# Patient Record
Sex: MALE | Race: WHITE | NOT HISPANIC OR LATINO | Employment: FULL TIME | URBAN - METROPOLITAN AREA
[De-identification: names, ages, dates, MRNs, and addresses within clinical notes are randomized per-mention and may not be internally consistent; named-entity substitution may affect disease eponyms.]

---

## 2017-06-05 DIAGNOSIS — E55.9 VITAMIN D DEFICIENCY: ICD-10-CM

## 2017-06-05 DIAGNOSIS — R53.83 OTHER FATIGUE: ICD-10-CM

## 2017-06-05 DIAGNOSIS — E29.1 TESTICULAR HYPOFUNCTION: ICD-10-CM

## 2017-06-05 DIAGNOSIS — F41.1 GENERALIZED ANXIETY DISORDER: ICD-10-CM

## 2017-06-05 DIAGNOSIS — Z13.220 ENCOUNTER FOR SCREENING FOR LIPOID DISORDERS: ICD-10-CM

## 2017-06-09 ENCOUNTER — TRANSCRIBE ORDERS (OUTPATIENT)
Dept: LAB | Facility: CLINIC | Age: 37
End: 2017-06-09

## 2017-06-09 ENCOUNTER — APPOINTMENT (OUTPATIENT)
Dept: LAB | Facility: CLINIC | Age: 37
End: 2017-06-09
Payer: COMMERCIAL

## 2017-06-09 DIAGNOSIS — E29.1 TESTICULAR HYPOFUNCTION: ICD-10-CM

## 2017-06-09 PROCEDURE — 36415 COLL VENOUS BLD VENIPUNCTURE: CPT

## 2017-06-09 PROCEDURE — 84403 ASSAY OF TOTAL TESTOSTERONE: CPT

## 2017-06-09 PROCEDURE — 84402 ASSAY OF FREE TESTOSTERONE: CPT

## 2017-06-10 LAB
TESTOST FREE SERPL-MCNC: 6.2 PG/ML (ref 8.7–25.1)
TESTOST SERPL-MCNC: 340 NG/DL (ref 348–1197)
TESTOSTERONE COMMENT: ABNORMAL

## 2017-06-12 ENCOUNTER — ALLSCRIPTS OFFICE VISIT (OUTPATIENT)
Dept: OTHER | Facility: OTHER | Age: 37
End: 2017-06-12

## 2017-06-12 ENCOUNTER — TRANSCRIBE ORDERS (OUTPATIENT)
Dept: LAB | Facility: CLINIC | Age: 37
End: 2017-06-12

## 2017-06-12 ENCOUNTER — APPOINTMENT (OUTPATIENT)
Dept: LAB | Facility: CLINIC | Age: 37
End: 2017-06-12
Payer: COMMERCIAL

## 2017-06-12 DIAGNOSIS — E29.1 TESTICULAR HYPOFUNCTION: ICD-10-CM

## 2017-06-12 DIAGNOSIS — E55.9 VITAMIN D DEFICIENCY: ICD-10-CM

## 2017-06-12 DIAGNOSIS — Z13.220 ENCOUNTER FOR SCREENING FOR LIPOID DISORDERS: ICD-10-CM

## 2017-06-12 DIAGNOSIS — R53.83 OTHER FATIGUE: ICD-10-CM

## 2017-06-12 DIAGNOSIS — F41.1 GENERALIZED ANXIETY DISORDER: ICD-10-CM

## 2017-06-12 LAB
25(OH)D3 SERPL-MCNC: 19.1 NG/ML (ref 30–100)
ALBUMIN SERPL BCP-MCNC: 3.9 G/DL (ref 3.5–5)
ALP SERPL-CCNC: 60 U/L (ref 46–116)
ALT SERPL W P-5'-P-CCNC: 35 U/L (ref 12–78)
ANION GAP SERPL CALCULATED.3IONS-SCNC: 6 MMOL/L (ref 4–13)
AST SERPL W P-5'-P-CCNC: 34 U/L (ref 5–45)
BILIRUB SERPL-MCNC: 0.72 MG/DL (ref 0.2–1)
BUN SERPL-MCNC: 17 MG/DL (ref 5–25)
CALCIUM SERPL-MCNC: 9.3 MG/DL (ref 8.3–10.1)
CHLORIDE SERPL-SCNC: 103 MMOL/L (ref 100–108)
CHOLEST SERPL-MCNC: 233 MG/DL (ref 50–200)
CO2 SERPL-SCNC: 30 MMOL/L (ref 21–32)
CREAT SERPL-MCNC: 0.92 MG/DL (ref 0.6–1.3)
ERYTHROCYTE [DISTWIDTH] IN BLOOD BY AUTOMATED COUNT: 14 % (ref 11.6–15.1)
GFR SERPL CREATININE-BSD FRML MDRD: >60 ML/MIN/1.73SQ M
GLUCOSE SERPL-MCNC: 98 MG/DL (ref 65–140)
HCT VFR BLD AUTO: 46.3 % (ref 36.5–49.3)
HDLC SERPL-MCNC: 63 MG/DL (ref 40–60)
HGB BLD-MCNC: 15.5 G/DL (ref 12–17)
IRON SERPL-MCNC: 92 UG/DL (ref 65–175)
LDLC SERPL CALC-MCNC: 147 MG/DL (ref 0–100)
MCH RBC QN AUTO: 29.8 PG (ref 26.8–34.3)
MCHC RBC AUTO-ENTMCNC: 33.5 G/DL (ref 31.4–37.4)
MCV RBC AUTO: 89 FL (ref 82–98)
PLATELET # BLD AUTO: 213 THOUSANDS/UL (ref 149–390)
PMV BLD AUTO: 11.5 FL (ref 8.9–12.7)
POTASSIUM SERPL-SCNC: 4.6 MMOL/L (ref 3.5–5.3)
PROT SERPL-MCNC: 7.2 G/DL (ref 6.4–8.2)
RBC # BLD AUTO: 5.2 MILLION/UL (ref 3.88–5.62)
SODIUM SERPL-SCNC: 139 MMOL/L (ref 136–145)
TRIGL SERPL-MCNC: 115 MG/DL
TSH SERPL DL<=0.05 MIU/L-ACNC: 3.1 UIU/ML (ref 0.36–3.74)
VIT B12 SERPL-MCNC: 575 PG/ML (ref 100–900)
WBC # BLD AUTO: 9.02 THOUSAND/UL (ref 4.31–10.16)

## 2017-06-12 PROCEDURE — 36415 COLL VENOUS BLD VENIPUNCTURE: CPT

## 2017-06-12 PROCEDURE — 83540 ASSAY OF IRON: CPT

## 2017-06-12 PROCEDURE — 85027 COMPLETE CBC AUTOMATED: CPT

## 2017-06-12 PROCEDURE — 80053 COMPREHEN METABOLIC PANEL: CPT

## 2017-06-12 PROCEDURE — 84402 ASSAY OF FREE TESTOSTERONE: CPT

## 2017-06-12 PROCEDURE — 84403 ASSAY OF TOTAL TESTOSTERONE: CPT

## 2017-06-12 PROCEDURE — 82607 VITAMIN B-12: CPT

## 2017-06-12 PROCEDURE — 82306 VITAMIN D 25 HYDROXY: CPT

## 2017-06-12 PROCEDURE — 80061 LIPID PANEL: CPT

## 2017-06-12 PROCEDURE — 84443 ASSAY THYROID STIM HORMONE: CPT

## 2017-06-13 LAB
TESTOST FREE SERPL-MCNC: 6.3 PG/ML (ref 8.7–25.1)
TESTOST SERPL-MCNC: 426 NG/DL (ref 348–1197)
TESTOSTERONE COMMENT: ABNORMAL

## 2017-06-14 ENCOUNTER — GENERIC CONVERSION - ENCOUNTER (OUTPATIENT)
Dept: OTHER | Facility: OTHER | Age: 37
End: 2017-06-14

## 2017-06-19 ENCOUNTER — TRANSCRIBE ORDERS (OUTPATIENT)
Dept: ADMINISTRATIVE | Facility: HOSPITAL | Age: 37
End: 2017-06-19

## 2017-06-19 DIAGNOSIS — R06.83 SNORING: Primary | ICD-10-CM

## 2017-06-19 DIAGNOSIS — R40.0 DAYTIME SLEEPINESS: ICD-10-CM

## 2017-07-05 ENCOUNTER — APPOINTMENT (OUTPATIENT)
Dept: LAB | Facility: MEDICAL CENTER | Age: 37
End: 2017-07-05
Payer: COMMERCIAL

## 2017-07-05 DIAGNOSIS — E29.1 TESTICULAR HYPOFUNCTION: ICD-10-CM

## 2017-07-05 PROCEDURE — 84403 ASSAY OF TOTAL TESTOSTERONE: CPT

## 2017-07-05 PROCEDURE — 36415 COLL VENOUS BLD VENIPUNCTURE: CPT

## 2017-07-05 PROCEDURE — 84402 ASSAY OF FREE TESTOSTERONE: CPT

## 2017-07-06 LAB
TESTOST FREE SERPL-MCNC: 32.3 PG/ML (ref 8.7–25.1)
TESTOST SERPL-MCNC: 1474 NG/DL (ref 348–1197)
TESTOSTERONE COMMENT: ABNORMAL

## 2017-07-07 ENCOUNTER — GENERIC CONVERSION - ENCOUNTER (OUTPATIENT)
Dept: OTHER | Facility: OTHER | Age: 37
End: 2017-07-07

## 2017-08-09 ENCOUNTER — TRANSCRIBE ORDERS (OUTPATIENT)
Dept: ADMINISTRATIVE | Facility: HOSPITAL | Age: 37
End: 2017-08-09

## 2017-08-09 DIAGNOSIS — G47.33 OBSTRUCTIVE SLEEP APNEA (ADULT) (PEDIATRIC): Primary | ICD-10-CM

## 2018-01-13 NOTE — RESULT NOTES
Verified Results  (1) CBC/ PLT (NO DIFF) [id number] 09:26AM Delisa Nguyen Order Number: [id number]     Test Name Result Flag Reference   HEMATOCRIT 46 3 %  36 5-49 3   HEMOGLOBIN 15 5 g/dL  12 0-17 0   MCHC 33 5 g/dL  31 4-37 4   MCH 29 8 pg  26 8-34 3   MCV 89 fL  82-98   PLATELET COUNT 075 Thousands/uL  149-390   RBC COUNT 5 20 Million/uL  3 88-5 62   RDW 14 0 %  11 6-15 1   WBC COUNT 9 02 Thousand/uL  4 31-10 16   MPV 11 5 fL  8 9-12 7     (1) COMPREHENSIVE METABOLIC PANEL [id number] 90:12ZY Delisa Nguyen Order Number: [id number]     Test Name Result Flag Reference   GLUCOSE,RANDM 98 mg/dL     If the patient is fasting, the ADA then defines impaired fasting glucose as > 100 mg/dL and diabetes as > or equal to 123 mg/dL  SODIUM 139 mmol/L  136-145   POTASSIUM 4 6 mmol/L  3 5-5 3   CHLORIDE 103 mmol/L  100-108   CARBON DIOXIDE 30 mmol/L  21-32   ANION GAP (CALC) 6 mmol/L  4-13   BLOOD UREA NITROGEN 17 mg/dL  5-25   CREATININE 0 92 mg/dL  0 60-1 30   Standardized to IDMS reference method   CALCIUM 9 3 mg/dL  8 3-10 1   BILI, TOTAL 0 72 mg/dL  0 20-1 00   ALK PHOSPHATAS 60 U/L     ALT (SGPT) 35 U/L  12-78   AST(SGOT) 34 U/L  5-45   ALBUMIN 3 9 g/dL  3 5-5 0   TOTAL PROTEIN 7 2 g/dL  6 4-8 2   eGFR Non-African American      >60 0 ml/min/1 73sq m   [facility] Disease Education Program recommendations are as follows:  GFR calculation is accurate only with a steady state creatinine  Chronic Kidney disease less than 60 ml/min/1 73 sq  meters  Kidney failure less than 15 ml/min/1 73 sq  meters  (1) LIPID PANEL, FASTING 12Jun2017 09:26AM Delisa Nguyen Order Number: [id number]     Test Name Result Flag Reference   CHOLESTEROL 233 mg/dL H    HDL,DIRECT 63 mg/dL H 40-60   Specimen collection should occur prior to Metamizole administration due to the potential for falsely depressed results     LDL CHOLESTEROL CALCULATED 147 mg/dL H 0-100 Triglyceride:         Normal              <150 mg/dl       Borderline High    150-199 mg/dl       High               200-499 mg/dl       Very High          >499 mg/dl  Cholesterol:         Desirable        <200 mg/dl      Borderline High  200-239 mg/dl      High             >239 mg/dl  HDL Cholesterol:        High    >59 mg/dL      Low     <41 mg/dL  LDL CALCULATED:    This screening LDL is a calculated result  It does not have the accuracy of the Direct Measured LDL in the monitoring of patients with hyperlipidemia and/or statin therapy  Direct Measure LDL (KIR477) must be ordered separately in these patients  TRIGLYCERIDES 115 mg/dL  <=150   Specimen collection should occur prior to N-Acetylcysteine or Metamizole administration due to the potential for falsely depressed results  (1) TSH [id number] 09:26AM Abiodun Sorenson Order Number: [id number]     Test Name Result Flag Reference   TSH 3 100 uIU/mL  0 358-3 740   Patients undergoing fluorescein dye angiography may retain small amounts of fluorescein in the body for 48-72 hours post procedure  Samples containing fluorescein can produce falsely depressed TSH values  If the patient had this procedure,a specimen should be resubmitted post fluorescein clearance  (1) VITAMIN D 25-HYDROXY 12Jun2017 09:26AM Abiodun Sorenson Order Number: [id number]     Test Name Result Flag Reference   VIT D 25-HYDROX 19 1 ng/mL L 30 0-100 0   This assay is a certified procedure of the CDC Vitamin D Standardization Certification Program (VDSCP)     Deficiency <20ng/ml   Insufficiency 20-30ng/ml   Sufficient  ng/ml     *Patients undergoing fluorescein dye angiography may retain small amounts of fluorescein in the body for 48-72 hours post procedure  Samples containing fluorescein can produce falsely elevated Vitamin D values  If the patient had this procedure, a specimen should be resubmitted post fluorescein clearance       (1) TESTOSTERONE, FREE (DIRECT) AND TOTAL 12Jun2017 09:26AM Sophia Cali Moura Order Number: [id number]     Test Name Result Flag Reference   FREE TESTOSTERONE, DIRECT 6 3 pg/mL L 8 7 - 25 1   COMMENT Comment     Adult male reference interval is based on a population of lean males  up to 36years old  TESTOSTERONE (TOTAL) 426 ng/dL  348 - 1197   **Effective July 17, 2017 the reference interval for**    Testosterone, Serum Males >25years old will be    changing to: Adult Males:      264 - 916    Performed at:  [address] [place] [address]  [id number]  : Braydon Saleem MD, Phone:  [phone]     (1) VITAMIN B12 [id number] 09:26AM Jory Richards Order Number: [id number]     Test Name Result Flag Reference   VITAMIN B12 575 pg/mL  100-900     (1) IRON [id number] 09:26AM Jory Richards Order Number: [id number]     Test Name Result Flag Reference   IRON 92 ug/dL     Patients treated with metal-binding drugs (ie  Deferoxamine) may have depressed iron values

## 2018-01-14 VITALS
HEIGHT: 60 IN | DIASTOLIC BLOOD PRESSURE: 70 MMHG | WEIGHT: 212.5 LBS | HEART RATE: 63 BPM | TEMPERATURE: 97.1 F | BODY MASS INDEX: 41.72 KG/M2 | RESPIRATION RATE: 18 BRPM | OXYGEN SATURATION: 98 % | SYSTOLIC BLOOD PRESSURE: 110 MMHG

## 2018-01-17 NOTE — RESULT NOTES
Verified Results  (1) TESTOSTERONE, FREE (DIRECT) AND TOTAL [id number] 03:41PM Rex Varela Order Number: [id number]     Test Name Result Flag Reference   FREE TESTOSTERONE, DIRECT 32 3 pg/mL H 8 7 - 25 1   COMMENT Comment     Adult male reference interval is based on a population of lean males  up to 36years old     TESTOSTERONE (TOTAL) 1474 ng/dL H 348 - 1197   **Please note reference interval change**    Performed at:  [facility] [employer] [address]  [id number]  : Amilcar Sheldon MD, Phone:  [phone]

## 2018-01-17 NOTE — RESULT NOTES
Verified Results  (1) CBC/PLT/DIFF [id number] 10:06AM Geraldine Bey    Order Number: [id number]  TW Order Number: [id number]     Test Name Result Flag Reference   WBC COUNT 7 12 Thousand/uL  4 31-10 16   RBC COUNT 5 22 Million/uL  3 88-5 62   HEMOGLOBIN 15 5 g/dL  12 0-17 0   HEMATOCRIT 46 5 %  36 5-49 3   MCV 89 fL  82-98   MCH 29 7 pg  26 8-34 3   MCHC 33 3 g/dL  31 4-37 4   RDW 13 2 %  11 6-15 1   MPV 11 5 fL  8 9-12 7   PLATELET COUNT 471 Thousands/uL  149-390   NEUTROPHILS RELATIVE PERCENT 63 %  43-75   LYMPHOCYTES RELATIVE PERCENT 28 %  14-44   MONOCYTES RELATIVE PERCENT 8 %  4-12   EOSINOPHILS RELATIVE PERCENT 1 %  0-6   BASOPHILS RELATIVE PERCENT 0 %  0-1   NEUTROPHILS ABSOLUTE COUNT 4 45 Thousands/?L  1 85-7 62   LYMPHOCYTES ABSOLUTE COUNT 2 02 Thousands/?L  0 60-4 47   MONOCYTES ABSOLUTE COUNT 0 57 Thousand/?L  0 17-1 22   EOSINOPHILS ABSOLUTE COUNT 0 07 Thousand/?L  0 00-0 61   BASOPHILS ABSOLUTE COUNT 0 01 Thousands/?L  0 00-0 10   - Patient Instructions: This bloodwork is non-fasting  Please drink two glasses of water morning of bloodwork  - Patient Instructions: This bloodwork is non-fasting  Please drink two glasses of water morning of bloodwork  - Patient Instructions: This bloodwork is non-fasting  Please drink two glasses of water morning of bloodwork  - Patient Instructions: This bloodwork is non-fasting  Please drink two glasses of water morning of bloodwork  (1) COMPREHENSIVE METABOLIC PANEL [id number] 05:85KM Delisa Fontana Order Number: [id number]   Order Number: [id number]     Test Name Result Flag Reference   GLUCOSE,RANDM 98 mg/dL     If the patient is fasting, the ADA then defines impaired fasting glucose as > 100 mg/dL and diabetes as > or equal to 123 mg/dL     SODIUM 137 mmol/L  136-145   POTASSIUM 4 5 mmol/L  3 5-5 3   CHLORIDE 101 mmol/L  100-108   CARBON DIOXIDE 26 mmol/L  21-32   ANION GAP (CALC) 10 mmol/L  4-13 BLOOD UREA NITROGEN 13 mg/dL  5-25   CREATININE 1 10 mg/dL  0 60-1 30   Standardized to IDMS reference method   CALCIUM 8 9 mg/dL  8 3-10 1   BILI, TOTAL 1 60 mg/dL H 0 20-1 00   ALK PHOSPHATAS 52 U/L     ALT (SGPT) 33 U/L  12-78   AST(SGOT) 35 U/L  5-45   ALBUMIN 4 1 g/dL  3 5-5 0   TOTAL PROTEIN 7 2 g/dL  6 4-8 2   eGFR Non-African American      >60 0 ml/min/1 73sq m   - Patient Instructions: This is a fasting blood test  Water,black tea or black  coffee only after 9:00pm the night before test Drink 2 glasses of water the morning of test - Patient Instructions: This is a fasting blood test  Water,black tea or black    coffee only after 9:00pm the night before test Drink 2 glasses of water the morning of test - Patient Instructions: This bloodwork is non-fasting  Please drink two glasses of water morning of bloodwork  - Patient Instructions: This bloodwork is   non-fasting  Please drink two glasses of water morning of bloodwork  National Kidney Disease Education Program recommendations are as follows:  GFR calculation is accurate only with a steady state creatinine  Chronic Kidney disease less than 60 ml/min/1 73 sq  meters  Kidney failure less than 15 ml/min/1 73 sq  meters  (1) LIPID PANEL, FASTING 01Aug2016 10:06AM Raad Art Order Number: [id number]   Order Number: [id number]     Test Name Result Flag Reference   CHOLESTEROL 235 mg/dL H    HDL,DIRECT 74 mg/dL H 40-60   Specimen collection should occur prior to Metamizole administration due to the potential for falsely depressed results  LDL CHOLESTEROL CALCULATED 150 mg/dL H 0-100   - Patient Instructions: This is a fasting blood test  Water,black tea or black  coffee only after 9:00pm the night before test   Drink 2 glasses of water the morning of test    - Patient Instructions:  This is a fasting blood test  Water,black tea or black  coffee only after 9:00pm the night before test   Drink 2 glasses of water the morning of test     - Patient Instructions: This is a fasting blood test  Water,black tea or black  coffee only after 9:00pm the night before test Drink 2 glasses of water the morning of test - Patient Instructions: This is a fasting blood test  Water,black tea or black    coffee only after 9:00pm the night before test Drink 2 glasses of water the morning of test - Patient Instructions: This bloodwork is non-fasting  Please drink two glasses of water morning of bloodwork  - Patient Instructions: This bloodwork is   non-fasting  Please drink two glasses of water morning of bloodwork  Triglyceride:         Normal              <150 mg/dl       Borderline High    150-199 mg/dl       High               200-499 mg/dl       Very High          >499 mg/dl  Cholesterol:         Desirable        <200 mg/dl      Borderline High  200-239 mg/dl      High             >239 mg/dl  HDL Cholesterol:        High    >59 mg/dL      Low     <41 mg/dL  LDL CALCULATED:    This screening LDL is a calculated result  It does not have the accuracy of the Direct Measured LDL in the monitoring of patients with hyperlipidemia and/or statin therapy  Direct Measure LDL (PFR791) must be ordered separately in these patients  TRIGLYCERIDES 54 mg/dL  <=150   Specimen collection should occur prior to N-Acetylcysteine or Metamizole administration due to the potential for falsely depressed results  (1) TSH [id number] 10:06AM Alysha Medina Order Number: [id number]   Order Number: [id number]     Test Name Result Flag Reference   TSH 2 415 uIU/mL  0 358-3 740   - Patient Instructions: This bloodwork is non-fasting  Please drink two glasses of water morning of bloodwork  - Patient Instructions: This bloodwork is non-fasting  Please drink two glasses of water morning of bloodwork  - Patient Instructions:  This is a fasting blood test  Water,black tea or black  coffee only after 9:00pm the night before test Drink 2 glasses of water the morning of test - Patient Instructions: This is a fasting blood test  Water,black tea or black    coffee only after 9:00pm the night before test Drink 2 glasses of water the morning of test - Patient Instructions: This bloodwork is non-fasting  Please drink two glasses of water morning of bloodwork  - Patient Instructions: This bloodwork is   non-fasting  Please drink two glasses of water morning of bloodwork  Patients undergoing fluorescein dye angiography may retain small amounts of fluorescein in the body for 48-72 hours post procedure  Samples containing fluorescein can produce falsely depressed TSH values  If the patient had this procedure,a specimen should be resubmitted post fluorescein clearance  (1) VITAMIN D 25-HYDROXY [id number] 10:06AM Elaine Hayward Order Number: [id number]   Order Number: [id number]     Test Name Result Flag Reference   VIT D 25-HYDROX 25 7 ng/mL L 30 0-100 0   This assay is a certified procedure of the CDC Vitamin D Standardization Certification Program (VDSCP)     Deficiency <20ng/ml   Insufficiency 20-30ng/ml   Sufficient  ng/ml     *Patients undergoing fluorescein dye angiography may retain small amounts of fluorescein in the body for 48-72 hours post procedure  Samples containing fluorescein can produce falsely elevated Vitamin D values  If the patient had this procedure, a specimen should be resubmitted post fluorescein clearance

## 2018-01-18 NOTE — RESULT NOTES
Verified Results  (1) TESTOSTERONE, FREE (DIRECT) AND TOTAL [id number] 10:06AM Jackye Reason Order Number: [id number]     Test Name Result Flag Reference   FREE TESTOSTERONE, DIRECT 3 7 pg/mL L 8 7 - 25 1   COMMENT Comment     Adult male reference interval is based on a population of lean males  up to 36years old     TESTOSTERONE (TOTAL) 299 ng/dL L 348 - 1197   Performed at:  [address] [place] [address]  [id number]  : Nelida Mccullough MD, Phone:  [phone]

## 2018-02-12 ENCOUNTER — TELEPHONE (OUTPATIENT)
Dept: FAMILY MEDICINE CLINIC | Facility: CLINIC | Age: 38
End: 2018-02-12

## 2018-02-12 NOTE — TELEPHONE ENCOUNTER
Pt called and stated that he was recently dx with gum disease  His ? Is  are any of the medications that he is taking will cause or increase the chance of gum disease    He mentioned the testosterone    Please advise  [phone]

## 2018-02-12 NOTE — TELEPHONE ENCOUNTER
Nope, all his meds are ok and do not contribute to this   Usually the meds responsible for this are seizure meds, immunosuppresants and certain BP meds

## 2018-02-20 ENCOUNTER — TELEPHONE (OUTPATIENT)
Dept: FAMILY MEDICINE CLINIC | Facility: CLINIC | Age: 38
End: 2018-02-20

## 2018-02-20 DIAGNOSIS — E29.1 HYPOGONADISM IN MALE: Primary | ICD-10-CM

## 2018-02-20 NOTE — TELEPHONE ENCOUNTER
Oj  No longer will be doing the testosterone shot, he stated that he had a bad experience      He was do to take the shot yesterday  He would like to know if he can go back on the lotion    Please advise    Lorena aragon  [phone]

## 2018-02-21 ENCOUNTER — TRANSCRIBE ORDERS (OUTPATIENT)
Dept: LAB | Facility: CLINIC | Age: 38
End: 2018-02-21

## 2018-02-21 ENCOUNTER — APPOINTMENT (OUTPATIENT)
Dept: LAB | Facility: CLINIC | Age: 38
End: 2018-02-21
Payer: COMMERCIAL

## 2018-02-21 DIAGNOSIS — E29.1 HYPOGONADISM IN MALE: ICD-10-CM

## 2018-02-21 PROCEDURE — 84402 ASSAY OF FREE TESTOSTERONE: CPT

## 2018-02-21 PROCEDURE — 36415 COLL VENOUS BLD VENIPUNCTURE: CPT

## 2018-02-21 PROCEDURE — 84403 ASSAY OF TOTAL TESTOSTERONE: CPT

## 2018-02-22 LAB
TESTOST FREE SERPL-MCNC: 6 PG/ML (ref 8.7–25.1)
TESTOST SERPL-MCNC: 370 NG/DL (ref 264–916)

## 2018-02-23 DIAGNOSIS — E29.1 TESTICULAR HYPOGONADISM: Primary | ICD-10-CM

## 2018-02-23 RX ORDER — TESTOSTERONE 40.5 MG/2.5G
40.5 GEL TOPICAL DAILY
Qty: 2.5 G | Refills: 0 | Status: SHIPPED | OUTPATIENT
Start: 2018-02-23 | End: 2018-05-02 | Stop reason: SDUPTHER

## 2018-02-27 RX ORDER — ERGOCALCIFEROL 1.25 MG/1
1 CAPSULE ORAL WEEKLY
COMMUNITY
Start: 2017-06-15 | End: 2019-07-16 | Stop reason: SDUPTHER

## 2018-02-27 RX ORDER — ERGOCALCIFEROL 1.25 MG/1
50000 CAPSULE ORAL WEEKLY
Qty: 4 CAPSULE | Refills: 5 | OUTPATIENT
Start: 2018-02-27

## 2018-05-02 DIAGNOSIS — E29.1 TESTICULAR HYPOGONADISM: ICD-10-CM

## 2018-05-02 RX ORDER — TESTOSTERONE 40.5 MG/2.5G
40.5 GEL TOPICAL DAILY
Qty: 2.5 G | Refills: 1 | Status: SHIPPED | OUTPATIENT
Start: 2018-05-02 | End: 2018-07-24 | Stop reason: SDUPTHER

## 2018-05-08 DIAGNOSIS — F41.1 GAD (GENERALIZED ANXIETY DISORDER): Primary | ICD-10-CM

## 2018-05-08 RX ORDER — CITALOPRAM 40 MG/1
40 TABLET ORAL DAILY
Qty: 30 TABLET | Refills: 0 | Status: SHIPPED | OUTPATIENT
Start: 2018-05-08 | End: 2018-07-24 | Stop reason: SDUPTHER

## 2018-05-08 RX ORDER — CITALOPRAM 40 MG/1
1 TABLET ORAL DAILY
COMMUNITY
Start: 2016-07-25 | End: 2018-05-08 | Stop reason: SDUPTHER

## 2018-05-08 RX ORDER — BUSPIRONE HYDROCHLORIDE 5 MG/1
5 TABLET ORAL 3 TIMES DAILY
Qty: 270 TABLET | Refills: 0 | Status: SHIPPED | OUTPATIENT
Start: 2018-05-08 | End: 2018-07-24 | Stop reason: SDUPTHER

## 2018-05-08 RX ORDER — BUSPIRONE HYDROCHLORIDE 5 MG/1
1 TABLET ORAL 3 TIMES DAILY
COMMUNITY
Start: 2016-07-25 | End: 2018-05-08 | Stop reason: SDUPTHER

## 2018-07-23 ENCOUNTER — TELEPHONE (OUTPATIENT)
Dept: FAMILY MEDICINE CLINIC | Facility: CLINIC | Age: 38
End: 2018-07-23

## 2018-07-23 NOTE — TELEPHONE ENCOUNTER
Change pharmacy to [facility]        Asking for Androgel, Buspirone, citalopram  Not seen since 6/17  Called and L/M for pt to schedule appt

## 2018-07-24 DIAGNOSIS — E29.1 TESTICULAR HYPOGONADISM: ICD-10-CM

## 2018-07-24 DIAGNOSIS — F41.1 GAD (GENERALIZED ANXIETY DISORDER): ICD-10-CM

## 2018-07-24 RX ORDER — BUSPIRONE HYDROCHLORIDE 5 MG/1
5 TABLET ORAL 3 TIMES DAILY
Qty: 90 TABLET | Refills: 0 | Status: SHIPPED | OUTPATIENT
Start: 2018-07-24 | End: 2018-10-01 | Stop reason: SDUPTHER

## 2018-07-24 RX ORDER — TESTOSTERONE 40.5 MG/2.5G
40.5 GEL TOPICAL DAILY
Qty: 2.5 G | Refills: 0 | Status: SHIPPED | OUTPATIENT
Start: 2018-07-24 | End: 2018-09-06 | Stop reason: SDUPTHER

## 2018-07-24 RX ORDER — CITALOPRAM 40 MG/1
40 TABLET ORAL DAILY
Qty: 30 TABLET | Refills: 0 | Status: SHIPPED | OUTPATIENT
Start: 2018-07-24 | End: 2018-10-01 | Stop reason: SDUPTHER

## 2018-08-08 ENCOUNTER — OFFICE VISIT (OUTPATIENT)
Dept: FAMILY MEDICINE CLINIC | Facility: CLINIC | Age: 38
End: 2018-08-08
Payer: COMMERCIAL

## 2018-08-08 VITALS
DIASTOLIC BLOOD PRESSURE: 64 MMHG | BODY MASS INDEX: 30.34 KG/M2 | HEIGHT: 68 IN | SYSTOLIC BLOOD PRESSURE: 108 MMHG | WEIGHT: 200.2 LBS

## 2018-08-08 DIAGNOSIS — E29.1 TESTICULAR HYPOGONADISM: ICD-10-CM

## 2018-08-08 DIAGNOSIS — F41.1 GENERALIZED ANXIETY DISORDER: ICD-10-CM

## 2018-08-08 DIAGNOSIS — E55.9 VITAMIN D DEFICIENCY: Primary | ICD-10-CM

## 2018-08-08 DIAGNOSIS — R94.5 ABNORMAL RESULTS OF LIVER FUNCTION STUDIES: ICD-10-CM

## 2018-08-08 DIAGNOSIS — E66.09 CLASS 1 OBESITY DUE TO EXCESS CALORIES WITHOUT SERIOUS COMORBIDITY WITH BODY MASS INDEX (BMI) OF 30.0 TO 30.9 IN ADULT: ICD-10-CM

## 2018-08-08 DIAGNOSIS — F98.5 ADULT STUTTERING: ICD-10-CM

## 2018-08-08 PROBLEM — R53.83 FATIGUE: Status: ACTIVE | Noted: 2017-06-12

## 2018-08-08 PROBLEM — G47.19 EXCESSIVE DAYTIME SLEEPINESS: Status: ACTIVE | Noted: 2017-06-12

## 2018-08-08 PROCEDURE — 99214 OFFICE O/P EST MOD 30 MIN: CPT | Performed by: NURSE PRACTITIONER

## 2018-08-08 PROCEDURE — 3008F BODY MASS INDEX DOCD: CPT | Performed by: NURSE PRACTITIONER

## 2018-08-08 NOTE — PROGRESS NOTES
Assessment/Plan:    Establish Care/ New Patient  Overall doing well  Will update lab work to look for causes of fatigue  Denies any anxiety or depression  If hormone therapy treatment needs to be changed will refer patient to endocrinology  Patient verbalizes understand and agrees with treatment plan  Will update patient when lab results available  Follow up in 6 months  Call us if you experience any worsening symptoms or no improvement  Adult stuttering  Stable on Celexa and buspar  Abnormal results of liver function studies  Will update CMP    Testicular hypogonadism  Will update testosterone level    Generalized anxiety disorder  Patient denies this diagnosis is accurate  Will remove  Vitamin D deficiency  Will update vitamin D       Diagnoses and all orders for this visit:    Vitamin D deficiency  -     Vitamin D 25 hydroxy; Future    Abnormal results of liver function studies  -     Comprehensive metabolic panel; Future    Testicular hypogonadism  -     Testosterone, free, total; Future    Generalized anxiety disorder  -     CBC and differential; Future  -     TSH, 3rd generation; Future    Class 1 obesity due to excess calories without serious comorbidity with body mass index (BMI) of 30 0 to 30 9 in adult  -     CBC and differential; Future  -     Comprehensive metabolic panel; Future  -     TSH, 3rd generation; Future    Adult stuttering              Subjective:        Patient ID: Luke Stoddard is a 42 y o  male  Chief Complaint   Patient presents with    Establish Care    Testosterone Level     level is 370, taking Andorgel for over 1 year    Vitamin D Deficiency     would like to start taking vitamin D 50,0000 units again       Here to establish as new patient  This is now closer to his home  Uses androgel for low testosterone, doesn't feel he has much energy to do much  Always feels tired and feels it's Testerone related  Denies anxiety or depression     On celexa and buspar for stuttering  Sleeps 8 hours a night  Exercises and eats healthy  Works as a  for [employer]  Has 2 daughters, one is 11 one is 15  Up to date with dentist and almost due for eye doctor  Wears glasses  Overall doing well  Does not smoke  Social alcohol use  Currently going through divorce and will be moving to [address] [facility] next week  The following portions of the patient's history were reviewed and updated as appropriate: allergies, current medications, past family history, past social history and problem list     Review of Systems   Constitutional: Positive for fatigue  Negative for chills and fever  HENT: Negative for congestion  Eyes: Negative for pain and visual disturbance  Respiratory: Negative for cough and shortness of breath  Cardiovascular: Negative for chest pain, palpitations and leg swelling  Gastrointestinal: Negative for abdominal pain, diarrhea, nausea and vomiting  Genitourinary: Negative for difficulty urinating and dysuria  Musculoskeletal: Negative for arthralgias and myalgias  Skin: Negative for color change and rash  Neurological: Negative for dizziness, syncope, numbness and headaches  Hematological: Negative for adenopathy  Psychiatric/Behavioral: Negative for agitation and behavioral problems  The patient is not nervous/anxious  Objective:  /64 (BP Location: Left arm, Patient Position: Sitting, Cuff Size: Standard)   Ht 5' 7 5" (1 715 m)   Wt 90 8 kg (200 lb 3 2 oz)   BMI 30 89 kg/m²      Physical Exam   Constitutional: He is oriented to person, place, and time  He appears well-developed  No distress  HENT:   Head: Normocephalic and atraumatic  Right Ear: External ear normal    Left Ear: External ear normal    Nose: Nose normal    Eyes: Conjunctivae and lids are normal  Right eye exhibits no discharge  Left eye exhibits no discharge  Neck: Normal range of motion  Neck supple  No tracheal deviation present  Cardiovascular: Normal rate and regular rhythm  No murmur heard  Pulmonary/Chest: Effort normal and breath sounds normal  No respiratory distress  He has no wheezes  Abdominal: Soft  Bowel sounds are normal  He exhibits no distension  There is no tenderness  There is no guarding  Musculoskeletal: Normal range of motion  He exhibits no edema, tenderness or deformity  Lymphadenopathy:     He has no cervical adenopathy  Neurological: He is alert and oriented to person, place, and time  Coordination normal    Skin: Skin is warm and dry  No rash noted  He is not diaphoretic  No erythema  Psychiatric: He has a normal mood and affect  His speech is normal and behavior is normal  Judgment and thought content normal  Cognition and memory are normal    Nursing note and vitals reviewed

## 2018-08-15 ENCOUNTER — APPOINTMENT (OUTPATIENT)
Dept: LAB | Facility: CLINIC | Age: 38
End: 2018-08-15
Payer: COMMERCIAL

## 2018-08-15 DIAGNOSIS — E29.1 TESTICULAR HYPOGONADISM: ICD-10-CM

## 2018-08-15 DIAGNOSIS — E55.9 VITAMIN D DEFICIENCY: ICD-10-CM

## 2018-08-15 DIAGNOSIS — R94.5 ABNORMAL RESULTS OF LIVER FUNCTION STUDIES: ICD-10-CM

## 2018-08-15 DIAGNOSIS — F41.1 GENERALIZED ANXIETY DISORDER: ICD-10-CM

## 2018-08-15 DIAGNOSIS — E66.09 CLASS 1 OBESITY DUE TO EXCESS CALORIES WITHOUT SERIOUS COMORBIDITY WITH BODY MASS INDEX (BMI) OF 30.0 TO 30.9 IN ADULT: ICD-10-CM

## 2018-08-15 LAB
25(OH)D3 SERPL-MCNC: 36.6 NG/ML (ref 30–100)
ALBUMIN SERPL BCP-MCNC: 4 G/DL (ref 3.5–5)
ALP SERPL-CCNC: 45 U/L (ref 46–116)
ALT SERPL W P-5'-P-CCNC: 39 U/L (ref 12–78)
ANION GAP SERPL CALCULATED.3IONS-SCNC: 7 MMOL/L (ref 4–13)
AST SERPL W P-5'-P-CCNC: 31 U/L (ref 5–45)
BASOPHILS # BLD AUTO: 0.02 THOUSANDS/ΜL (ref 0–0.1)
BASOPHILS NFR BLD AUTO: 0 % (ref 0–1)
BILIRUB SERPL-MCNC: 1.6 MG/DL (ref 0.2–1)
BUN SERPL-MCNC: 24 MG/DL (ref 5–25)
CALCIUM SERPL-MCNC: 9 MG/DL (ref 8.3–10.1)
CHLORIDE SERPL-SCNC: 103 MMOL/L (ref 100–108)
CO2 SERPL-SCNC: 30 MMOL/L (ref 21–32)
CREAT SERPL-MCNC: 1.07 MG/DL (ref 0.6–1.3)
EOSINOPHIL # BLD AUTO: 0.06 THOUSAND/ΜL (ref 0–0.61)
EOSINOPHIL NFR BLD AUTO: 1 % (ref 0–6)
ERYTHROCYTE [DISTWIDTH] IN BLOOD BY AUTOMATED COUNT: 13 % (ref 11.6–15.1)
GFR SERPL CREATININE-BSD FRML MDRD: 87 ML/MIN/1.73SQ M
GLUCOSE P FAST SERPL-MCNC: 104 MG/DL (ref 65–99)
HCT VFR BLD AUTO: 48.6 % (ref 36.5–49.3)
HGB BLD-MCNC: 16.1 G/DL (ref 12–17)
IMM GRANULOCYTES # BLD AUTO: 0.01 THOUSAND/UL (ref 0–0.2)
IMM GRANULOCYTES NFR BLD AUTO: 0 % (ref 0–2)
LYMPHOCYTES # BLD AUTO: 2.06 THOUSANDS/ΜL (ref 0.6–4.47)
LYMPHOCYTES NFR BLD AUTO: 36 % (ref 14–44)
MCH RBC QN AUTO: 29.9 PG (ref 26.8–34.3)
MCHC RBC AUTO-ENTMCNC: 33.1 G/DL (ref 31.4–37.4)
MCV RBC AUTO: 90 FL (ref 82–98)
MONOCYTES # BLD AUTO: 0.47 THOUSAND/ΜL (ref 0.17–1.22)
MONOCYTES NFR BLD AUTO: 8 % (ref 4–12)
NEUTROPHILS # BLD AUTO: 3.11 THOUSANDS/ΜL (ref 1.85–7.62)
NEUTS SEG NFR BLD AUTO: 55 % (ref 43–75)
NRBC BLD AUTO-RTO: 0 /100 WBCS
PLATELET # BLD AUTO: 219 THOUSANDS/UL (ref 149–390)
PMV BLD AUTO: 10.7 FL (ref 8.9–12.7)
POTASSIUM SERPL-SCNC: 4.2 MMOL/L (ref 3.5–5.3)
PROT SERPL-MCNC: 7.1 G/DL (ref 6.4–8.2)
RBC # BLD AUTO: 5.39 MILLION/UL (ref 3.88–5.62)
SODIUM SERPL-SCNC: 140 MMOL/L (ref 136–145)
TSH SERPL DL<=0.05 MIU/L-ACNC: 2.28 UIU/ML (ref 0.36–3.74)
WBC # BLD AUTO: 5.73 THOUSAND/UL (ref 4.31–10.16)

## 2018-08-15 PROCEDURE — 84443 ASSAY THYROID STIM HORMONE: CPT

## 2018-08-15 PROCEDURE — 80053 COMPREHEN METABOLIC PANEL: CPT

## 2018-08-15 PROCEDURE — 84403 ASSAY OF TOTAL TESTOSTERONE: CPT

## 2018-08-15 PROCEDURE — 82306 VITAMIN D 25 HYDROXY: CPT

## 2018-08-15 PROCEDURE — 84402 ASSAY OF FREE TESTOSTERONE: CPT

## 2018-08-15 PROCEDURE — 36415 COLL VENOUS BLD VENIPUNCTURE: CPT

## 2018-08-15 PROCEDURE — 85025 COMPLETE CBC W/AUTO DIFF WBC: CPT

## 2018-08-16 LAB
TESTOST FREE SERPL-MCNC: 8.6 PG/ML (ref 8.7–25.1)
TESTOST SERPL-MCNC: 549 NG/DL (ref 264–916)

## 2018-08-24 ENCOUNTER — TELEPHONE (OUTPATIENT)
Dept: FAMILY MEDICINE CLINIC | Facility: CLINIC | Age: 38
End: 2018-08-24

## 2018-08-24 NOTE — TELEPHONE ENCOUNTER
----- Message from [address] Vasu sent at 8/23/2018  2:24 PM EDT -----  Regarding: FW: Test Results Question  Contact: [phone]  Please set up nurse visit with him for A1C       ----- Message -----  From: Elsa Chanel  Sent: 8/22/2018   3:56 PM  To: RL Brown  Subject: FW: Test Results Question                        Please see below message from patient, thanks    ----- Message -----  From: Aj Rod  Sent: 8/22/2018  12:37 PM  To: [facility]  Subject: RE: Test Results Question                        Hi Miriam,    I was fasting for the blood sample, my last meal was 6pm the evening before  Thank you  Eric Taylor  ----- Message -----  From: RL Brown  Sent: 8/22/2018 11:37 AM EDT  To: Aj Rod  Subject: RE: Test Results Question  Grabiel Ace,     I reviewed your labs  The only thing a little off was your glucose  Were you fasting for the blood test? If you were, I would like to do an A1C which is a blood test that looks at average sugar over 3 months  Your testosterone was fine and within range  The next step would be seeing endocrinology to address the testosterone level and if they have any other recommendation  I will place the referral and have it mailed to you  Let me know if you were fasting or not the day of the test, if you were I will also put that A1C in the mail to be done  Leyda Alfaro      ----- Message -----     From: Aj Rod     Sent: 8/22/2018  7:39 AM EDT       To: RL Brown  Subject: Test Results Question    Hi Miriam,    Have you noticed anything out of range with my recent test results? I am concerned with my lack of energy, it has been this way for years, and the solution was supposed to be testosterone therapy  Can the dose be increased?     Thank you  Eric Taylor

## 2018-08-27 ENCOUNTER — CLINICAL SUPPORT (OUTPATIENT)
Dept: FAMILY MEDICINE CLINIC | Facility: CLINIC | Age: 38
End: 2018-08-27
Payer: COMMERCIAL

## 2018-08-27 ENCOUNTER — TELEPHONE (OUTPATIENT)
Dept: FAMILY MEDICINE CLINIC | Facility: CLINIC | Age: 38
End: 2018-08-27

## 2018-08-27 DIAGNOSIS — R73.09 ABNORMAL GLUCOSE LEVEL: Primary | ICD-10-CM

## 2018-08-27 LAB — SL AMB POCT HEMOGLOBIN AIC: 5.2

## 2018-08-27 PROCEDURE — 83036 HEMOGLOBIN GLYCOSYLATED A1C: CPT | Performed by: NURSE PRACTITIONER

## 2018-08-27 NOTE — TELEPHONE ENCOUNTER
Pt was seen today for an A1C check  It was a 5 2%  Is there anything you would like me to relay to pt?

## 2018-09-06 DIAGNOSIS — E29.1 TESTICULAR HYPOGONADISM: ICD-10-CM

## 2018-09-06 RX ORDER — TESTOSTERONE 40.5 MG/2.5G
40.5 GEL TOPICAL DAILY
Qty: 2.5 G | Refills: 0 | Status: SHIPPED | OUTPATIENT
Start: 2018-09-06 | End: 2018-10-10 | Stop reason: SDUPTHER

## 2018-10-01 DIAGNOSIS — F41.1 GAD (GENERALIZED ANXIETY DISORDER): ICD-10-CM

## 2018-10-01 RX ORDER — CITALOPRAM 40 MG/1
TABLET ORAL
Qty: 30 TABLET | Refills: 0 | Status: SHIPPED | OUTPATIENT
Start: 2018-10-01 | End: 2018-11-14 | Stop reason: SDUPTHER

## 2018-10-01 RX ORDER — BUSPIRONE HYDROCHLORIDE 5 MG/1
TABLET ORAL
Qty: 90 TABLET | Refills: 0 | Status: SHIPPED | OUTPATIENT
Start: 2018-10-01 | End: 2018-11-14 | Stop reason: SDUPTHER

## 2018-10-01 RX ORDER — BUSPIRONE HYDROCHLORIDE 5 MG/1
5 TABLET ORAL 3 TIMES DAILY
Qty: 90 TABLET | Refills: 5 | OUTPATIENT
Start: 2018-10-01

## 2018-10-01 RX ORDER — CITALOPRAM 40 MG/1
40 TABLET ORAL DAILY
Qty: 30 TABLET | Refills: 5 | OUTPATIENT
Start: 2018-10-01

## 2018-10-01 NOTE — TELEPHONE ENCOUNTER
From: Janice Charles  Sent: 10/1/2018 2:00 PM EDT  Subject: Medication Renewal Request    Janice Charles would like a refill of the following medications:     citalopram (CeleXA) 40 mg tablet [Orly Cortes PA-C]   Patient Comment: Will you please send my prescription to : [address], [address] [place] [address] Local Phone: [phone]     busPIRone (BUSPAR) 5 mg tablet Jenny Cortes PA-C]   Patient Comment: Will you please send my prescription to : [address], [address] [place] [address] Local Phone: [phone]    [facility] pharmacy: [facility]  [facility]

## 2018-10-03 DIAGNOSIS — F41.1 GAD (GENERALIZED ANXIETY DISORDER): ICD-10-CM

## 2018-10-03 RX ORDER — BUSPIRONE HYDROCHLORIDE 5 MG/1
5 TABLET ORAL 3 TIMES DAILY
Qty: 90 TABLET | Refills: 0 | Status: CANCELLED | OUTPATIENT
Start: 2018-10-03

## 2018-10-03 RX ORDER — CITALOPRAM 40 MG/1
40 TABLET ORAL DAILY
Qty: 30 TABLET | Refills: 0 | Status: CANCELLED | OUTPATIENT
Start: 2018-10-03

## 2018-10-03 NOTE — TELEPHONE ENCOUNTER
From: Heavenly Hebert  Sent: 10/3/2018 11:40 AM EDT  Subject: Medication Renewal Request    Heavenly Hebert would like a refill of the following medications:     citalopram (CeleXA) 40 mg tablet [Orly Cortes PA-C]     busPIRone (BUSPAR) 5 mg tablet [Orly Cortes PA-C]    Preferred pharmacy: [facility]  : [id number]

## 2018-10-10 DIAGNOSIS — E29.1 TESTICULAR HYPOGONADISM: ICD-10-CM

## 2018-10-11 RX ORDER — TESTOSTERONE 16.2 MG/G
GEL TRANSDERMAL
Qty: 75 G | Refills: 0 | OUTPATIENT
Start: 2018-10-11

## 2018-10-11 RX ORDER — TESTOSTERONE 40.5 MG/2.5G
40.5 GEL TOPICAL DAILY
Qty: 2.5 G | Refills: 0 | Status: SHIPPED | OUTPATIENT
Start: 2018-10-11 | End: 2018-11-14 | Stop reason: SDUPTHER

## 2018-10-11 NOTE — TELEPHONE ENCOUNTER
From: Johnieagatha Pelaez  Sent: 10/10/2018 5:51 PM EDT  Subject: Medication Renewal Request    Erich Pelaez would like a refill of the following medications:     Testosterone (ANDROGEL) 40 5 MG/2 5GM (1 62%) GEL José Luis Toney, RL]   Patient Comment: [facility] [address] [place] [address] Local Phone: [phone]    Preferred pharmacy: [facility]  : [facility]

## 2018-10-26 ENCOUNTER — OFFICE VISIT (OUTPATIENT)
Dept: ENDOCRINOLOGY | Facility: CLINIC | Age: 38
End: 2018-10-26
Payer: COMMERCIAL

## 2018-10-26 VITALS
SYSTOLIC BLOOD PRESSURE: 120 MMHG | WEIGHT: 197.4 LBS | HEIGHT: 68 IN | DIASTOLIC BLOOD PRESSURE: 70 MMHG | HEART RATE: 60 BPM | BODY MASS INDEX: 29.92 KG/M2

## 2018-10-26 DIAGNOSIS — E55.9 VITAMIN D DEFICIENCY: ICD-10-CM

## 2018-10-26 DIAGNOSIS — G47.33 OBSTRUCTIVE SLEEP APNEA SYNDROME: ICD-10-CM

## 2018-10-26 DIAGNOSIS — E29.1 TESTICULAR HYPOGONADISM: Primary | ICD-10-CM

## 2018-10-26 DIAGNOSIS — E34.9 TESTOSTERONE DEFICIENCY: ICD-10-CM

## 2018-10-26 PROCEDURE — 99244 OFF/OP CNSLTJ NEW/EST MOD 40: CPT | Performed by: INTERNAL MEDICINE

## 2018-10-26 NOTE — ASSESSMENT & PLAN NOTE
Almost all of the workup for the fatigue has been normal   I suspect that he may have sleep apnea playing a role in his fatigue, excessive daytime sleepiness and difficulty concentrating  I have referred him to Dr Junior Kam for evaluation and treatment

## 2018-10-26 NOTE — LETTER
October 26, 2018     Wilman Mejia, [address] [facility] , [address]  [address] [place] [address]    Patient: Kendra Gibson   YOB: 1980   Date of Visit: 10/26/2018       Dear Dr Micaela Cook: Thank you for referring Kendra Gibson to me for evaluation  Below are my notes for this consultation  If you have questions, please do not hesitate to call me  I look forward to following your patient along with you  Sincerely,        Julia Middleton MD        CC: No Recipients  Julia Middleton MD  10/26/2018  9:35 AM  Sign at close encounter  Assessment/Plan:    Testicular hypogonadism  His testosterone level is in the target range  Continue current testosterone replacement therapy  Fatigue  Almost all of the workup for the fatigue has been normal   I suspect that he may have sleep apnea playing a role in his fatigue, excessive daytime sleepiness and difficulty concentrating  I have referred him to Dr Eliot Barlow for evaluation and treatment  Since his testosterone level is adequate, I will plan to see him as needed  He will follow with his primary care physician for his healthcare needs  Diagnoses and all orders for this visit:    Testicular hypogonadism    Vitamin D deficiency    Testosterone deficiency  -     Ambulatory referral to Endocrinology    Obstructive sleep apnea syndrome  -     Ambulatory referral to Sleep Medicine; Future          Subjective:      Patient ID: Kendra Gibson is a 42 y o  male  58-year-old male with a history of hypogonadism for about two years who has been on testosterone replacement therapy with [employer]  Despite testosterone replacement, he still has fatigue, excessive daytime sleepiness and difficulty concentrating  He does admit to snoring  He denies any mood changes  There is no family history of Testosterone disorder as far as he can tell  He denies any change in shaving, decreased strength or erectile dysfunction          The following portions of the patient's history were reviewed and updated as appropriate: allergies, current medications, past family history, past medical history, past social history, past surgical history and problem list     Review of Systems   Constitutional: Negative for chills and fever  HENT:        He admits to snoring  Respiratory: Negative for shortness of breath  Cardiovascular: Negative for chest pain  Gastrointestinal: Negative for constipation, diarrhea, nausea and vomiting  All other systems reviewed and are negative  Objective:      /70   Pulse 60   Ht 5' 7 5" (1 715 m)   Wt 89 5 kg (197 lb 6 4 oz)   BMI 30 46 kg/m²           Physical Exam   Constitutional: He is oriented to person, place, and time  He appears well-developed and well-nourished  No distress  HENT:   Head: Normocephalic and atraumatic  Mouth/Throat: Oropharynx is clear and moist and mucous membranes are normal  No oropharyngeal exudate  The nose is deviated  Eyes: Conjunctivae, EOM and lids are normal  Right eye exhibits no discharge  Left eye exhibits no discharge  No scleral icterus  Neck: Neck supple  No thyromegaly present  Neck circumference is 16 5 inches  Cardiovascular: Normal rate, regular rhythm and normal heart sounds  Exam reveals no gallop and no friction rub  No murmur heard  Pulmonary/Chest: Effort normal and breath sounds normal  No respiratory distress  He has no wheezes  Abdominal: Soft  Bowel sounds are normal  He exhibits no distension  There is no tenderness  Musculoskeletal: Normal range of motion  He exhibits no edema, tenderness or deformity  Lymphadenopathy:        Head (right side): No occipital adenopathy present  Head (left side): No occipital adenopathy present  Right: No supraclavicular adenopathy present  Left: No supraclavicular adenopathy present  Neurological: He is alert and oriented to person, place, and time  No cranial nerve deficit   Coordination normal    Skin: Skin is warm and intact  No rash noted  He is not diaphoretic  No erythema  Psychiatric: He has a normal mood and affect  Vitals reviewed

## 2018-10-26 NOTE — PROGRESS NOTES
Assessment/Plan:    Testicular hypogonadism  His testosterone level is in the target range  Continue current testosterone replacement therapy  Fatigue  Almost all of the workup for the fatigue has been normal   I suspect that he may have sleep apnea playing a role in his fatigue, excessive daytime sleepiness and difficulty concentrating  I have referred him to Dr Chrissie Collet for evaluation and treatment  Since his testosterone level is adequate, I will plan to see him as needed  He will follow with his primary care physician for his healthcare needs  Diagnoses and all orders for this visit:    Testicular hypogonadism    Vitamin D deficiency    Testosterone deficiency  -     Ambulatory referral to Endocrinology    Obstructive sleep apnea syndrome  -     Ambulatory referral to Sleep Medicine; Future          Subjective:      Patient ID: Carron Curling is a 42 y o  male  57-year-old male with a history of hypogonadism for about two years who has been on testosterone replacement therapy with [employer]  Despite testosterone replacement, he still has fatigue, excessive daytime sleepiness and difficulty concentrating  He does admit to snoring  He denies any mood changes  There is no family history of Testosterone disorder as far as he can tell  He denies any change in shaving, decreased strength or erectile dysfunction  The following portions of the patient's history were reviewed and updated as appropriate: allergies, current medications, past family history, past medical history, past social history, past surgical history and problem list     Review of Systems   Constitutional: Negative for chills and fever  HENT:        He admits to snoring  Respiratory: Negative for shortness of breath  Cardiovascular: Negative for chest pain  Gastrointestinal: Negative for constipation, diarrhea, nausea and vomiting  All other systems reviewed and are negative          Objective:      /70   Pulse 60 Ht 5' 7 5" (1 715 m)   Wt 89 5 kg (197 lb 6 4 oz)   BMI 30 46 kg/m²          Physical Exam   Constitutional: He is oriented to person, place, and time  He appears well-developed and well-nourished  No distress  HENT:   Head: Normocephalic and atraumatic  Mouth/Throat: Oropharynx is clear and moist and mucous membranes are normal  No oropharyngeal exudate  The nose is deviated  Eyes: Conjunctivae, EOM and lids are normal  Right eye exhibits no discharge  Left eye exhibits no discharge  No scleral icterus  Neck: Neck supple  No thyromegaly present  Neck circumference is 16 5 inches  Cardiovascular: Normal rate, regular rhythm and normal heart sounds  Exam reveals no gallop and no friction rub  No murmur heard  Pulmonary/Chest: Effort normal and breath sounds normal  No respiratory distress  He has no wheezes  Abdominal: Soft  Bowel sounds are normal  He exhibits no distension  There is no tenderness  Musculoskeletal: Normal range of motion  He exhibits no edema, tenderness or deformity  Lymphadenopathy:        Head (right side): No occipital adenopathy present  Head (left side): No occipital adenopathy present  Right: No supraclavicular adenopathy present  Left: No supraclavicular adenopathy present  Neurological: He is alert and oriented to person, place, and time  No cranial nerve deficit  Coordination normal    Skin: Skin is warm and intact  No rash noted  He is not diaphoretic  No erythema  Psychiatric: He has a normal mood and affect  Vitals reviewed

## 2018-11-13 ENCOUNTER — TELEPHONE (OUTPATIENT)
Dept: FAMILY MEDICINE CLINIC | Facility: CLINIC | Age: 38
End: 2018-11-13

## 2018-11-13 DIAGNOSIS — F41.1 GAD (GENERALIZED ANXIETY DISORDER): ICD-10-CM

## 2018-11-13 DIAGNOSIS — E29.1 TESTICULAR HYPOGONADISM: ICD-10-CM

## 2018-11-13 RX ORDER — CITALOPRAM 40 MG/1
40 TABLET ORAL DAILY
Qty: 30 TABLET | Refills: 5 | Status: CANCELLED | OUTPATIENT
Start: 2018-11-13

## 2018-11-13 RX ORDER — TESTOSTERONE 40.5 MG/2.5G
40.5 GEL TOPICAL DAILY
Qty: 2.5 G | Refills: 0 | Status: CANCELLED | OUTPATIENT
Start: 2018-11-13 | End: 2018-12-13

## 2018-11-13 RX ORDER — BUSPIRONE HYDROCHLORIDE 5 MG/1
5 TABLET ORAL 3 TIMES DAILY
Qty: 90 TABLET | Refills: 5 | Status: CANCELLED | OUTPATIENT
Start: 2018-11-13

## 2018-11-13 NOTE — TELEPHONE ENCOUNTER
From: Lin Araya  Sent: 11/13/2018 7:52 AM EST  Subject: Medication Renewal Request    Lin Marshal would like a refill of the following medications:     citalopram (CeleXA) 40 mg tablet [Orly Cortes PA-C]   Patient Comment: Please put me on a refill plan so I do not have to ask for a refill every time I need one, thank you  Pharmacy info: [address] Phone: [phone]     busPIRone (BUSPAR) 5 mg tablet Everlina Mortimer Astorino, PA-C]   Patient Comment: Please put me on a refill plan so I do not have to ask for a refill every time I need one, thank you  Pharmacy info: [facility] [address] Phone: [phone]    Preferred pharmacy: [address] : [id number]        Medication renewals requested in this message routed separately:     Testosterone (ANDROGEL) 40 5 MG/2 5GM (1 62%) GEL RL Beckwith]   Patient Comment: Please put me on a refill plan so I do not have to ask for a refill every time I need one, thank you   Pharmacy info: [facility] [address] Phone: [phone]

## 2018-11-13 NOTE — TELEPHONE ENCOUNTER
Was going to order a medication for pt because he requested it, when I went to go refill the medication and pend it, it said that that medication was pending already  I looked into his encounters and it looks like [facility] filled the medication   Please advise, thanks

## 2018-11-13 NOTE — TELEPHONE ENCOUNTER
From: Heavenly Hebert  Sent: 11/13/2018 7:52 AM EST  Subject: Medication Renewal Request    Heavenly Hebert would like a refill of the following medications:     Testosterone (ANDROGEL) 40 5 MG/2 5GM (1 62%) GEL RL [place]]   Patient Comment: Please put me on a refill plan so I do not have to ask for a refill every time I need one, thank you  Pharmacy info: [facility] [address] Local Phone: [phone]    Preferred pharmacy: [address] : [id number]        Medication renewals requested in this message routed separately:     citalopram (CeleXA) 40 mg tablet [Orly Cortes PA-C]   Patient Comment: Please put me on a refill plan so I do not have to ask for a refill every time I need one, thank you  Pharmacy info: [facility] [address] [place] [address] Local Phone: [phone]     busPIRone (BUSPAR) 5 mg tablet Julianna Cortes PA-C]   Patient Comment: Please put me on a refill plan so I do not have to ask for a refill every time I need one, thank you   Pharmacy info: [facility] [address] Local Phone: [phone]

## 2018-11-14 DIAGNOSIS — F41.1 GAD (GENERALIZED ANXIETY DISORDER): ICD-10-CM

## 2018-11-14 DIAGNOSIS — E29.1 TESTICULAR HYPOGONADISM: ICD-10-CM

## 2018-11-14 RX ORDER — BUSPIRONE HYDROCHLORIDE 5 MG/1
5 TABLET ORAL 3 TIMES DAILY
Qty: 90 TABLET | Refills: 0 | Status: CANCELLED | OUTPATIENT
Start: 2018-11-14

## 2018-11-14 RX ORDER — CITALOPRAM 40 MG/1
40 TABLET ORAL DAILY
Qty: 30 TABLET | Refills: 0 | Status: CANCELLED | OUTPATIENT
Start: 2018-11-14

## 2018-11-14 NOTE — TELEPHONE ENCOUNTER
From: Conrad Barthel  Sent: 11/14/2018 1:03 PM EST  Subject: Medication Renewal Request    Renan Barthel would like a refill of the following medications:     Testosterone (ANDROGEL) 40 5 MG/2 5GM (1 62%) GEL RL [place]]    Preferred pharmacy: [address] : [id number]        Medication renewals requested in this message routed separately:     citalopram (CeleXA) 40 mg tablet [Orly Cortes PA-C]     busPIRone (BUSPAR) 5 mg tablet [Orly Cortes PA-C]

## 2018-11-15 RX ORDER — TESTOSTERONE 40.5 MG/2.5G
40.5 GEL TOPICAL DAILY
Qty: 2.5 G | Refills: 0 | Status: SHIPPED | OUTPATIENT
Start: 2018-11-15 | End: 2018-12-17 | Stop reason: SDUPTHER

## 2018-11-15 RX ORDER — BUSPIRONE HYDROCHLORIDE 5 MG/1
5 TABLET ORAL 3 TIMES DAILY
Qty: 90 TABLET | Refills: 5 | Status: SHIPPED | OUTPATIENT
Start: 2018-11-15 | End: 2018-12-19 | Stop reason: SDUPTHER

## 2018-11-15 RX ORDER — CITALOPRAM 40 MG/1
40 TABLET ORAL DAILY
Qty: 30 TABLET | Refills: 5 | Status: SHIPPED | OUTPATIENT
Start: 2018-11-15 | End: 2019-07-16 | Stop reason: ALTCHOICE

## 2018-11-15 NOTE — TELEPHONE ENCOUNTER
His old PCP some how got the message again and refilled his celexa and buspar last month  Not androgen  All three refilled, advised patient to make sure only requesting from our office and only one provider managing medications  [place] prescription drug monitoring program was checked and verified for refill

## 2018-11-19 RX ORDER — CITALOPRAM 40 MG/1
40 TABLET ORAL DAILY
Qty: 30 TABLET | Refills: 0 | OUTPATIENT
Start: 2018-11-19

## 2018-11-19 RX ORDER — BUSPIRONE HYDROCHLORIDE 5 MG/1
5 TABLET ORAL 3 TIMES DAILY
Qty: 90 TABLET | Refills: 0 | OUTPATIENT
Start: 2018-11-19

## 2018-11-19 NOTE — TELEPHONE ENCOUNTER
From: Mike Beach  Sent: 11/14/2018 1:03 PM EST  Subject: Medication Renewal Request    Mike Beach would like a refill of the following medications:     citalopram (CeleXA) 40 mg tablet [Orly Cortes PA-C]     busPIRone (BUSPAR) 5 mg tablet [Orly Cortes PA-C]    Preferred pharmacy: [place] [facility] [address] : [id number]        Medication renewals requested in this message routed separately:     Testosterone (ANDROGEL) 40 5 MG/2 5GM (1 62%) GEL RL Kumar]

## 2018-12-03 ENCOUNTER — TELEPHONE (OUTPATIENT)
Dept: FAMILY MEDICINE CLINIC | Facility: CLINIC | Age: 38
End: 2018-12-03

## 2018-12-03 NOTE — TELEPHONE ENCOUNTER
[facility]      ----- Message from [address] sent at 12/3/2018  1:00 PM EST -----  He needs follow up with me for anxiety around December 26th or so  Anytime after that   Thank you

## 2018-12-11 NOTE — PATIENT INSTRUCTIONS
Complete lab work, this is fasting  We will give you a call with the results and go from there  Call us if you experience any worsening symptoms or no improvement  Patient just got the pump set up today and was trained by diabetic educator, Bridget-she set patient up today.  He will My chart this information to Dr. Yung when he gets back home-he doesn't have the setting information on him now.  LENI Hoffman RN

## 2018-12-17 DIAGNOSIS — E29.1 TESTICULAR HYPOGONADISM: ICD-10-CM

## 2018-12-17 NOTE — TELEPHONE ENCOUNTER
From: Indira Hernandez  Sent: 12/17/2018 7:03 AM EST  Subject: Medication Renewal Request    Indira Hernandez would like a refill of the following medications:     Testosterone (ANDROGEL) 40 5 MG/2 5GM (1 62%) GEL RL [place]]    Preferred pharmacy: [facility]  : [facility]

## 2018-12-18 DIAGNOSIS — F41.9 ANXIETY: Primary | ICD-10-CM

## 2018-12-19 DIAGNOSIS — F41.1 GAD (GENERALIZED ANXIETY DISORDER): ICD-10-CM

## 2018-12-19 RX ORDER — TESTOSTERONE 40.5 MG/2.5G
40.5 GEL TOPICAL DAILY
Qty: 2.5 G | Refills: 2 | Status: SHIPPED | OUTPATIENT
Start: 2018-12-19 | End: 2019-03-29 | Stop reason: SDUPTHER

## 2018-12-19 RX ORDER — BUSPIRONE HYDROCHLORIDE 10 MG/1
10 TABLET ORAL 3 TIMES DAILY
Qty: 90 TABLET | Refills: 5 | Status: SHIPPED | OUTPATIENT
Start: 2018-12-19 | End: 2019-03-04 | Stop reason: SDUPTHER

## 2018-12-19 RX ORDER — BUSPIRONE HYDROCHLORIDE 10 MG/1
10 TABLET ORAL 3 TIMES DAILY
Qty: 90 TABLET | Refills: 3 | Status: SHIPPED | OUTPATIENT
Start: 2018-12-19 | End: 2019-03-04

## 2019-01-21 DIAGNOSIS — F41.8 ANXIETY WITH DEPRESSION: Primary | ICD-10-CM

## 2019-01-21 NOTE — TELEPHONE ENCOUNTER
10mg tabs are not available, is it okay to send 5mg- 2 tabs TID? If so please authorize med pending

## 2019-01-22 RX ORDER — BUSPIRONE HYDROCHLORIDE 5 MG/1
TABLET ORAL
Qty: 180 TABLET | Refills: 0 | Status: SHIPPED | OUTPATIENT
Start: 2019-01-22 | End: 2019-03-04

## 2019-01-22 NOTE — TELEPHONE ENCOUNTER
Please double-checked patient's medical record as it looks like 90  Tabs were sent with refills back in December  He should not need a refill at this time

## 2019-01-22 NOTE — TELEPHONE ENCOUNTER
He takes Buspar 3 times a day making it 90 a month  Pharmacy called stating they only had 5mg, that is why I sent 5mg- 2tabs TID for 30 days  Please advise

## 2019-03-04 DIAGNOSIS — F41.8 ANXIETY WITH DEPRESSION: ICD-10-CM

## 2019-03-04 DIAGNOSIS — F41.1 GAD (GENERALIZED ANXIETY DISORDER): ICD-10-CM

## 2019-03-04 RX ORDER — BUSPIRONE HYDROCHLORIDE 10 MG/1
10 TABLET ORAL 3 TIMES DAILY
Qty: 90 TABLET | Refills: 0 | Status: SHIPPED | OUTPATIENT
Start: 2019-03-04 | End: 2019-04-22 | Stop reason: SDUPTHER

## 2019-03-05 RX ORDER — BUSPIRONE HYDROCHLORIDE 5 MG/1
TABLET ORAL
Qty: 180 TABLET | Refills: 0 | Status: SHIPPED | OUTPATIENT
Start: 2019-03-05 | End: 2019-04-22

## 2019-03-06 ENCOUNTER — TELEPHONE (OUTPATIENT)
Dept: FAMILY MEDICINE CLINIC | Facility: CLINIC | Age: 39
End: 2019-03-06

## 2019-03-06 NOTE — TELEPHONE ENCOUNTER
Called pt to see if we could send Buspar 5mg to a different pharmacy, the med is on backorder at most [facility]  LM for him to call back with an updated pharm

## 2019-03-29 DIAGNOSIS — E29.1 TESTICULAR HYPOGONADISM: ICD-10-CM

## 2019-03-29 PROBLEM — S05.02XA INJURY OF CONJUNCTIVA AND CORNEAL ABRASION OF LEFT EYE WITHOUT FOREIGN BODY: Status: ACTIVE | Noted: 2019-03-29

## 2019-03-29 RX ORDER — TESTOSTERONE 40.5 MG/2.5G
GEL TOPICAL
Qty: 75 G | Refills: 2 | OUTPATIENT
Start: 2019-03-29

## 2019-03-30 RX ORDER — TESTOSTERONE 40.5 MG/2.5G
40.5 GEL TOPICAL DAILY
Qty: 2.5 G | Refills: 0 | Status: SHIPPED | OUTPATIENT
Start: 2019-03-30 | End: 2019-07-16 | Stop reason: ALTCHOICE

## 2019-04-01 DIAGNOSIS — E29.1 TESTICULAR HYPOGONADISM: ICD-10-CM

## 2019-04-01 RX ORDER — TESTOSTERONE 40.5 MG/2.5G
40.5 GEL TOPICAL DAILY
Qty: 2.5 G | Refills: 0 | OUTPATIENT
Start: 2019-04-01 | End: 2019-05-01

## 2019-04-22 DIAGNOSIS — F41.1 GAD (GENERALIZED ANXIETY DISORDER): ICD-10-CM

## 2019-04-22 RX ORDER — BUSPIRONE HYDROCHLORIDE 10 MG/1
TABLET ORAL
Qty: 90 TABLET | Refills: 0 | Status: SHIPPED | OUTPATIENT
Start: 2019-04-22 | End: 2019-06-05 | Stop reason: SDUPTHER

## 2019-04-22 RX ORDER — BUSPIRONE HYDROCHLORIDE 10 MG/1
10 TABLET ORAL 3 TIMES DAILY
Qty: 90 TABLET | Refills: 0 | OUTPATIENT
Start: 2019-04-22 | End: 2019-05-22

## 2019-04-26 ENCOUNTER — OFFICE VISIT (OUTPATIENT)
Dept: ENDOCRINOLOGY | Facility: CLINIC | Age: 39
End: 2019-04-26
Payer: COMMERCIAL

## 2019-04-26 VITALS
WEIGHT: 201.6 LBS | HEART RATE: 65 BPM | BODY MASS INDEX: 30.55 KG/M2 | DIASTOLIC BLOOD PRESSURE: 70 MMHG | HEIGHT: 68 IN | SYSTOLIC BLOOD PRESSURE: 118 MMHG

## 2019-04-26 DIAGNOSIS — G47.19 EXCESSIVE DAYTIME SLEEPINESS: ICD-10-CM

## 2019-04-26 DIAGNOSIS — E29.1 TESTICULAR HYPOGONADISM: Primary | ICD-10-CM

## 2019-04-26 DIAGNOSIS — E55.9 VITAMIN D DEFICIENCY: ICD-10-CM

## 2019-04-26 DIAGNOSIS — R53.83 FATIGUE, UNSPECIFIED TYPE: ICD-10-CM

## 2019-04-26 PROCEDURE — 99214 OFFICE O/P EST MOD 30 MIN: CPT | Performed by: INTERNAL MEDICINE

## 2019-05-31 ENCOUNTER — OFFICE VISIT (OUTPATIENT)
Dept: SLEEP CENTER | Facility: CLINIC | Age: 39
End: 2019-05-31
Payer: COMMERCIAL

## 2019-05-31 VITALS
WEIGHT: 196 LBS | SYSTOLIC BLOOD PRESSURE: 108 MMHG | HEIGHT: 68 IN | BODY MASS INDEX: 29.7 KG/M2 | DIASTOLIC BLOOD PRESSURE: 60 MMHG

## 2019-05-31 DIAGNOSIS — R53.83 FATIGUE, UNSPECIFIED TYPE: ICD-10-CM

## 2019-05-31 DIAGNOSIS — G47.19 EXCESSIVE DAYTIME SLEEPINESS: Primary | ICD-10-CM

## 2019-05-31 DIAGNOSIS — E66.3 OVERWEIGHT (BMI 25.0-29.9): ICD-10-CM

## 2019-05-31 DIAGNOSIS — F41.9 ANXIETY: ICD-10-CM

## 2019-05-31 DIAGNOSIS — J34.2 DEVIATED NASAL SEPTUM: ICD-10-CM

## 2019-05-31 DIAGNOSIS — E29.1 TESTICULAR HYPOGONADISM: ICD-10-CM

## 2019-05-31 PROCEDURE — 99244 OFF/OP CNSLTJ NEW/EST MOD 40: CPT | Performed by: INTERNAL MEDICINE

## 2019-06-05 DIAGNOSIS — F41.1 GAD (GENERALIZED ANXIETY DISORDER): ICD-10-CM

## 2019-06-06 DIAGNOSIS — F41.1 GAD (GENERALIZED ANXIETY DISORDER): ICD-10-CM

## 2019-06-06 RX ORDER — BUSPIRONE HYDROCHLORIDE 10 MG/1
10 TABLET ORAL 3 TIMES DAILY
Qty: 90 TABLET | Refills: 0 | Status: SHIPPED | OUTPATIENT
Start: 2019-06-06 | End: 2019-07-19 | Stop reason: SDUPTHER

## 2019-06-06 RX ORDER — BUSPIRONE HYDROCHLORIDE 10 MG/1
TABLET ORAL
Qty: 90 TABLET | Refills: 0 | OUTPATIENT
Start: 2019-06-06

## 2019-06-06 RX ORDER — BUSPIRONE HYDROCHLORIDE 10 MG/1
10 TABLET ORAL 3 TIMES DAILY
Qty: 90 TABLET | Refills: 0 | OUTPATIENT
Start: 2019-06-06

## 2019-07-16 ENCOUNTER — TELEPHONE (OUTPATIENT)
Dept: FAMILY MEDICINE CLINIC | Facility: CLINIC | Age: 39
End: 2019-07-16

## 2019-07-16 ENCOUNTER — OFFICE VISIT (OUTPATIENT)
Dept: FAMILY MEDICINE CLINIC | Facility: CLINIC | Age: 39
End: 2019-07-16
Payer: COMMERCIAL

## 2019-07-16 VITALS
BODY MASS INDEX: 30.16 KG/M2 | HEART RATE: 50 BPM | SYSTOLIC BLOOD PRESSURE: 116 MMHG | OXYGEN SATURATION: 97 % | DIASTOLIC BLOOD PRESSURE: 78 MMHG | TEMPERATURE: 97.4 F | HEIGHT: 68 IN | WEIGHT: 199 LBS

## 2019-07-16 DIAGNOSIS — E55.9 VITAMIN D DEFICIENCY: ICD-10-CM

## 2019-07-16 DIAGNOSIS — R73.01 ELEVATED FASTING GLUCOSE: ICD-10-CM

## 2019-07-16 DIAGNOSIS — F41.1 GENERALIZED ANXIETY DISORDER: ICD-10-CM

## 2019-07-16 DIAGNOSIS — E29.1 TESTICULAR HYPOGONADISM: ICD-10-CM

## 2019-07-16 DIAGNOSIS — Z00.00 HEALTH MAINTENANCE EXAMINATION: Primary | ICD-10-CM

## 2019-07-16 DIAGNOSIS — R53.83 FATIGUE, UNSPECIFIED TYPE: ICD-10-CM

## 2019-07-16 DIAGNOSIS — E66.09 CLASS 1 OBESITY DUE TO EXCESS CALORIES WITHOUT SERIOUS COMORBIDITY WITH BODY MASS INDEX (BMI) OF 30.0 TO 30.9 IN ADULT: ICD-10-CM

## 2019-07-16 PROBLEM — E66.811 CLASS 1 OBESITY DUE TO EXCESS CALORIES WITHOUT SERIOUS COMORBIDITY WITH BODY MASS INDEX (BMI) OF 30.0 TO 30.9 IN ADULT: Status: ACTIVE | Noted: 2019-07-16

## 2019-07-16 PROCEDURE — 99396 PREV VISIT EST AGE 40-64: CPT | Performed by: NURSE PRACTITIONER

## 2019-07-16 RX ORDER — ERGOCALCIFEROL 1.25 MG/1
50000 CAPSULE ORAL WEEKLY
Qty: 4 CAPSULE | Refills: 1 | Status: SHIPPED | OUTPATIENT
Start: 2019-07-16 | End: 2019-09-06 | Stop reason: SDUPTHER

## 2019-07-16 NOTE — PATIENT INSTRUCTIONS
Complete lab work, this is fasting  We will follow up with results  We will reach out to the sleep lab to see if there are any openings prior to your scheduled appointment  Please call the office if you are experiencing any worsening of symptoms or no symptom improvement  Wellness Visit for Adults   AMBULATORY CARE:   A wellness visit  is when you see your healthcare provider to get screened for health problems  You can also get advice on how to stay healthy  Write down your questions so you remember to ask them  Ask your healthcare provider how often you should have a wellness visit  What happens at a wellness visit:  Your healthcare provider will ask about your health, and your family history of health problems  This includes high blood pressure, heart disease, and cancer  He or she will ask if you have symptoms that concern you, if you smoke, and about your mood  You may also be asked about your intake of medicines, supplements, food, and alcohol  Any of the following may be done:  · Your weight  will be checked  Your height may also be checked so your body mass index (BMI) can be calculated  Your BMI shows if you are at a healthy weight  · Your blood pressure  and heart rate will be checked  Your temperature may also be checked  · Blood and urine tests  may be done  Blood tests may be done to check your cholesterol levels  Abnormal cholesterol levels increase your risk for heart disease and stroke  You may also need a blood or urine test to check for diabetes if you are at increased risk  Urine tests may be done to look for signs of an infection or kidney disease  · A physical exam  includes checking your heartbeat and lungs with a stethoscope  Your healthcare provider may also check your skin to look for sun damage  · Screening tests  may be recommended  A screening test is done to check for diseases that may not cause symptoms   The screening tests you may need depend on your age, gender, family history, and lifestyle habits  For example, colorectal screening may be recommended if you are 48years old or older  Screening tests you need if you are a woman:   · A Pap smear  is used to screen for cervical cancer  Pap smears are usually done every 3 to 5 years depending on your age  You may need them more often if you have had abnormal Pap smear test results in the past  Ask your healthcare provider how often you should have a Pap smear  · A mammogram  is an x-ray of your breasts to screen for breast cancer  Experts recommend mammograms every 2 years starting at age 48 years  You may need a mammogram at age 52 years or younger if you have an increased risk for breast cancer  Talk to your healthcare provider about when you should start having mammograms and how often you need them  Vaccines you may need:   · Get an influenza vaccine  every year  The influenza vaccine protects you from the flu  Several types of viruses cause the flu  The viruses change over time, so new vaccines are made each year  · Get a tetanus-diphtheria (Td) booster vaccine  every 10 years  This vaccine protects you against tetanus and diphtheria  Tetanus is a severe infection that may cause painful muscle spasms and lockjaw  Diphtheria is a severe bacterial infection that causes a thick covering in the back of your mouth and throat  · Get a human papillomavirus (HPV) vaccine  if you are female and aged 23 to 32 or male 23 to 24 and never received it  This vaccine protects you from HPV infection  HPV is the most common infection spread by sexual contact  HPV may also cause vaginal, penile, and anal cancers  · Get a pneumococcal vaccine  if you are aged 72 years or older  The pneumococcal vaccine is an injection given to protect you from pneumococcal disease  Pneumococcal disease is an infection caused by pneumococcal bacteria  The infection may cause pneumonia, meningitis, or an ear infection      · Get a shingles vaccine  if you are aged 61 or older, even if you have had shingles before  The shingles vaccine is an injection to protect you from the varicella-zoster virus  This is the same virus that causes chickenpox  Shingles is a painful rash that develops in people who had chickenpox or have been exposed to the virus  How to eat healthy:  My Plate is a model for planning healthy meals  It shows the types and amounts of foods that should go on your plate  Fruits and vegetables make up about half of your plate, and grains and protein make up the other half  A serving of dairy is included on the side of your plate  The amount of calories and serving sizes you need depends on your age, gender, weight, and height  Examples of healthy foods are listed below:  · Eat a variety of vegetables  such as dark green, red, and orange vegetables  You can also include canned vegetables low in sodium (salt) and frozen vegetables without added butter or sauces  · Eat a variety of fresh fruits , canned fruit in 100% juice, frozen fruit, and dried fruit  · Include whole grains  At least half of the grains you eat should be whole grains  Examples include whole-wheat bread, wheat pasta, brown rice, and whole-grain cereals such as oatmeal     · Eat a variety of protein foods such as seafood (fish and shellfish), lean meat, and poultry without skin (turkey and chicken)  Examples of lean meats include pork leg, shoulder, or tenderloin, and beef round, sirloin, tenderloin, and extra lean ground beef  Other protein foods include eggs and egg substitutes, beans, peas, soy products, nuts, and seeds  · Choose low-fat dairy products such as skim or 1% milk or low-fat yogurt, cheese, and cottage cheese  · Limit unhealthy fats  such as butter, hard margarine, and shortening  Exercise:  Exercise at least 30 minutes per day on most days of the week  Some examples of exercise include walking, biking, dancing, and swimming   You can also fit in more physical activity by taking the stairs instead of the elevator or parking farther away from stores  Include muscle strengthening activities 2 days each week  Regular exercise provides many health benefits  It helps you manage your weight, and decreases your risk for type 2 diabetes, heart disease, stroke, and high blood pressure  Exercise can also help improve your mood  Ask your healthcare provider about the best exercise plan for you  General health and safety guidelines:   · Do not smoke  Nicotine and other chemicals in cigarettes and cigars can cause lung damage  Ask your healthcare provider for information if you currently smoke and need help to quit  E-cigarettes or smokeless tobacco still contain nicotine  Talk to your healthcare provider before you use these products  · Limit alcohol  A drink of alcohol is 12 ounces of beer, 5 ounces of wine, or 1½ ounces of liquor  · Lose weight, if needed  Being overweight increases your risk of certain health conditions  These include heart disease, high blood pressure, type 2 diabetes, and certain types of cancer  · Protect your skin  Do not sunbathe or use tanning beds  Use sunscreen with a SPF 15 or higher  Apply sunscreen at least 15 minutes before you go outside  Reapply sunscreen every 2 hours  Wear protective clothing, hats, and sunglasses when you are outside  · Drive safely  Always wear your seatbelt  Make sure everyone in your car wears a seatbelt  A seatbelt can save your life if you are in an accident  Do not use your cell phone when you are driving  This could distract you and cause an accident  Pull over if you need to make a call or send a text message  · Practice safe sex  Use latex condoms if are sexually active and have more than one partner  Your healthcare provider may recommend screening tests for sexually transmitted infections (STIs)  · Wear helmets, lifejackets, and protective gear    Always wear a helmet when you ride a bike or motorcycle, go skiing, or play sports that could cause a head injury  Wear protective equipment when you play sports  Wear a lifejacket when you are on a boat or doing water sports  © 2017 [address] [employer] Information is for End User's use only and may not be sold, redistributed or otherwise used for commercial purposes  All illustrations and images included in CareNotes® are the copyrighted property of A D A M , Inc  or [employer] Mathis  The above information is an  only  It is not intended as medical advice for individual conditions or treatments  Talk to your doctor, nurse or pharmacist before following any medical regimen to see if it is safe and effective for you

## 2019-07-16 NOTE — ASSESSMENT & PLAN NOTE
BMI Counseling: Body mass index is 30 26 kg/m²  Discussed the patient's BMI with him  The BMI is above average  BMI counseling and education was provided to the patient  Nutrition recommendations include reducing portion sizes, decreasing overall calorie intake, 3-5 servings of fruits/vegetables daily, reducing fast food intake, consuming healthier snacks, decreasing soda and/or juice intake, moderation in carbohydrate intake, increasing intake of lean protein, reducing intake of saturated fat and trans fat and reducing intake of cholesterol  Exercise recommendations include moderate aerobic physical activity for 150 minutes/week, exercising 3-5 times per week, joining a gym and strength training exercises

## 2019-07-16 NOTE — PROGRESS NOTES
Assessment/Plan:    Health Maintenance   Diet: well balanced diet  Exercise: frequently  Dental: regular dental visits and brushes teeth twice daily  Vision: most recent eye exam <1 year and wears glasses  Preventative Health: Male Preventative: Exercises regularly  Lifestyle Advice: follow a low fat, low cholesterol diet, continue current medications, continue current healthy lifestyle patterns and return for routine annual checkups    Testicular hypogonadism  Will recheck labs  Needs sleep test prior to re-starting testosterone  Vitamin D deficiency  Will update labs    Fatigue  Will update baseline lab work to rule out possible causes, patient feels this is testosterone related  Generalized anxiety disorder  Stable on buspar    Class 1 obesity due to excess calories without serious comorbidity with body mass index (BMI) of 30 0 to 30 9 in adult  BMI Counseling: Body mass index is 30 26 kg/m²  Discussed the patient's BMI with him  The BMI is above average  BMI counseling and education was provided to the patient  Nutrition recommendations include reducing portion sizes, decreasing overall calorie intake, 3-5 servings of fruits/vegetables daily, reducing fast food intake, consuming healthier snacks, decreasing soda and/or juice intake, moderation in carbohydrate intake, increasing intake of lean protein, reducing intake of saturated fat and trans fat and reducing intake of cholesterol  Exercise recommendations include moderate aerobic physical activity for 150 minutes/week, exercising 3-5 times per week, joining a gym and strength training exercises  Follow up 6 months  Will follow up with lab work  Diagnoses and all orders for this visit:    Health maintenance examination    Vitamin D deficiency  -     ergocalciferol (VITAMIN D2) 50,000 units; Take 1 capsule (50,000 Units total) by mouth once a week  -     Vitamin D 25 hydroxy;  Future    Fatigue, unspecified type  -     TSH, 3rd generation with Free T4 reflex; Future  -     CBC and differential; Future    Testicular hypogonadism  -     Comprehensive metabolic panel; Future  -     Testosterone, free, total; Future    Elevated fasting glucose  -     Hemoglobin A1C; Future  -     Comprehensive metabolic panel; Future    Class 1 obesity due to excess calories without serious comorbidity with body mass index (BMI) of 30 0 to 30 9 in adult  -     TSH, 3rd generation with Free T4 reflex; Future  -     Lipid panel; Future  -     Comprehensive metabolic panel; Future    Generalized anxiety disorder      Patient verbalizes understand and agrees with treatment plan  Subjective:        Patient ID: Waylon Ocasio is a 34 y o  male  Chief Complaint   Patient presents with    Follow-up     regarding Buspar; states he feels "good"       Patient presents to office today for annual exam   Patient states he is overall feeling well  His main complaint is lack of energy and fatigue since stopping testosterone therapy  This therapy was stopped due to symptoms of obstructive sleep apnea which needs to be ruled out and/or treated prior to restarting testosterone therapy  Patient does have sleep study scheduled for end of August   He also is following with Endocrinology  The following portions of the patient's history were reviewed and updated as appropriate: allergies, current medications, past family history, past social history and problem list     Review of Systems   Constitutional: Positive for fatigue  Negative for chills and fever  HENT: Negative for congestion  Eyes: Negative for pain and visual disturbance  Respiratory: Negative for cough and shortness of breath  Cardiovascular: Negative for chest pain, palpitations and leg swelling  Gastrointestinal: Negative for abdominal pain, diarrhea, nausea and vomiting  Genitourinary: Negative for difficulty urinating and dysuria  Musculoskeletal: Negative for arthralgias and myalgias     Skin: Negative for color change and rash  Neurological: Negative for dizziness, syncope, numbness and headaches  Hematological: Negative for adenopathy  Psychiatric/Behavioral: Negative for agitation and behavioral problems  The patient is not nervous/anxious  Objective:  /78 (BP Location: Left arm, Patient Position: Sitting, Cuff Size: Standard)   Pulse (!) 50   Temp (!) 97 4 °F (36 3 °C) (Temporal)   Ht 5' 8" (1 727 m)   Wt 90 3 kg (199 lb)   SpO2 97%   BMI 30 26 kg/m²      Physical Exam   Constitutional: He is oriented to person, place, and time  He appears well-developed  No distress  obese   HENT:   Head: Normocephalic and atraumatic  Right Ear: External ear normal    Left Ear: External ear normal    Nose: Nose normal    Eyes: Conjunctivae and lids are normal  Right eye exhibits no discharge  Left eye exhibits no discharge  Neck: Normal range of motion  Neck supple  No tracheal deviation present  Cardiovascular: Normal rate and regular rhythm  No murmur heard  Pulmonary/Chest: Effort normal and breath sounds normal  No respiratory distress  He has no wheezes  Abdominal: Soft  Bowel sounds are normal  He exhibits no distension  There is no tenderness  There is no guarding  Musculoskeletal: Normal range of motion  He exhibits no edema, tenderness or deformity  Lymphadenopathy:     He has no cervical adenopathy  Neurological: He is alert and oriented to person, place, and time  Coordination normal    Skin: Skin is warm and dry  No rash noted  He is not diaphoretic  No erythema  Psychiatric: He has a normal mood and affect  His speech is normal and behavior is normal  Judgment and thought content normal  Cognition and memory are normal    Nursing note and vitals reviewed

## 2019-07-16 NOTE — ASSESSMENT & PLAN NOTE
Will update baseline lab work to rule out possible causes, patient feels this is testosterone related

## 2019-07-19 DIAGNOSIS — F41.1 GAD (GENERALIZED ANXIETY DISORDER): ICD-10-CM

## 2019-07-19 RX ORDER — BUSPIRONE HYDROCHLORIDE 10 MG/1
10 TABLET ORAL 3 TIMES DAILY
Qty: 90 TABLET | Refills: 5 | Status: SHIPPED | OUTPATIENT
Start: 2019-07-19 | End: 2020-03-30

## 2019-07-19 NOTE — TELEPHONE ENCOUNTER
See note from patient  You can respond to him and let him know we were not giving him refills because it was due for a visit  This new script will have refills

## 2019-07-24 ENCOUNTER — TELEPHONE (OUTPATIENT)
Dept: ENDOCRINOLOGY | Facility: CLINIC | Age: 39
End: 2019-07-24

## 2019-07-25 ENCOUNTER — TELEPHONE (OUTPATIENT)
Dept: SLEEP CENTER | Facility: CLINIC | Age: 39
End: 2019-07-25

## 2019-07-25 DIAGNOSIS — G47.19 EXCESSIVE DAYTIME SLEEPINESS: Primary | ICD-10-CM

## 2019-07-29 ENCOUNTER — HOSPITAL ENCOUNTER (OUTPATIENT)
Dept: SLEEP CENTER | Facility: CLINIC | Age: 39
Discharge: HOME | End: 2019-07-29
Payer: COMMERCIAL

## 2019-07-29 DIAGNOSIS — G47.19 EXCESSIVE DAYTIME SLEEPINESS: ICD-10-CM

## 2019-07-29 PROCEDURE — G0399 HOME SLEEP TEST/TYPE 3 PORTA: HCPCS

## 2019-07-30 ENCOUNTER — TELEPHONE (OUTPATIENT)
Dept: SLEEP CENTER | Facility: CLINIC | Age: 39
End: 2019-07-30

## 2019-08-01 ENCOUNTER — TELEPHONE (OUTPATIENT)
Dept: SLEEP CENTER | Facility: CLINIC | Age: 39
End: 2019-08-01

## 2019-08-01 NOTE — TELEPHONE ENCOUNTER
Spoke with patient, advised sleep study did not reveal ROMEO    Will discuss further at office visit 9/6/19

## 2019-08-05 ENCOUNTER — TELEPHONE (OUTPATIENT)
Dept: FAMILY MEDICINE CLINIC | Facility: CLINIC | Age: 39
End: 2019-08-05

## 2019-08-05 ENCOUNTER — TELEPHONE (OUTPATIENT)
Dept: ENDOCRINOLOGY | Facility: CLINIC | Age: 39
End: 2019-08-05

## 2019-08-05 NOTE — TELEPHONE ENCOUNTER
Called endoCesar Aas is going to send message to MA or Doctor to have signed ff on testosterone, also has an appt here 8/20/19  Pt is aware

## 2019-08-05 NOTE — TELEPHONE ENCOUNTER
----- Message from Cassandra Mooney, 10 Saw  sent at 8/1/2019  1:38 PM EDT -----  Please notify his endo office as they are managing his testosterone   They can then reach out to him   ----- Message -----  From: Ketan Gallegos MD  Sent: 7/31/2019   5:16 PM EDT  To: Sudhakar Ro

## 2019-08-05 NOTE — TELEPHONE ENCOUNTER
Pt had sleep study done and it was deemed no sleep apnea  He would like to start his testosterone treatments again  Please advise    The results of the study are found in the media part of his chart

## 2019-08-05 NOTE — TELEPHONE ENCOUNTER
Crystal from [facility] called because they received a refill request on patient's testosterone  Patient is following with our office for his testosterone treatments so please send in a refill to the [facility] on [address] in [place]  Thanks

## 2019-08-06 DIAGNOSIS — E29.1 TESTICULAR HYPOGONADISM: Primary | ICD-10-CM

## 2019-08-08 ENCOUNTER — TELEPHONE (OUTPATIENT)
Dept: ENDOCRINOLOGY | Facility: CLINIC | Age: 39
End: 2019-08-08

## 2019-08-08 RX ORDER — TESTOSTERONE 16.2 MG/G
40.5 GEL TRANSDERMAL EVERY MORNING
Qty: 180 ACTUATION | Refills: 1 | Status: SHIPPED | OUTPATIENT
Start: 2019-08-08 | End: 2019-12-16 | Stop reason: SDUPTHER

## 2019-08-08 NOTE — TELEPHONE ENCOUNTER
Pharmacy called to clarify medication, they will be giving him one month with 3 refill otherwise the way it is written it would be a 3 month supply

## 2019-08-10 LAB
25(OH)D3 SERPL-MCNC: 59 NG/ML (ref 30–100)
ALBUMIN SERPL-MCNC: 4.3 G/DL (ref 3.6–5.1)
ALBUMIN/GLOB SERPL: 2 (CALC) (ref 1–2.5)
ALP SERPL-CCNC: 62 U/L (ref 40–115)
ALT SERPL-CCNC: 19 U/L (ref 9–46)
AST SERPL-CCNC: 23 U/L (ref 10–40)
BASOPHILS # BLD AUTO: 32 CELLS/UL (ref 0–200)
BASOPHILS NFR BLD AUTO: 0.5 %
BILIRUB SERPL-MCNC: 0.9 MG/DL (ref 0.2–1.2)
BUN SERPL-MCNC: 24 MG/DL (ref 7–25)
BUN/CREAT SERPL: ABNORMAL (CALC) (ref 6–22)
CALCIUM SERPL-MCNC: 9.5 MG/DL (ref 8.6–10.3)
CHLORIDE SERPL-SCNC: 107 MMOL/L (ref 98–110)
CHOLEST SERPL-MCNC: 207 MG/DL
CHOLEST/HDLC SERPL: 3.8 (CALC)
CO2 SERPL-SCNC: 28 MMOL/L (ref 20–32)
CREAT SERPL-MCNC: 1.13 MG/DL (ref 0.6–1.35)
EOSINOPHIL # BLD AUTO: 82 CELLS/UL (ref 15–500)
EOSINOPHIL NFR BLD AUTO: 1.3 %
ERYTHROCYTE [DISTWIDTH] IN BLOOD BY AUTOMATED COUNT: 12.6 % (ref 11–15)
GLOBULIN SER CALC-MCNC: 2.1 G/DL (CALC) (ref 1.9–3.7)
GLUCOSE SERPL-MCNC: 100 MG/DL (ref 65–99)
HBA1C MFR BLD: 5.4 % OF TOTAL HGB
HCT VFR BLD AUTO: 46.3 % (ref 38.5–50)
HDLC SERPL-MCNC: 54 MG/DL
HGB BLD-MCNC: 15.1 G/DL (ref 13.2–17.1)
LDLC SERPL CALC-MCNC: 136 MG/DL (CALC)
LYMPHOCYTES # BLD AUTO: 2237 CELLS/UL (ref 850–3900)
LYMPHOCYTES NFR BLD AUTO: 35.5 %
MCH RBC QN AUTO: 30.3 PG (ref 27–33)
MCHC RBC AUTO-ENTMCNC: 32.6 G/DL (ref 32–36)
MCV RBC AUTO: 92.8 FL (ref 80–100)
MONOCYTES # BLD AUTO: 655 CELLS/UL (ref 200–950)
MONOCYTES NFR BLD AUTO: 10.4 %
NEUTROPHILS # BLD AUTO: 3295 CELLS/UL (ref 1500–7800)
NEUTROPHILS NFR BLD AUTO: 52.3 %
NONHDLC SERPL-MCNC: 153 MG/DL (CALC)
PLATELET # BLD AUTO: 207 THOUSAND/UL (ref 140–400)
PMV BLD REES-ECKER: 11.2 FL (ref 7.5–12.5)
POTASSIUM SERPL-SCNC: 4.5 MMOL/L (ref 3.5–5.3)
PROT SERPL-MCNC: 6.4 G/DL (ref 6.1–8.1)
RBC # BLD AUTO: 4.99 MILLION/UL (ref 4.2–5.8)
SL AMB EGFR AFRICAN AMERICAN: 94 ML/MIN/1.73M2
SL AMB EGFR NON AFRICAN AMERICAN: 81 ML/MIN/1.73M2
SODIUM SERPL-SCNC: 140 MMOL/L (ref 135–146)
TESTOST FREE SERPL-MCNC: 63.3 PG/ML (ref 35–155)
TESTOST SERPL-MCNC: 443 NG/DL (ref 250–1100)
TRIGL SERPL-MCNC: 71 MG/DL
TSH SERPL-ACNC: 2.68 MIU/L (ref 0.4–4.5)
WBC # BLD AUTO: 6.3 THOUSAND/UL (ref 3.8–10.8)

## 2019-08-12 DIAGNOSIS — E78.5 HYPERLIPIDEMIA, UNSPECIFIED HYPERLIPIDEMIA TYPE: Primary | ICD-10-CM

## 2019-08-23 LAB
CHOLEST SERPL-MCNC: 213 MG/DL
CHOLEST/HDLC SERPL: 4.2 (CALC)
HDLC SERPL-MCNC: 51 MG/DL
LDLC SERPL CALC-MCNC: 144 MG/DL (CALC)
NONHDLC SERPL-MCNC: 162 MG/DL (CALC)
TRIGL SERPL-MCNC: 77 MG/DL

## 2019-08-26 DIAGNOSIS — E78.5 HYPERLIPIDEMIA, UNSPECIFIED HYPERLIPIDEMIA TYPE: Primary | ICD-10-CM

## 2019-09-06 DIAGNOSIS — E55.9 VITAMIN D DEFICIENCY: ICD-10-CM

## 2019-09-06 RX ORDER — ERGOCALCIFEROL 1.25 MG/1
50000 CAPSULE ORAL WEEKLY
Qty: 4 CAPSULE | Refills: 2 | Status: SHIPPED | OUTPATIENT
Start: 2019-09-06 | End: 2019-12-18 | Stop reason: SDUPTHER

## 2019-09-09 RX ORDER — ERGOCALCIFEROL 1.25 MG/1
CAPSULE ORAL
Qty: 4 CAPSULE | Refills: 1 | OUTPATIENT
Start: 2019-09-09

## 2019-12-16 DIAGNOSIS — E29.1 TESTICULAR HYPOGONADISM: ICD-10-CM

## 2019-12-16 RX ORDER — TESTOSTERONE 16.2 MG/G
GEL TRANSDERMAL
Qty: 75 G | Refills: 0 | OUTPATIENT
Start: 2019-12-16

## 2019-12-16 RX ORDER — TESTOSTERONE 16.2 MG/G
40.5 GEL TRANSDERMAL EVERY MORNING
Qty: 60 ACTUATION | Refills: 0 | OUTPATIENT
Start: 2019-12-16 | End: 2020-06-13

## 2019-12-16 RX ORDER — TESTOSTERONE 16.2 MG/G
40.5 GEL TRANSDERMAL EVERY MORNING
Qty: 180 ACTUATION | Refills: 0 | Status: SHIPPED | OUTPATIENT
Start: 2019-12-16 | End: 2020-01-20 | Stop reason: SDUPTHER

## 2019-12-16 NOTE — TELEPHONE ENCOUNTER
Patient is scheduled to see Dr Sachin Car on 1/24/20 (needed early AM for work reasons)  Please call in refill until that date  Thank you

## 2019-12-18 DIAGNOSIS — E55.9 VITAMIN D DEFICIENCY: ICD-10-CM

## 2019-12-19 RX ORDER — ERGOCALCIFEROL 1.25 MG/1
50000 CAPSULE ORAL WEEKLY
Qty: 4 CAPSULE | Refills: 0 | Status: SHIPPED | OUTPATIENT
Start: 2019-12-19 | End: 2020-01-19 | Stop reason: SDUPTHER

## 2020-01-16 ENCOUNTER — OFFICE VISIT (OUTPATIENT)
Dept: FAMILY MEDICINE CLINIC | Facility: CLINIC | Age: 40
End: 2020-01-16
Payer: COMMERCIAL

## 2020-01-16 VITALS
RESPIRATION RATE: 16 BRPM | HEART RATE: 44 BPM | HEIGHT: 68 IN | BODY MASS INDEX: 31.31 KG/M2 | SYSTOLIC BLOOD PRESSURE: 108 MMHG | WEIGHT: 206.6 LBS | OXYGEN SATURATION: 97 % | DIASTOLIC BLOOD PRESSURE: 60 MMHG | TEMPERATURE: 98 F

## 2020-01-16 DIAGNOSIS — R94.31 LEFT AXIS DEVIATION: ICD-10-CM

## 2020-01-16 DIAGNOSIS — E78.2 MIXED HYPERLIPIDEMIA: ICD-10-CM

## 2020-01-16 DIAGNOSIS — E66.09 CLASS 1 OBESITY DUE TO EXCESS CALORIES WITHOUT SERIOUS COMORBIDITY WITH BODY MASS INDEX (BMI) OF 30.0 TO 30.9 IN ADULT: ICD-10-CM

## 2020-01-16 DIAGNOSIS — E29.1 TESTICULAR HYPOGONADISM: ICD-10-CM

## 2020-01-16 DIAGNOSIS — E55.9 VITAMIN D DEFICIENCY: ICD-10-CM

## 2020-01-16 DIAGNOSIS — R00.1 BRADYCARDIA: ICD-10-CM

## 2020-01-16 DIAGNOSIS — F41.1 GENERALIZED ANXIETY DISORDER: Primary | ICD-10-CM

## 2020-01-16 DIAGNOSIS — Z28.21 VACCINATION REFUSED BY PATIENT: ICD-10-CM

## 2020-01-16 DIAGNOSIS — R73.01 ELEVATED FASTING GLUCOSE: ICD-10-CM

## 2020-01-16 PROBLEM — R53.83 FATIGUE: Status: RESOLVED | Noted: 2017-06-12 | Resolved: 2020-01-16

## 2020-01-16 PROBLEM — G47.19 EXCESSIVE DAYTIME SLEEPINESS: Status: RESOLVED | Noted: 2017-06-12 | Resolved: 2020-01-16

## 2020-01-16 PROCEDURE — 3008F BODY MASS INDEX DOCD: CPT | Performed by: NURSE PRACTITIONER

## 2020-01-16 PROCEDURE — 93000 ELECTROCARDIOGRAM COMPLETE: CPT | Performed by: NURSE PRACTITIONER

## 2020-01-16 PROCEDURE — 99214 OFFICE O/P EST MOD 30 MIN: CPT | Performed by: NURSE PRACTITIONER

## 2020-01-16 NOTE — ASSESSMENT & PLAN NOTE
Will recheck lipid panel, patient aware of goal results  Patient eating healthy diet and exercising frequently

## 2020-01-16 NOTE — PATIENT INSTRUCTIONS
Continue with current medications  Follow up in 6 months  Please follow up with cardiology regarding low heart rate/ abnormal EKG  Complete blood work prior to follow up, this is fasting  Please call the office if you are experiencing any worsening of symptoms or no symptom improvement  Cholesterol and Your Health   AMBULATORY CARE:   Cholesterol  is a waxy, fat-like substance  Cholesterol is made by your body, but also comes from certain foods you eat  Your body uses cholesterol to make hormones and new cells  Your body also uses cholesterol to protect nerves  Cholesterol comes from foods such as meat and dairy products  Your total cholesterol level is made up by LDL cholesterol, HDL cholesterol, and triglycerides:  · LDL cholesterol  is called bad cholesterol  because it forms plaque in your arteries  As plaque builds up, your arteries become narrow, and less blood flows through  When plaque decreases blood flow to your heart, you may have chest pain  If plaque completely blocks an artery that bring blood to your heart, you may have a heart attack  Plaque can break off and form blood clots  Blood clots may block arteries in your brain and cause a stroke  · HDL cholesterol  is called good cholesterol  because it helps remove LDL cholesterol from your arteries  It does this by attaching to LDL cholesterol and carrying it to your liver  Your liver breaks down LDL cholesterol so your body can get rid of it  High levels of HDL cholesterol can help prevent a heart attack and stroke  Low levels of HDL cholesterol can increase your risk for heart disease, heart attack, and stroke  · Triglycerides  are a type of fat that store energy from foods you eat  High levels of triglycerides also cause plaque buildup  This can increase your risk for a heart attack or stroke  If your triglyceride level is high, your LDL cholesterol level may also be high    How food affects your cholesterol levels:   · Unhealthy fats  increase LDL cholesterol and triglyceride levels in your blood  They are found in foods high in cholesterol, saturated fat, and trans fat:     ¨ Cholesterol  is found in eggs, dairy, and meat  ¨ Saturated fat  is found in butter, cheese, ice cream, whole milk, and coconut oil  Saturated fat is also found in meat, such as sausage, hot dogs, and bologna  ¨ Trans fat  is found in liquid oils and is used in fried and baked foods  Foods that contain trans fats include chips, crackers, muffins, sweet rolls, microwave popcorn, and cookies  · Healthy fats,  also called unsaturated fats, help lower LDL cholesterol and triglyceride levels  Healthy fats include the following:     ¨ Monounsaturated fats  are found in foods such as olive oil, canola oil, avocado, nuts, and olives  ¨ Polyunsaturated fats,  such as omega 3 fats, are found in fish, such as salmon, trout, and tuna  They can also be found in plant foods such as flaxseed, walnuts, and soybeans  Other things that affect your cholesterol levels:   · Smoking cigarettes    · Being overweight or obese     · Drinking large amounts of alcohol    · Not enough exercise or no exercise    · Certain genes passed from your parents to you  What you need to know about having your cholesterol levels checked: Adults 21to 39years of age should have their cholesterol levels checked every 4 to 6 years  Adults 45 years and older should have their cholesterol checked every 1 to 2 years  You may need your cholesterol checked more often, or at a younger age, if you have risk factors for heart disease  You may also need to have your cholesterol checked more often if you have other health conditions, such as diabetes  Blood tests are used to check cholesterol levels  Blood tests measure your levels of triglycerides, LDL cholesterol, and HDL cholesterol  Cholesterol level goals:   Your cholesterol level goal may depend on your risk for heart disease  It may also depend on your age and other health conditions  Ask your healthcare provider if the following goals are right for you:  · Your total cholesterol level  should be less than 200 mg/dL  This number may also depend on your HDL and LDL cholesterol goals  · Your LDL cholesterol level  should be less than 130 mg/dL  · Your HDL cholesterol level  should be 60 mg/dL or higher  · Your triglyceride level  should be less than 150 mg/dL  Treatment for high cholesterol:  Treatment for high cholesterol will also decrease your risk of heart disease, heart attack, and stroke  Treatment may include any of the following:  · Medicines  may be given to lower your LDL cholesterol, triglyceride levels, or total cholesterol level  You may need medicines to lower your cholesterol if any of the following is true:     ¨ You have a history of stroke, TIA, unstable angina, or a heart attack    ¨ Your LDL cholesterol level is 190 mg/dL or higher    ¨ You are age 36to 76years of age, have diabetes, and your LDL cholesterol is 70 mg/dL or higher    ¨ You are age 36to 76years of age, have risk factors for heart disease, and your LDL cholesterol is 70 mg/dL or higher    · Lifestyle changes  include changes to your diet, exercise, weight loss, and quitting smoking  It also includes decreasing the amount of alcohol you drink  · Supplements  include fish oil, red yeast rice, and garlic  Fish oil may help lower your triglyceride and LDL cholesterol levels  It may also increase your HDL cholesterol level  Red yeast rice may help decrease your total cholesterol level and LDL cholesterol level  Garlic may help lower your total cholesterol level  Do not take these supplements without talking to your healthcare provider  Nutrition to help lower your cholesterol levels:  A registered dietitian can help you create a healthy eating plan   Read food labels and choose foods low in saturated fat, trans fats, and cholesterol  · Decrease the total amount of fat you eat  Choose lean meats, fat-free or 1% fat milk, and low-fat dairy products, such as yogurt and cheese  Try to limit or avoid red meats  Limit or do not eat fried foods or baked goods such as cookies  · Replace unhealthy fats with healthy fats  Cook foods in olive oil or canola oil  Choose soft margarines that are low in saturated fat and trans fat  Seeds, nuts, and avocados are other examples of healthy fats  · Eat foods with omega-3 fats  Examples include salmon, tuna, mackerel, walnuts, and flaxseed  Eat fish 2 times per week  Children and pregnant women should not eat fish that have high levels of mercury, such as shark, swordfish, and ras mackerel  · Increase the amount of plant-based foods you eat  Plant-based foods are low in cholesterol and fat  Eating more of these foods may help lower your cholesterol and help you lose weight  Examples of plant-based foods includes fruits, vegetables, legumes, and whole grains  Replace milk that contains dairy with almond, soy, or coconut milk  Eat beans and foods with soy for protein instead of meat  Ask your healthcare provider or dietitian for more information on plant-based foods  · Increase the amount of fiber you eat  High-fiber foods can help lower your LDL cholesterol  You should eat between 20 and 30 grams of fiber each day  Eat at least 5 servings of fruits and vegetables each day  Other examples of high-fiber foods include whole-grain or whole-wheat breads, pastas, or cereals, and brown rice  Eat 3 ounces of whole-grain foods each day  Increase fiber slowly  You may have abdominal discomfort, bloating, and gas if you add fiber to your diet too quickly  Lifestyle changes you can make to help lower your cholesterol levels:   · Maintain a healthy weight  Ask your healthcare provider how much you should weigh  Ask him or her to help you create a weight loss plan if you are overweight   Weight loss can decrease your total cholesterol and triglyceride levels  · Exercise regularly  Exercise can help lower your total cholesterol level and maintain a healthy weight  Exercise can also help increase your HDL cholesterol level  Work with your healthcare provider to create an exercise program that is right for you  Get at least 30 minutes of moderate exercise most days of the week  Examples of exercise include brisk walking, swimming, or biking  · Do not smoke  Nicotine and other chemicals in cigarettes and cigars can damage your lungs, heart, and blood vessels  They can also raise your triglyceride levels  Ask your healthcare provider for information if you currently smoke and need help to quit  E-cigarettes or smokeless tobacco still contain nicotine  Talk to your healthcare provider before you use these products  · Limit or do not drink alcohol  Alcohol can increase your triglyceride levels  Ask your healthcare provider if it is safe for you to drink alcohol  Also ask how much is safe for you to drink each day  © 2017 [facility] Information is for End User's use only and may not be sold, redistributed or otherwise used for commercial purposes  All illustrations and images included in CareNotes® are the copyrighted property of [employer]  or [employer]  The above information is an  only  It is not intended as medical advice for individual conditions or treatments  Talk to your doctor, nurse or pharmacist before following any medical regimen to see if it is safe and effective for you  Heart Healthy Diet   AMBULATORY CARE:   A heart healthy diet  is an eating plan low in total fat, unhealthy fats, and sodium (salt)  A heart healthy diet helps decrease your risk for heart disease and stroke  Limit the amount of fat you eat to 25% to 35% of your total daily calories  Limit sodium to less than 2,300 mg each day     Healthy fats:  Healthy fats can help improve cholesterol levels  The risk for heart disease is decreased when cholesterol levels are normal  Choose healthy fats, such as the following:  · Unsaturated fat  is found in foods such as soybean, canola, olive, corn, and safflower oils  It is also found in soft tub margarine that is made with liquid vegetable oil  · Omega-3 fat  is found in certain fish, such as salmon, tuna, and trout, and in walnuts and flaxseed  Unhealthy fats:  Unhealthy fats can cause unhealthy cholesterol levels in your blood and increase your risk of heart disease  Limit unhealthy fats, such as the following:  · Cholesterol  is found in animal foods, such as eggs and lobster, and in dairy products made from whole milk  Limit cholesterol to less than 300 milligrams (mg) each day  You may need to limit cholesterol to 200 mg each day if you have heart disease  · Saturated fat  is found in meats, such as selby and hamburger  It is also found in chicken or turkey skin, whole milk, and butter  Limit saturated fat to less than 7% of your total daily calories  Limit saturated fat to less than 6% if you have heart disease or are at increased risk for it  · Trans fat  is found in packaged foods, such as potato chips and cookies  It is also in hard margarine, some fried foods, and shortening  Avoid trans fats as much as possible    Heart healthy foods and drinks to include:  Ask your dietitian or healthcare provider how many servings to have from each of the following food groups:  · Grains:      ¨ Whole-wheat breads, cereals, and pastas, and brown rice    ¨ Low-fat, low-sodium crackers and chips    · Vegetables:      ¨ Broccoli, green beans, green peas, and spinach    ¨ Collards, kale, and lima beans    ¨ Carrots, sweet potatoes, tomatoes, and peppers    ¨ Canned vegetables with no salt added    · Fruits:      ¨ Bananas, peaches, pears, and pineapple    ¨ Grapes, raisins, and dates    ¨ Oranges, tangerines, grapefruit, orange juice, and grapefruit juice    ¨ Apricots, mangoes, melons, and papaya    ¨ Raspberries and strawberries    ¨ Canned fruit with no added sugar    · Low-fat dairy products:      ¨ Nonfat (skim) milk, 1% milk, and low-fat almond, cashew, or soy milks fortified with calcium    ¨ Low-fat cheese, regular or frozen yogurt, and cottage cheese    · Meats and proteins , such as lean cuts of beef and pork (loin, leg, round), skinless chicken and turkey, legumes, soy products, egg whites, and nuts  Foods and drinks to limit or avoid:  Ask your dietitian or healthcare provider about these and other foods that are high in unhealthy fat, sodium, and sugar:  · Snack or packaged foods , such as frozen dinners, cookies, macaroni and cheese, and cereals with more than 300 mg of sodium per serving    · Canned or dry mixes  for cakes, soups, sauces, or gravies    · Vegetables with added sodium , such as instant potatoes, vegetables with added sauces, or regular canned vegetables    · Other foods high in sodium , such as ketchup, barbecue sauce, salad dressing, pickles, olives, soy sauce, and miso    · High-fat dairy foods  such as whole or 2% milk, cream cheese, or sour cream, and cheeses     · High-fat protein foods  such as high-fat cuts of beef (T-bone steaks, ribs), chicken or turkey with skin, and organ meats, such as liver    · Cured or smoked meats , such as hot dogs, selby, and sausage    · Unhealthy fats and oils , such as butter, stick margarine, shortening, and cooking oils such as coconut or palm oil    · Food and drinks high in sugar , such as soft drinks (soda), sports drinks, sweetened tea, candy, cake, cookies, pies, and doughnuts  Other diet guidelines to follow:   · Eat more foods containing omega-3 fats  Eat fish high in omega-3 fats at least 2 times a week  · Limit alcohol  Too much alcohol can damage your heart and raise your blood pressure  Women should limit alcohol to 1 drink a day   Men should limit alcohol to 2 drinks a day  A drink of alcohol is 12 ounces of beer, 5 ounces of wine, or 1½ ounces of liquor  · Choose low-sodium foods  High-sodium foods can lead to high blood pressure  Add little or no salt to food you prepare  Use herbs and spices in place of salt  · Eat more fiber  to help lower cholesterol levels  Eat at least 5 servings of fruits and vegetables each day  Eat 3 ounces of whole-grain foods each day  Legumes (beans) are also a good source of fiber  Lifestyle guidelines:   · Do not smoke  Nicotine and other chemicals in cigarettes and cigars can cause lung and heart damage  Ask your healthcare provider for information if you currently smoke and need help to quit  E-cigarettes or smokeless tobacco still contain nicotine  Talk to your healthcare provider before you use these products  · Exercise regularly  to help you maintain a healthy weight and improve your blood pressure and cholesterol levels  Ask your healthcare provider about the best exercise plan for you  Do not start an exercise program without asking your healthcare provider  Follow up with your healthcare provider as directed:  Write down your questions so you remember to ask them during your visits  © 2017 [facility] Information is for End User's use only and may not be sold, redistributed or otherwise used for commercial purposes  All illustrations and images included in CareNotes® are the copyrighted property of [employer]  or [employer]  The above information is an  only  It is not intended as medical advice for individual conditions or treatments  Talk to your doctor, nurse or pharmacist before following any medical regimen to see if it is safe and effective for you

## 2020-01-16 NOTE — PROGRESS NOTES
Assessment/Plan:    Generalized anxiety disorder  Stable on buspar 10 mg TID  Testicular hypogonadism  Stable, being managed by endocrinology  On Androgel  Class 1 obesity due to excess calories without serious comorbidity with body mass index (BMI) of 31 0 to 31 9 in adult  Patient is in good shape and exercises frequently and practices healthy dietary habits  Mixed hyperlipidemia  Will recheck lipid panel, patient aware of goal results  Patient eating healthy diet and exercising frequently  Bradycardia  Patient asymptomatic, he is very active, may be more athlete HR, but EKG shows left axis deviation, in the presence of bradycardia I recommended cardiology evaluation, patient agreeable, referral placed  Labs ordered for 6 month f/u physical    Patient verbalizes understand and agrees with treatment plan  Diagnoses and all orders for this visit:    Generalized anxiety disorder  -     TSH, 3rd generation with Free T4 reflex; Future  -     CBC and differential; Future    Testicular hypogonadism  -     CBC and differential; Future  -     Testosterone, free, total; Future    Mixed hyperlipidemia  -     Lipid panel; Future  -     TSH, 3rd generation with Free T4 reflex; Future  -     Comprehensive metabolic panel; Future    Vitamin D deficiency  -     Vitamin D 25 hydroxy; Future    Elevated fasting glucose  -     Hemoglobin A1C; Future    Class 1 obesity due to excess calories without serious comorbidity with body mass index (BMI) of 30 0 to 30 9 in adult  -     Lipid panel; Future  -     TSH, 3rd generation with Free T4 reflex; Future  -     Hemoglobin A1C; Future  -     Comprehensive metabolic panel; Future  -     Testosterone, free, total; Future    Bradycardia  -     Ambulatory referral to Cardiology;  Future  -     POCT ECG    Vaccination refused by patient  -     influenza vaccine, [id number], quadrivalent, 0 5 mL, preservative-free, for adult and pediatric patients 6 mos+ ([employer]iley FUNES 100, FLULAVAL, FLUZONE)    Left axis deviation  -     Ambulatory referral to Cardiology; Future                Subjective:        Patient ID: Ana Lilia Harvey is a 37 y o  male  Chief Complaint   Patient presents with    Follow-up     6 months follow up and review lab work        Here for 6 month follow up  Hasn't done the recheck of lipid panel  Overall doing well without any complaints or concerns  Diet is good, makes all food  Nothing processed  Doesn't eat candy/drink soda  Exercising 4 days a week  The following portions of the patient's history were reviewed and updated as appropriate: allergies, current medications, past family history, past social history and problem list     Review of Systems   Constitutional: Negative for chills and fever  Eyes: Negative for discharge  Respiratory: Negative for shortness of breath  Cardiovascular: Negative for chest pain  Gastrointestinal: Negative for constipation and diarrhea  Genitourinary: Negative for difficulty urinating  Musculoskeletal: Negative for joint swelling  Skin: Negative for rash  Neurological: Negative for headaches  Hematological: Negative for adenopathy  Psychiatric/Behavioral: The patient is not nervous/anxious  Objective:  /60   Pulse (!) 44   Temp 98 °F (36 7 °C) (Temporal)   Resp 16   Ht 5' 8" (1 727 m)   Wt 93 7 kg (206 lb 9 6 oz)   SpO2 97%   BMI 31 41 kg/m²      Physical Exam   Constitutional: He is oriented to person, place, and time  He appears well-developed  No distress  obese   HENT:   Head: Normocephalic and atraumatic  Right Ear: External ear normal    Left Ear: External ear normal    Eyes: Conjunctivae and lids are normal  Right eye exhibits no discharge  Left eye exhibits no discharge  Neck: Normal range of motion  Neck supple  Cardiovascular: Normal rate and regular rhythm  No murmur heard  Pulmonary/Chest: Effort normal and breath sounds normal  No respiratory distress   He has no wheezes  Musculoskeletal: He exhibits no deformity  Neurological: He is alert and oriented to person, place, and time  Coordination normal    Skin: Skin is warm and dry  He is not diaphoretic  Psychiatric: He has a normal mood and affect  His speech is normal and behavior is normal  Judgment and thought content normal  Cognition and memory are normal    Nursing note and vitals reviewed  BMI Counseling: Body mass index is 31 41 kg/m²  The BMI is above normal  Nutrition recommendations include decreasing portion sizes, encouraging healthy choices of fruits and vegetables and reducing intake of cholesterol  Exercise recommendations include moderate physical activity 150 minutes/week and exercising 3-5 times per week  No pharmacotherapy was ordered             Current Outpatient Medications:     busPIRone (BUSPAR) 10 mg tablet, Take 1 tablet (10 mg total) by mouth 3 (three) times a day, Disp: 90 tablet, Rfl: 5    ergocalciferol (VITAMIN D2) 50,000 units, Take 1 capsule (50,000 Units total) by mouth once a week, Disp: 4 capsule, Rfl: 0    testosterone (ANDROGEL) 1 62 % TD gel pump, Apply 2 actuation (40 5 mg total) topically every morning, Disp: 180 actuation, Rfl: 0  No Known Allergies

## 2020-01-16 NOTE — ASSESSMENT & PLAN NOTE
Patient asymptomatic, he is very active, may be more athlete HR, but EKG shows left axis deviation, in the presence of bradycardia I recommended cardiology evaluation, patient agreeable, referral placed

## 2020-01-19 DIAGNOSIS — E55.9 VITAMIN D DEFICIENCY: ICD-10-CM

## 2020-01-19 DIAGNOSIS — E29.1 TESTICULAR HYPOGONADISM: ICD-10-CM

## 2020-01-19 RX ORDER — TESTOSTERONE 16.2 MG/G
40.5 GEL TRANSDERMAL EVERY MORNING
Qty: 180 ACTUATION | Refills: 0 | Status: CANCELLED | OUTPATIENT
Start: 2020-01-19 | End: 2020-07-17

## 2020-01-20 DIAGNOSIS — E29.1 TESTICULAR HYPOGONADISM: ICD-10-CM

## 2020-01-20 RX ORDER — ERGOCALCIFEROL 1.25 MG/1
50000 CAPSULE ORAL WEEKLY
Qty: 4 CAPSULE | Refills: 0 | Status: SHIPPED | OUTPATIENT
Start: 2020-01-20 | End: 2020-02-12

## 2020-01-20 RX ORDER — ERGOCALCIFEROL 1.25 MG/1
CAPSULE ORAL
Qty: 4 CAPSULE | Refills: 0 | OUTPATIENT
Start: 2020-01-20

## 2020-01-21 RX ORDER — TESTOSTERONE 16.2 MG/G
40.5 GEL TRANSDERMAL EVERY MORNING
Qty: 180 ACTUATION | Refills: 0 | Status: SHIPPED | OUTPATIENT
Start: 2020-01-21 | End: 2020-01-24 | Stop reason: SDUPTHER

## 2020-01-24 ENCOUNTER — OFFICE VISIT (OUTPATIENT)
Dept: ENDOCRINOLOGY | Facility: CLINIC | Age: 40
End: 2020-01-24
Payer: COMMERCIAL

## 2020-01-24 ENCOUNTER — TELEPHONE (OUTPATIENT)
Dept: ENDOCRINOLOGY | Facility: CLINIC | Age: 40
End: 2020-01-24

## 2020-01-24 VITALS
HEART RATE: 48 BPM | WEIGHT: 204.3 LBS | SYSTOLIC BLOOD PRESSURE: 120 MMHG | DIASTOLIC BLOOD PRESSURE: 78 MMHG | BODY MASS INDEX: 31.06 KG/M2

## 2020-01-24 DIAGNOSIS — E55.9 VITAMIN D DEFICIENCY: Primary | ICD-10-CM

## 2020-01-24 DIAGNOSIS — E78.2 MIXED HYPERLIPIDEMIA: ICD-10-CM

## 2020-01-24 DIAGNOSIS — E29.1 TESTICULAR HYPOGONADISM: ICD-10-CM

## 2020-01-24 DIAGNOSIS — E66.09 CLASS 1 OBESITY DUE TO EXCESS CALORIES WITHOUT SERIOUS COMORBIDITY WITH BODY MASS INDEX (BMI) OF 31.0 TO 31.9 IN ADULT: ICD-10-CM

## 2020-01-24 PROCEDURE — 99213 OFFICE O/P EST LOW 20 MIN: CPT | Performed by: INTERNAL MEDICINE

## 2020-01-24 RX ORDER — TESTOSTERONE 16.2 MG/G
40.5 GEL TRANSDERMAL EVERY MORNING
Qty: 180 ACTUATION | Refills: 0 | Status: SHIPPED | OUTPATIENT
Start: 2020-01-24 | End: 2020-12-03 | Stop reason: SDUPTHER

## 2020-01-24 NOTE — TELEPHONE ENCOUNTER
[facility] calling about rx testosterone    they have a question about the quanity sent    Please call back at [phone]  Ask for [facility], thank you

## 2020-01-24 NOTE — ASSESSMENT & PLAN NOTE
He is doing well on Androgel  Check PSA, total and free testosterone  He is going to talk to his primary care physician to see if they feel comfortable managing his hypogonadism  If his PCP is agreeable to managing the hypogonadism, he will not need to see us in the office unless he develops symptoms or there is changes in his laboratory testing

## 2020-01-24 NOTE — PROGRESS NOTES
Assessment/Plan:    Testicular hypogonadism  He is doing well on Androgel  Check PSA, total and free testosterone  He is going to talk to his primary care physician to see if they feel comfortable managing his hypogonadism  If his PCP is agreeable to managing the hypogonadism, he will not need to see us in the office unless he develops symptoms or there is changes in his laboratory testing  Vitamin D deficiency  Continue supplementation  Class 1 obesity due to excess calories without serious comorbidity with body mass index (BMI) of 31 0 to 31 9 in adult  This is stable  I suspect his BMI is high due to muscle mass  Mixed hyperlipidemia  He is working with his primary care physician to improve this  Diagnoses and all orders for this visit:    Vitamin D deficiency    Testicular hypogonadism  -     testosterone (ANDROGEL) 1 62 % TD gel pump; Apply 2 actuation (40 5 mg total) topically every morning  -     PSA, total screen Lab Collect; Future  -     Testosterone, free, total Lab Collect; Future    Class 1 obesity due to excess calories without serious comorbidity with body mass index (BMI) of 31 0 to 31 9 in adult    Mixed hyperlipidemia          Subjective:      Patient ID: Betsy Bella is a 37 y o  male  57-year-old male with hypogonadism for which he is on testosterone supplementation  He states that he has more motivated now with the testosterone  He has more energy as well  For vitamin-D deficiency, he is on supplementation  Obesity is stable  I suspect this is due to muscle mass rather than actual fat deposition  He is working with his primary care physician to improve his lipid panel        The following portions of the patient's history were reviewed and updated as appropriate: allergies, current medications, past family history, past medical history, past social history, past surgical history and problem list     Review of Systems   Constitutional: Negative for chills and fever    Respiratory: Negative for shortness of breath  Cardiovascular: Negative for chest pain  Gastrointestinal: Negative for constipation, diarrhea, nausea and vomiting  All other systems reviewed and are negative  Objective:      /78   Pulse (!) 48   Wt 92 7 kg (204 lb 4 8 oz)   BMI 31 06 kg/m²          Physical Exam   Constitutional: He is oriented to person, place, and time  He appears well-developed and well-nourished  No distress  HENT:   Head: Normocephalic and atraumatic  Mouth/Throat: Oropharynx is clear and moist and mucous membranes are normal  No oropharyngeal exudate  Eyes: Conjunctivae, EOM and lids are normal  Right eye exhibits no discharge  Left eye exhibits no discharge  No scleral icterus  Neck: Neck supple  No thyromegaly present  Cardiovascular: Normal rate, regular rhythm and normal heart sounds  Exam reveals no gallop and no friction rub  No murmur heard  Pulmonary/Chest: Effort normal and breath sounds normal  No respiratory distress  He has no wheezes  Abdominal: Soft  Bowel sounds are normal  He exhibits no distension  There is no tenderness  Musculoskeletal: Normal range of motion  He exhibits no edema, tenderness or deformity  Lymphadenopathy:        Head (right side): No occipital adenopathy present  Head (left side): No occipital adenopathy present  Right: No supraclavicular adenopathy present  Left: No supraclavicular adenopathy present  Neurological: He is alert and oriented to person, place, and time  No cranial nerve deficit  Coordination normal    Skin: Skin is warm and intact  No rash noted  He is not diaphoretic  No erythema  Psychiatric: He has a normal mood and affect  Vitals reviewed

## 2020-02-12 DIAGNOSIS — E55.9 VITAMIN D DEFICIENCY: ICD-10-CM

## 2020-02-12 RX ORDER — ERGOCALCIFEROL 1.25 MG/1
CAPSULE ORAL
Qty: 4 CAPSULE | Refills: 0 | Status: SHIPPED | OUTPATIENT
Start: 2020-02-12 | End: 2020-03-11 | Stop reason: SDUPTHER

## 2020-02-12 RX ORDER — ERGOCALCIFEROL 1.25 MG/1
50000 CAPSULE ORAL WEEKLY
Qty: 4 CAPSULE | Refills: 0 | Status: SHIPPED | OUTPATIENT
Start: 2020-02-12 | End: 2020-03-11

## 2020-03-11 DIAGNOSIS — E55.9 VITAMIN D DEFICIENCY: ICD-10-CM

## 2020-03-11 DIAGNOSIS — E29.1 TESTICULAR HYPOGONADISM: ICD-10-CM

## 2020-03-11 RX ORDER — TESTOSTERONE 16.2 MG/G
40.5 GEL TRANSDERMAL EVERY MORNING
Qty: 180 ACTUATION | Refills: 0 | Status: CANCELLED | OUTPATIENT
Start: 2020-03-11 | End: 2020-09-07

## 2020-03-11 RX ORDER — ERGOCALCIFEROL 1.25 MG/1
50000 CAPSULE ORAL WEEKLY
Qty: 4 CAPSULE | Refills: 1 | Status: SHIPPED | OUTPATIENT
Start: 2020-03-11 | End: 2020-05-18

## 2020-03-12 RX ORDER — ERGOCALCIFEROL 1.25 MG/1
50000 CAPSULE ORAL WEEKLY
Qty: 4 CAPSULE | Refills: 5 | Status: SHIPPED | OUTPATIENT
Start: 2020-03-12 | End: 2020-05-18 | Stop reason: SDUPTHER

## 2020-03-30 DIAGNOSIS — F41.1 GAD (GENERALIZED ANXIETY DISORDER): ICD-10-CM

## 2020-03-30 RX ORDER — BUSPIRONE HYDROCHLORIDE 10 MG/1
TABLET ORAL
Qty: 90 TABLET | Refills: 5 | Status: SHIPPED | OUTPATIENT
Start: 2020-03-30 | End: 2020-12-21 | Stop reason: SDUPTHER

## 2020-03-30 RX ORDER — BUSPIRONE HYDROCHLORIDE 10 MG/1
10 TABLET ORAL 3 TIMES DAILY
Qty: 90 TABLET | Refills: 0 | OUTPATIENT
Start: 2020-03-30

## 2020-05-18 DIAGNOSIS — E55.9 VITAMIN D DEFICIENCY: ICD-10-CM

## 2020-05-18 RX ORDER — ERGOCALCIFEROL 1.25 MG/1
50000 CAPSULE ORAL WEEKLY
Qty: 12 CAPSULE | Refills: 0 | Status: SHIPPED | OUTPATIENT
Start: 2020-05-18 | End: 2020-12-21 | Stop reason: SDUPTHER

## 2020-07-16 ENCOUNTER — OFFICE VISIT (OUTPATIENT)
Dept: FAMILY MEDICINE CLINIC | Facility: CLINIC | Age: 40
End: 2020-07-16
Payer: COMMERCIAL

## 2020-07-16 VITALS
TEMPERATURE: 97.9 F | HEART RATE: 54 BPM | RESPIRATION RATE: 18 BRPM | OXYGEN SATURATION: 97 % | SYSTOLIC BLOOD PRESSURE: 112 MMHG | DIASTOLIC BLOOD PRESSURE: 70 MMHG | BODY MASS INDEX: 25.61 KG/M2 | HEIGHT: 68 IN | WEIGHT: 169 LBS

## 2020-07-16 DIAGNOSIS — F98.5 ADULT STUTTERING: ICD-10-CM

## 2020-07-16 DIAGNOSIS — Z00.00 HEALTH MAINTENANCE EXAMINATION: Primary | ICD-10-CM

## 2020-07-16 DIAGNOSIS — F51.04 PSYCHOPHYSIOLOGICAL INSOMNIA: ICD-10-CM

## 2020-07-16 DIAGNOSIS — E29.1 TESTICULAR HYPOGONADISM: ICD-10-CM

## 2020-07-16 DIAGNOSIS — F41.1 GENERALIZED ANXIETY DISORDER: ICD-10-CM

## 2020-07-16 DIAGNOSIS — E55.9 VITAMIN D DEFICIENCY: ICD-10-CM

## 2020-07-16 DIAGNOSIS — E78.2 MIXED HYPERLIPIDEMIA: ICD-10-CM

## 2020-07-16 PROBLEM — S05.02XA INJURY OF CONJUNCTIVA AND CORNEAL ABRASION OF LEFT EYE WITHOUT FOREIGN BODY: Status: RESOLVED | Noted: 2019-03-29 | Resolved: 2020-07-16

## 2020-07-16 PROCEDURE — 99396 PREV VISIT EST AGE 40-64: CPT | Performed by: NURSE PRACTITIONER

## 2020-07-16 RX ORDER — TRAZODONE HYDROCHLORIDE 50 MG/1
TABLET ORAL
Qty: 28 TABLET | Refills: 0 | Status: SHIPPED | OUTPATIENT
Start: 2020-07-16 | End: 2020-08-17 | Stop reason: SDUPTHER

## 2020-07-16 NOTE — ASSESSMENT & PLAN NOTE
Will update  Labs ordered previously the patient has diet completed these     Lab Results   Component Value Date    CHOLESTEROL 213 (H) 08/22/2019    CHOLESTEROL 207 (H) 08/05/2019    CHOLESTEROL 233 (H) 06/12/2017     Lab Results   Component Value Date    HDL 51 08/22/2019    HDL 54 08/05/2019    HDL 63 (H) 06/12/2017     Lab Results   Component Value Date    TRIG 77 08/22/2019    TRIG 71 08/05/2019    TRIG 115 06/12/2017     No results found for: [place]  Lab Results   Component Value Date    LDLCALC 144 (H) 08/22/2019

## 2020-07-16 NOTE — ASSESSMENT & PLAN NOTE
Discussed with patient that I am okay monitoring this since he recently has constipation with endocrinology  Did discuss monitoring guidelines as well as monitoring CBCs liver enzymes and prostate antigens  Patient does have the PSA ordered that has not yet been completed  Patient encouraged to complete blood work

## 2020-07-16 NOTE — PROGRESS NOTES
Assessment/Plan:    Health Maintenance  Lifestyle Advice: follow a low fat, low cholesterol diet and reduce exposure to stress  Diet: well balanced diet  Exercise: frequently  Dental: regular dental visits and brushes teeth twice daily  Vision: goes for regular eye exams, most recent eye exam <1 year and wears glasses  Preventative Health: Male Preventative: Exercises regularly    Mixed hyperlipidemia  Will update  Labs ordered previously the patient has diet completed these  Lab Results   Component Value Date    CHOLESTEROL 213 (H) 08/22/2019    CHOLESTEROL 207 (H) 08/05/2019    CHOLESTEROL 233 (H) 06/12/2017     Lab Results   Component Value Date    HDL 51 08/22/2019    HDL 54 08/05/2019    HDL 63 (H) 06/12/2017     Lab Results   Component Value Date    TRIG 77 08/22/2019    TRIG 71 08/05/2019    TRIG 115 06/12/2017     No results found for: Palmer  Lab Results   Component Value Date    LDLCALC 144 (H) 08/22/2019         BMI 25 0-25 9,adult  Body mass index skewed as patient does have muscular physique and is very active  Vitamin D deficiency  Labs are ordered to be completed  Patient currently on 86687 units weekly  Generalized anxiety disorder  Patient states he feels like this is very stable on the BuSpar 10 mg 3 times a day  Adult stuttering  Patient notes improvement with BuSpar  Testicular hypogonadism  Discussed with patient that I am okay monitoring this since he recently has constipation with endocrinology  Did discuss monitoring guidelines as well as monitoring CBCs liver enzymes and prostate antigens  Patient does have the PSA ordered that has not yet been completed  Patient encouraged to complete blood work  Psychophysiological insomnia  Discussed potential options with patient  Patient wishes to pursue trazodone  Will start with 25 mg at bedtime for the 1st 5 days if well tolerated increase to 50 mg at bedtime  This medication may cause drowsiness   Do not drive on this medication  Do not drink alcohol while taking this medication  Do not mix with other medications that may cause drowsiness  Handout provided on medication  Discussed medication administration as well as side effects  He is to call or message me with update in 1 month         Diagnoses and all orders for this visit:    Health maintenance examination    Generalized anxiety disorder    Vitamin D deficiency    Testicular hypogonadism    BMI 25 0-25 9,adult    Mixed hyperlipidemia    Psychophysiological insomnia  -     traZODone (DESYREL) 50 mg tablet; Take 0 5 tablets (25 mg total) by mouth daily at bedtime for 5 days, THEN 1 tablet (50 mg total) daily at bedtime for 25 days  Adult stuttering                  Subjective:      Patient ID: Chasity Kaplan is a 37 y o  male  Here for physical  Overall doing well  Patient states he does have some issues with sleep  He states over the past few months his sleep cycle has not been great  He has no trouble falling asleep but has a hard time staying asleep  Patient did have a sleep study done 2019 which did not show any signs of obstructive sleep apnea  The following portions of the patient's history were reviewed and updated as appropriate: allergies, current medications, past family history, past medical history, past social history, past surgical history and problem list     Review of Systems   Constitutional: Negative for chills and fever  Eyes: Negative for discharge  Respiratory: Negative for shortness of breath  Cardiovascular: Negative for chest pain  Gastrointestinal: Negative for constipation and diarrhea  Genitourinary: Negative for difficulty urinating  Musculoskeletal: Negative for joint swelling  Skin: Negative for rash  Neurological: Negative for headaches  Hematological: Negative for adenopathy  Psychiatric/Behavioral: Positive for sleep disturbance  The patient is not nervous/anxious            Objective:    /70 (BP Location: Left arm, Patient Position: Sitting, Cuff Size: Large)   Pulse (!) 54   Temp 97 9 °F (36 6 °C) (Temporal)   Resp 18   Ht 5' 8" (1 727 m)   Wt 76 7 kg (169 lb)   SpO2 97%   BMI 25 70 kg/m²          Physical Exam   Constitutional: He is oriented to person, place, and time  He appears well-developed  No distress  overweight   HENT:   Head: Normocephalic and atraumatic  Right Ear: External ear normal    Left Ear: Tympanic membrane, external ear and ear canal normal    Cerumen impaction right ear  Eyes: Pupils are equal, round, and reactive to light  Conjunctivae, EOM and lids are normal  Right eye exhibits no discharge  Left eye exhibits no discharge  Neck: Normal range of motion  Neck supple  Cardiovascular: Normal rate and regular rhythm  No murmur heard  Pulmonary/Chest: Effort normal and breath sounds normal  No respiratory distress  He has no wheezes  Abdominal: Soft  Bowel sounds are normal  There is no tenderness  Musculoskeletal: Normal range of motion  He exhibits no deformity  Neurological: He is alert and oriented to person, place, and time  Coordination normal    Skin: Skin is warm and dry  He is not diaphoretic  Psychiatric: He has a normal mood and affect  His speech is normal and behavior is normal  Judgment and thought content normal  Cognition and memory are normal    Nursing note and vitals reviewed  Patient has no known allergies  Jaxson Tavarez had no medications administered during this visit    Health Maintenance   Topic Date Due    HIV Screening  11/08/1993    Influenza Vaccine  07/01/2020    Annual Physical  07/16/2020    Depression Screening PHQ  01/16/2021    BMI: Followup Plan  01/16/2021    DTaP,Tdap,and Td Vaccines (1 - Tdap) 07/16/2020 (Originally 11/8/1989)    BMI: Adult  07/16/2021    Pneumococcal Vaccine: 65+ Years (1 of 2 - PCV13) 11/08/2043    Pneumococcal Vaccine: Pediatrics (0 to 5 Years) and At-Risk Patients (6 to 59 Years)  Aged Out    HIB Vaccine  Aged Out    Hepatitis B Vaccine  Aged Out    IPV Vaccine  Aged Out    Hepatitis A Vaccine  Aged Out    Meningococcal ACWY Vaccine  Aged Out    HPV Vaccine  Aged Out      Social History     Socioeconomic History    Marital status: /Civil Union     Spouse name: Not on file    Number of children: Not on file    Years of education: Not on file    Highest education level: Not on file   Occupational History    Occupation: laboror   Social Needs    Financial resource strain: Not on file    Food insecurity:     Worry: Not on file     Inability: Not on file   [employer] needs:     Medical: Not on file     Non-medical: Not on file   Tobacco Use    Smoking status: Never Smoker    Smokeless tobacco: Never Used   Substance and Sexual Activity    Alcohol use: Yes     Comment: social    Drug use: No    Sexual activity: Not on file   Lifestyle    Physical activity:     Days per week: Not on file     Minutes per session: Not on file    Stress: Not on file   Relationships    Social connections:     Talks on phone: Not on file     Gets together: Not on file     Attends Gnosticist service: Not on file     Active member of club or organization: Not on file     Attends meetings of clubs or organizations: Not on file     Relationship status: Not on file    Intimate partner violence:     Fear of current or ex partner: Not on file     Emotionally abused: Not on file     Physically abused: Not on file     Forced sexual activity: Not on file   Other Topics Concern    Not on file   Social History Narrative        Regular dental care    Good dental hygiene    Caffeine use      Family History   Problem Relation Age of Onset    Multiple sclerosis Mother       Past Medical History:   Diagnosis Date    Hypogonadism in male       has a past surgical history that includes Tooth extraction and [place] tooth extraction     Patient Active Problem List    Diagnosis Date Noted    Psychophysiological insomnia 07/16/2020    Bradycardia 01/16/2020    Mixed hyperlipidemia 01/16/2020    Elevated fasting glucose 01/16/2020    BMI 25 0-25 9,adult 07/16/2019    Vitamin D deficiency 06/12/2017    Dermatitis 10/18/2016    Testicular hypogonadism 08/04/2016    Adult stuttering 07/25/2016    Generalized anxiety disorder 07/25/2016    Abnormal results of liver function studies 01/31/2007       Current Outpatient Medications:     busPIRone (BUSPAR) 10 mg tablet, take 1 tablet by mouth three times a day, Disp: 90 tablet, Rfl: 5    ergocalciferol (VITAMIN D2) 50,000 units, Take 1 capsule (50,000 Units total) by mouth once a week, Disp: 12 capsule, Rfl: 0    testosterone (ANDROGEL) 1 62 % TD gel pump, Apply 2 actuation (40 5 mg total) topically every morning, Disp: 180 actuation, Rfl: 0    traZODone (DESYREL) 50 mg tablet, Take 0 5 tablets (25 mg total) by mouth daily at bedtime for 5 days, THEN 1 tablet (50 mg total) daily at bedtime for 25 days  , Disp: 28 tablet, Rfl: 0

## 2020-07-16 NOTE — ASSESSMENT & PLAN NOTE
Discussed potential options with patient  Patient wishes to pursue trazodone  Will start with 25 mg at bedtime for the 1st 5 days if well tolerated increase to 50 mg at bedtime  This medication may cause drowsiness  Do not drive on this medication  Do not drink alcohol while taking this medication  Do not mix with other medications that may cause drowsiness  Handout provided on medication  Discussed medication administration as well as side effects   He is to call or message me with update in 1 month

## 2020-07-16 NOTE — PATIENT INSTRUCTIONS
Continue with current medication  Complete blood work, this is fasting  Continue with healthy lifestyle  Start Trazodone for sleep, start 1/2 tablet at bedtime for about 5 days, if well tolerated increase to one tablet at bedtime  This may take a few weeks to work  This medication may cause drowsiness  Do not drive on this medication  Do not drink alcohol while taking this medication  Do not mix with other medications that may cause drowsiness  Please call the office if you are experiencing any worsening of symptoms or no symptom improvement  Wellness Visit for Adults   AMBULATORY CARE:   A wellness visit  is when you see your healthcare provider to get screened for health problems  You can also get advice on how to stay healthy  Write down your questions so you remember to ask them  Ask your healthcare provider how often you should have a wellness visit  What happens at a wellness visit:  Your healthcare provider will ask about your health, and your family history of health problems  This includes high blood pressure, heart disease, and cancer  He or she will ask if you have symptoms that concern you, if you smoke, and about your mood  You may also be asked about your intake of medicines, supplements, food, and alcohol  Any of the following may be done:  · Your weight  will be checked  Your height may also be checked so your body mass index (BMI) can be calculated  Your BMI shows if you are at a healthy weight  · Your blood pressure  and heart rate will be checked  Your temperature may also be checked  · Blood and urine tests  may be done  Blood tests may be done to check your cholesterol levels  Abnormal cholesterol levels increase your risk for heart disease and stroke  You may also need a blood or urine test to check for diabetes if you are at increased risk  Urine tests may be done to look for signs of an infection or kidney disease       · A physical exam  includes checking your heartbeat and lungs with a stethoscope  Your healthcare provider may also check your skin to look for sun damage  · Screening tests  may be recommended  A screening test is done to check for diseases that may not cause symptoms  The screening tests you may need depend on your age, gender, family history, and lifestyle habits  For example, colorectal screening may be recommended if you are 48years old or older  Screening tests you need if you are a woman:   · A Pap smear  is used to screen for cervical cancer  Pap smears are usually done every 3 to 5 years depending on your age  You may need them more often if you have had abnormal Pap smear test results in the past  Ask your healthcare provider how often you should have a Pap smear  · A mammogram  is an x-ray of your breasts to screen for breast cancer  Experts recommend mammograms every 2 years starting at age 48 years  You may need a mammogram at age 52 years or younger if you have an increased risk for breast cancer  Talk to your healthcare provider about when you should start having mammograms and how often you need them  Vaccines you may need:   · Get an influenza vaccine  every year  The influenza vaccine protects you from the flu  Several types of viruses cause the flu  The viruses change over time, so new vaccines are made each year  · Get a tetanus-diphtheria (Td) booster vaccine  every 10 years  This vaccine protects you against tetanus and diphtheria  Tetanus is a severe infection that may cause painful muscle spasms and lockjaw  Diphtheria is a severe bacterial infection that causes a thick covering in the back of your mouth and throat  · Get a human papillomavirus (HPV) vaccine  if you are female and aged 23 to 32 or male 23 to 24 and never received it  This vaccine protects you from HPV infection  HPV is the most common infection spread by sexual contact  HPV may also cause vaginal, penile, and anal cancers      · Get a pneumococcal vaccine  if you are aged 72 years or older  The pneumococcal vaccine is an injection given to protect you from pneumococcal disease  Pneumococcal disease is an infection caused by pneumococcal bacteria  The infection may cause pneumonia, meningitis, or an ear infection  · Get a shingles vaccine  if you are aged 61 or older, even if you have had shingles before  The shingles vaccine is an injection to protect you from the varicella-zoster virus  This is the same virus that causes chickenpox  Shingles is a painful rash that develops in people who had chickenpox or have been exposed to the virus  How to eat healthy:  My Plate is a model for planning healthy meals  It shows the types and amounts of foods that should go on your plate  Fruits and vegetables make up about half of your plate, and grains and protein make up the other half  A serving of dairy is included on the side of your plate  The amount of calories and serving sizes you need depends on your age, gender, weight, and height  Examples of healthy foods are listed below:  · Eat a variety of vegetables  such as dark green, red, and orange vegetables  You can also include canned vegetables low in sodium (salt) and frozen vegetables without added butter or sauces  · Eat a variety of fresh fruits , canned fruit in 100% juice, frozen fruit, and dried fruit  · Include whole grains  At least half of the grains you eat should be whole grains  Examples include whole-wheat bread, wheat pasta, brown rice, and whole-grain cereals such as oatmeal     · Eat a variety of protein foods such as seafood (fish and shellfish), lean meat, and poultry without skin (turkey and chicken)  Examples of lean meats include pork leg, shoulder, or tenderloin, and beef round, sirloin, tenderloin, and extra lean ground beef  Other protein foods include eggs and egg substitutes, beans, peas, soy products, nuts, and seeds       · Choose low-fat dairy products such as skim or 1% milk or low-fat yogurt, cheese, and cottage cheese  · Limit unhealthy fats  such as butter, hard margarine, and shortening  Exercise:  Exercise at least 30 minutes per day on most days of the week  Some examples of exercise include walking, biking, dancing, and swimming  You can also fit in more physical activity by taking the stairs instead of the elevator or parking farther away from stores  Include muscle strengthening activities 2 days each week  Regular exercise provides many health benefits  It helps you manage your weight, and decreases your risk for type 2 diabetes, heart disease, stroke, and high blood pressure  Exercise can also help improve your mood  Ask your healthcare provider about the best exercise plan for you  General health and safety guidelines:   · Do not smoke  Nicotine and other chemicals in cigarettes and cigars can cause lung damage  Ask your healthcare provider for information if you currently smoke and need help to quit  E-cigarettes or smokeless tobacco still contain nicotine  Talk to your healthcare provider before you use these products  · Limit alcohol  A drink of alcohol is 12 ounces of beer, 5 ounces of wine, or 1½ ounces of liquor  · Lose weight, if needed  Being overweight increases your risk of certain health conditions  These include heart disease, high blood pressure, type 2 diabetes, and certain types of cancer  · Protect your skin  Do not sunbathe or use tanning beds  Use sunscreen with a SPF 15 or higher  Apply sunscreen at least 15 minutes before you go outside  Reapply sunscreen every 2 hours  Wear protective clothing, hats, and sunglasses when you are outside  · Drive safely  Always wear your seatbelt  Make sure everyone in your car wears a seatbelt  A seatbelt can save your life if you are in an accident  Do not use your cell phone when you are driving  This could distract you and cause an accident   Pull over if you need to make a call or send a text message  · Practice safe sex  Use latex condoms if are sexually active and have more than one partner  Your healthcare provider may recommend screening tests for sexually transmitted infections (STIs)  · Wear helmets, lifejackets, and protective gear  Always wear a helmet when you ride a bike or motorcycle, go skiing, or play sports that could cause a head injury  Wear protective equipment when you play sports  Wear a lifejacket when you are on a boat or doing water sports  © 2017 [facility] Information is for End User's use only and may not be sold, redistributed or otherwise used for commercial purposes  All illustrations and images included in CareNotes® are the copyrighted property of A D A M , Inc  or Mio Mathis  The above information is an  only  It is not intended as medical advice for individual conditions or treatments  Talk to your doctor, nurse or pharmacist before following any medical regimen to see if it is safe and effective for you  Trazodone (By mouth)   Trazodone (TRAZ-oh-done)  Treats depression  Brand Name(s): Oleptro   There may be other brand names for this medicine  When This Medicine Should Not Be Used: This medicine is not right for everyone  Do not use it if you had an allergic reaction to trazodone  How to Use This Medicine:   Tablet, Long Acting Tablet  · Take your medicine as directed  Your dose may need to be changed several times to find what works best for you  · Regular tablet: Take it with or shortly after a meal or light snack  · Extended-release tablet: Take it at the same time each day, preferably at bedtime, without food  · The tablet can be swallowed whole, or you may break the tablet in half along the score line  Do not break the tablet unless your doctor tells you to  Do not crush or chew the tablet  · This medicine should come with a Medication Guide   Ask your pharmacist for a copy if you do not have one  · Missed dose: Take a dose as soon as you remember  If it is almost time for your next dose, wait until then and take a regular dose  Do not take extra medicine to make up for a missed dose  · Store the medicine in a closed container at room temperature, away from heat, moisture, and direct light  Drugs and Foods to Avoid:   Ask your doctor or pharmacist before using any other medicine, including over-the-counter medicines, vitamins, and herbal products  · Do not use trazodone if you currently take an MAO inhibitor (MAOI) or have used an MAOI in the past 14 days  · Tell your doctor if you also use any of the following:  ¨ Carbamazepine, digoxin, phenytoin, indinavir, ritonavir, buspirone, fentanyl, lithium, tryptophan, Isela's wort, tramadol  ¨ Medicine to treat a fungal infection (such as itraconazole, ketoconazole), a diuretic (water pill), blood pressure medicine, an NSAID pain or arthritis medicine (such as aspirin, celecoxib, diclofenac, ibuprofen, naproxen), a blood thinner (such as warfarin), other medicine for depression, or triptan medicine to treat migraine headaches  · Do not drink alcohol while you are using this medicine  · Tell your doctor if you use anything else that makes you sleepy  Some examples are allergy medicine, narcotic pain medicine, and alcohol  Warnings While Using This Medicine:   · Tell your doctor if you are pregnant or breastfeeding, or if you have kidney disease, liver disease, bleeding problems, glaucoma, heart disease, heart rhythm problems, or low blood pressure  Tell your doctor if you recently had a heart attack  · For some children, teenagers, and young adults, this medicine may increase mental or emotional problems  This may lead to thoughts of suicide and violence  Talk with your doctor right away if you have any thoughts or behavior changes that concern you   Tell your doctor if you or anyone in your family has a history of bipolar disorder or suicide attempts  · This medicine may cause the following problems:  ¨ Serotonin syndrome (more likely when used with certain other medicines)  ¨ Heart rhythm problems (QT prolongation)  ¨ Low sodium levels  ¨ Higher risk of bleeding  · Do not stop using this medicine suddenly  Your doctor will need to slowly decrease your dose before you stop it completely  · This medicine may make you dizzy or drowsy  Do not drive or do anything that could be dangerous until you know how this medicine affects you  Stand or sit up slowly if you are dizzy  · Tell any doctor or dentist who treats you that you are using this medicine  You may need to stop using this medicine several days before you have surgery or medical tests  · Your doctor will check your progress and the effects of this medicine at regular visits  Keep all appointments  · Keep all medicine out of the reach of children  Never share your medicine with anyone    Possible Side Effects While Using This Medicine:   Call your doctor right away if you notice any of these side effects:  · Allergic reaction: Itching or hives, swelling in your face or hands, swelling or tingling in your mouth or throat, chest tightness, trouble breathing  · Anxiety, restlessness, fever, sweating, muscle spasms, nausea, vomiting, diarrhea, seeing or hearing things that are not there  · Confusion, weakness, muscle twitching  · Fast, pounding, or uneven heartbeat  · Lightheadedness, dizziness, fainting  · Painful, prolonged erection of your penis  · Sudden increase in energy, feeling irritable, trouble sleeping  · Thoughts of hurting yourself or others, unusual behavior  · Unusual bleeding or bruising  If you notice these less serious side effects, talk with your doctor:   · Constipation, mild nausea  · Dry mouth  · Eye pain, vision changes, seeing halos around lights  · Headache  · Sleepiness or unusual drowsiness  If you notice other side effects that you think are caused by this medicine, tell your doctor  Call your doctor for medical advice about side effects  You may report side effects to FDA at 6-657-FDA-1465  © 2017 [address] [employer] Information is for End User's use only and may not be sold, redistributed or otherwise used for commercial purposes  The above information is an  only  It is not intended as medical advice for individual conditions or treatments  Talk to your doctor, nurse or pharmacist before following any medical regimen to see if it is safe and effective for you

## 2020-08-17 DIAGNOSIS — F51.04 PSYCHOPHYSIOLOGICAL INSOMNIA: ICD-10-CM

## 2020-08-18 RX ORDER — TRAZODONE HYDROCHLORIDE 50 MG/1
TABLET ORAL
Qty: 30 TABLET | Refills: 0 | Status: SHIPPED | OUTPATIENT
Start: 2020-08-18 | End: 2020-09-30 | Stop reason: SDUPTHER

## 2020-08-18 RX ORDER — TRAZODONE HYDROCHLORIDE 50 MG/1
TABLET ORAL
Qty: 28 TABLET | Refills: 0 | OUTPATIENT
Start: 2020-08-18

## 2020-09-30 DIAGNOSIS — F51.04 PSYCHOPHYSIOLOGICAL INSOMNIA: ICD-10-CM

## 2020-10-01 RX ORDER — TRAZODONE HYDROCHLORIDE 50 MG/1
TABLET ORAL
Qty: 90 TABLET | Refills: 0 | Status: SHIPPED | OUTPATIENT
Start: 2020-10-01 | End: 2021-02-09 | Stop reason: SDUPTHER

## 2020-10-01 RX ORDER — TRAZODONE HYDROCHLORIDE 50 MG/1
TABLET ORAL
Qty: 30 TABLET | Refills: 0 | OUTPATIENT
Start: 2020-10-01

## 2020-10-02 DIAGNOSIS — F51.04 PSYCHOPHYSIOLOGICAL INSOMNIA: ICD-10-CM

## 2020-10-02 RX ORDER — TRAZODONE HYDROCHLORIDE 50 MG/1
TABLET ORAL
Qty: 90 TABLET | Refills: 0 | OUTPATIENT
Start: 2020-10-02

## 2020-11-20 DIAGNOSIS — E29.1 TESTICULAR HYPOGONADISM: ICD-10-CM

## 2020-11-20 RX ORDER — TESTOSTERONE 16.2 MG/G
40.5 GEL TRANSDERMAL EVERY MORNING
Qty: 225 G | OUTPATIENT
Start: 2020-11-20 | End: 2021-05-19

## 2020-11-20 RX ORDER — TESTOSTERONE 16.2 MG/G
40.5 GEL TRANSDERMAL EVERY MORNING
Qty: 180 ACTUATION | Refills: 0 | OUTPATIENT
Start: 2020-11-20 | End: 2021-05-19

## 2020-11-30 ENCOUNTER — LAB (OUTPATIENT)
Dept: LAB | Facility: CLINIC | Age: 40
End: 2020-11-30
Payer: COMMERCIAL

## 2020-11-30 DIAGNOSIS — E29.1 TESTICULAR HYPOGONADISM: ICD-10-CM

## 2020-11-30 DIAGNOSIS — E78.2 MIXED HYPERLIPIDEMIA: ICD-10-CM

## 2020-11-30 DIAGNOSIS — E55.9 VITAMIN D DEFICIENCY: ICD-10-CM

## 2020-11-30 DIAGNOSIS — F41.1 GENERALIZED ANXIETY DISORDER: ICD-10-CM

## 2020-11-30 DIAGNOSIS — R73.01 ELEVATED FASTING GLUCOSE: ICD-10-CM

## 2020-11-30 DIAGNOSIS — E66.09 CLASS 1 OBESITY DUE TO EXCESS CALORIES WITHOUT SERIOUS COMORBIDITY WITH BODY MASS INDEX (BMI) OF 30.0 TO 30.9 IN ADULT: ICD-10-CM

## 2020-11-30 LAB
25(OH)D3 SERPL-MCNC: 72.8 NG/ML (ref 30–100)
ALBUMIN SERPL BCP-MCNC: 4.1 G/DL (ref 3.5–5)
ALP SERPL-CCNC: 59 U/L (ref 46–116)
ALT SERPL W P-5'-P-CCNC: 26 U/L (ref 12–78)
ANION GAP SERPL CALCULATED.3IONS-SCNC: 2 MMOL/L (ref 4–13)
AST SERPL W P-5'-P-CCNC: 29 U/L (ref 5–45)
BASOPHILS # BLD AUTO: 0.02 THOUSANDS/ΜL (ref 0–0.1)
BASOPHILS NFR BLD AUTO: 0 % (ref 0–1)
BILIRUB SERPL-MCNC: 1.32 MG/DL (ref 0.2–1)
BUN SERPL-MCNC: 14 MG/DL (ref 5–25)
CALCIUM SERPL-MCNC: 9.1 MG/DL (ref 8.3–10.1)
CHLORIDE SERPL-SCNC: 107 MMOL/L (ref 100–108)
CHOLEST SERPL-MCNC: 244 MG/DL (ref 50–200)
CO2 SERPL-SCNC: 31 MMOL/L (ref 21–32)
CREAT SERPL-MCNC: 1.03 MG/DL (ref 0.6–1.3)
EOSINOPHIL # BLD AUTO: 0.11 THOUSAND/ΜL (ref 0–0.61)
EOSINOPHIL NFR BLD AUTO: 2 % (ref 0–6)
ERYTHROCYTE [DISTWIDTH] IN BLOOD BY AUTOMATED COUNT: 12.7 % (ref 11.6–15.1)
EST. AVERAGE GLUCOSE BLD GHB EST-MCNC: 108 MG/DL
GFR SERPL CREATININE-BSD FRML MDRD: 89 ML/MIN/1.73SQ M
GLUCOSE P FAST SERPL-MCNC: 97 MG/DL (ref 65–99)
HBA1C MFR BLD: 5.4 %
HCT VFR BLD AUTO: 46.1 % (ref 36.5–49.3)
HDLC SERPL-MCNC: 67 MG/DL
HGB BLD-MCNC: 15.1 G/DL (ref 12–17)
IMM GRANULOCYTES # BLD AUTO: 0.03 THOUSAND/UL (ref 0–0.2)
IMM GRANULOCYTES NFR BLD AUTO: 0 % (ref 0–2)
LDLC SERPL CALC-MCNC: 159 MG/DL (ref 0–100)
LYMPHOCYTES # BLD AUTO: 1.95 THOUSANDS/ΜL (ref 0.6–4.47)
LYMPHOCYTES NFR BLD AUTO: 27 % (ref 14–44)
MCH RBC QN AUTO: 29.5 PG (ref 26.8–34.3)
MCHC RBC AUTO-ENTMCNC: 32.8 G/DL (ref 31.4–37.4)
MCV RBC AUTO: 90 FL (ref 82–98)
MONOCYTES # BLD AUTO: 0.62 THOUSAND/ΜL (ref 0.17–1.22)
MONOCYTES NFR BLD AUTO: 9 % (ref 4–12)
NEUTROPHILS # BLD AUTO: 4.59 THOUSANDS/ΜL (ref 1.85–7.62)
NEUTS SEG NFR BLD AUTO: 62 % (ref 43–75)
NONHDLC SERPL-MCNC: 177 MG/DL
NRBC BLD AUTO-RTO: 0 /100 WBCS
PLATELET # BLD AUTO: 201 THOUSANDS/UL (ref 149–390)
PMV BLD AUTO: 10.7 FL (ref 8.9–12.7)
POTASSIUM SERPL-SCNC: 4.4 MMOL/L (ref 3.5–5.3)
PROT SERPL-MCNC: 7.2 G/DL (ref 6.4–8.2)
PSA SERPL-MCNC: 0.8 NG/ML (ref 0–4)
RBC # BLD AUTO: 5.12 MILLION/UL (ref 3.88–5.62)
SODIUM SERPL-SCNC: 140 MMOL/L (ref 136–145)
TRIGL SERPL-MCNC: 90 MG/DL
TSH SERPL DL<=0.05 MIU/L-ACNC: 1.89 UIU/ML (ref 0.36–3.74)
WBC # BLD AUTO: 7.32 THOUSAND/UL (ref 4.31–10.16)

## 2020-11-30 PROCEDURE — G0103 PSA SCREENING: HCPCS

## 2020-11-30 PROCEDURE — 84443 ASSAY THYROID STIM HORMONE: CPT

## 2020-11-30 PROCEDURE — 80053 COMPREHEN METABOLIC PANEL: CPT

## 2020-11-30 PROCEDURE — 83036 HEMOGLOBIN GLYCOSYLATED A1C: CPT

## 2020-11-30 PROCEDURE — 84403 ASSAY OF TOTAL TESTOSTERONE: CPT

## 2020-11-30 PROCEDURE — 82306 VITAMIN D 25 HYDROXY: CPT

## 2020-11-30 PROCEDURE — 36415 COLL VENOUS BLD VENIPUNCTURE: CPT

## 2020-11-30 PROCEDURE — 85025 COMPLETE CBC W/AUTO DIFF WBC: CPT

## 2020-11-30 PROCEDURE — 80061 LIPID PANEL: CPT

## 2020-11-30 PROCEDURE — 84402 ASSAY OF FREE TESTOSTERONE: CPT

## 2020-12-01 ENCOUNTER — TELEPHONE (OUTPATIENT)
Dept: ENDOCRINOLOGY | Facility: CLINIC | Age: 40
End: 2020-12-01

## 2020-12-01 LAB
TESTOST FREE SERPL-MCNC: 12.1 PG/ML (ref 6.8–21.5)
TESTOST SERPL-MCNC: 591 NG/DL (ref 264–916)

## 2020-12-02 ENCOUNTER — TELEPHONE (OUTPATIENT)
Dept: ENDOCRINOLOGY | Facility: CLINIC | Age: 40
End: 2020-12-02

## 2020-12-03 DIAGNOSIS — E29.1 TESTICULAR HYPOGONADISM: ICD-10-CM

## 2020-12-04 RX ORDER — TESTOSTERONE 16.2 MG/G
40.5 GEL TRANSDERMAL EVERY MORNING
Qty: 180 ACTUATION | Refills: 0 | Status: SHIPPED | OUTPATIENT
Start: 2020-12-04 | End: 2021-03-08 | Stop reason: SDUPTHER

## 2020-12-07 ENCOUNTER — TELEPHONE (OUTPATIENT)
Dept: ENDOCRINOLOGY | Facility: CLINIC | Age: 40
End: 2020-12-07

## 2020-12-21 DIAGNOSIS — F41.1 GAD (GENERALIZED ANXIETY DISORDER): ICD-10-CM

## 2020-12-21 DIAGNOSIS — E55.9 VITAMIN D DEFICIENCY: ICD-10-CM

## 2020-12-21 RX ORDER — ERGOCALCIFEROL 1.25 MG/1
50000 CAPSULE ORAL WEEKLY
Qty: 12 CAPSULE | Refills: 0 | Status: SHIPPED | OUTPATIENT
Start: 2020-12-21 | End: 2021-02-09 | Stop reason: SDUPTHER

## 2020-12-21 RX ORDER — BUSPIRONE HYDROCHLORIDE 10 MG/1
10 TABLET ORAL 3 TIMES DAILY
Qty: 270 TABLET | Refills: 2 | Status: SHIPPED | OUTPATIENT
Start: 2020-12-21 | End: 2021-02-09 | Stop reason: SDUPTHER

## 2020-12-21 RX ORDER — BUSPIRONE HYDROCHLORIDE 10 MG/1
TABLET ORAL
Qty: 90 TABLET | Refills: 5 | OUTPATIENT
Start: 2020-12-21

## 2021-01-14 ENCOUNTER — TELEPHONE (OUTPATIENT)
Dept: FAMILY MEDICINE CLINIC | Facility: CLINIC | Age: 41
End: 2021-01-14

## 2021-01-14 NOTE — TELEPHONE ENCOUNTER
Typically if we are prescribing medications we recommend every 6 months  If that's not something he's able to do, yearly would be okay as he's been stable on his medications for a while

## 2021-01-14 NOTE — TELEPHONE ENCOUNTER
Patient called cancelling his 6 month appt for today, his wants to know if he really needs a 6 month follow up due to him having his lab work done and everything was okay with it  If he does need to keep the appointment he will reschedule  Please advise patient at [phone]

## 2021-01-17 ENCOUNTER — PATIENT MESSAGE (OUTPATIENT)
Dept: FAMILY MEDICINE CLINIC | Facility: CLINIC | Age: 41
End: 2021-01-17

## 2021-01-18 ENCOUNTER — TELEPHONE (OUTPATIENT)
Dept: FAMILY MEDICINE CLINIC | Facility: CLINIC | Age: 41
End: 2021-01-18

## 2021-01-18 NOTE — TELEPHONE ENCOUNTER
Pt called and spoke to Kofi Salas and states he does not want a virtual visit  Pt states he was tested for COVID yesterday and sent a message through [employer] asking what he should do next I advised he contact the office and schedule a virtual visit to discuss what his concerns are and or recommendations for COVID-19  Pt refused

## 2021-01-19 ENCOUNTER — TELEMEDICINE (OUTPATIENT)
Dept: FAMILY MEDICINE CLINIC | Facility: CLINIC | Age: 41
End: 2021-01-19
Payer: COMMERCIAL

## 2021-01-19 ENCOUNTER — PATIENT MESSAGE (OUTPATIENT)
Dept: FAMILY MEDICINE CLINIC | Facility: CLINIC | Age: 41
End: 2021-01-19

## 2021-01-19 DIAGNOSIS — U07.1 COVID-19 VIRUS INFECTION: Primary | ICD-10-CM

## 2021-01-19 PROCEDURE — 99213 OFFICE O/P EST LOW 20 MIN: CPT | Performed by: NURSE PRACTITIONER

## 2021-01-19 NOTE — PROGRESS NOTES
COVID-19 Virtual Visit     Assessment/Plan:    Problem List Items Addressed This Visit     None      Visit Diagnoses     COVID-19 virus infection    -  Primary         Disposition:     I recommended continued isolation until at least 24 hours have passed since recovery defined as resolution of fever without the use of fever-reducing medications AND improvement in COVID symptoms AND 10 days have passed since onset of symptoms (or 10 days have passed since date of first positive viral diagnostic test for asymptomatic patients)  Earliest d/c isolation date 1/27/21  F/u Friday  Advised on OTC medications PRN, vitamin c, d, and zinc  Call for any changes especially respiratory symptoms  Monitor temperature closely  Hydration/rest/ plain mucinex  Please call the office if you are experiencing any worsening of symptoms or no symptom improvement  I have spent 20 minutes directly with the patient  Greater than 50% of this time was spent in counseling/coordination of care regarding: prognosis, risks and benefits of treatment options, instructions for management, patient and family education, importance of treatment compliance, risk factor reductions and impressions  [facility]    Provider located at [address] [place] [phone]    Recent Visits  Date Type Provider Dept   01/18/21 Telephone [address] [facility]   01/14/21 Telephone [address] [facility] recent visits within past 7 days and meeting all other requirements     Today's Visits  Date Type Provider Dept   01/19/21 Telemedicine RL Pichardo  [address] [facility]   Showing today's visits and meeting all other requirements     Future Appointments  No visits were found meeting these conditions     Showing future appointments within next 150 days and meeting all other requirements      This virtual check-in was done via [employer] and patient was informed that this is a secure, HIPAA-compliant platform  He agrees to proceed  Patient agrees to participate in a virtual check in via telephone or video visit instead of presenting to the office to address urgent/immediate medical needs  Patient is aware this is a billable service  After connecting through [facility], the patient was identified by name and date of birth  Lucretia Sanchez was informed that this was a telemedicine visit and that the exam was being conducted confidentially over secure lines  My office door was closed  No one else was in the room  Lucretia Sanchez acknowledged consent and understanding of privacy and security of the telemedicine visit  I informed the patient that I have reviewed his record in Epic and presented the opportunity for him to ask any questions regarding the visit today  The patient agreed to participate  Subjective:   Lucretia Sanchez is a 41 y o  male who has been screened for COVID-19  Symptom change since last report: unchanged  Patient's symptoms include fever, chills, fatigue, nasal congestion, rhinorrhea, sore throat, cough (dry mostly, maybe 30% of the time it's productive), diarrhea, myalgias and headache  Patient denies malaise, anosmia, loss of taste, shortness of breath, chest tightness, abdominal pain, nausea and vomiting  J Carlos Menchaca has been staying home and has isolated themselves in his home  He is taking care to not share personal items and is cleaning all surfaces that are touched often, like counters, tabletops, and doorknobs using household cleaning sprays or wipes  He is wearing a mask when he leaves his room  Date of symptom onset: 1/16/2021  Date of positive COVID-19 PCR: 1/17/2021    Was tested 1/17 and came back today  Sunday morning he had a fever of 102 9, then he had a lot of weakness and muscle aches  He was beginning to have chest congestion   He had the testing done at [facility] and now today his temperature is down to 100  6  his chest congestion a little bit worse at this time  Thursday - Saturday was fasting and only eating once per day  On Saturday he took his daughter for bike ride and he ran, that afternoon he felt really weak but attributed it to the running/fasting  At this time he's been taking dayquil and nyquil  No results found for: Faviola Mast, [address] [facility], [place] [address]  Past Medical History:   Diagnosis Date    Hypogonadism in male      Past Surgical History:   Procedure Laterality Date    TOOTH EXTRACTION      WISDOM TOOTH EXTRACTION       Current Outpatient Medications   Medication Sig Dispense Refill    busPIRone (BUSPAR) 10 mg tablet Take 1 tablet (10 mg total) by mouth 3 (three) times a day 270 tablet 2    ergocalciferol (VITAMIN D2) 50,000 units Take 1 capsule (50,000 Units total) by mouth once a week 12 capsule 0    testosterone (ANDROGEL) 1 62 % TD gel pump Apply 2 actuation (40 5 mg total) topically every morning 180 actuation 0    traZODone (DESYREL) 50 mg tablet Take 1 tablet at bedtime 90 tablet 0     No current facility-administered medications for this visit  No Known Allergies    Review of Systems   Constitutional: Positive for chills, fatigue and fever  HENT: Positive for congestion, rhinorrhea and sore throat  Respiratory: Positive for cough (dry mostly, maybe 30% of the time it's productive)  Negative for chest tightness and shortness of breath  Gastrointestinal: Positive for diarrhea  Negative for abdominal pain, nausea and vomiting  Musculoskeletal: Positive for myalgias  Neurological: Positive for headaches  Objective: There were no vitals filed for this visit  Physical Exam  Constitutional:       General: He is not in acute distress  Appearance: Normal appearance  He is not ill-appearing, toxic-appearing or diaphoretic  HENT:      Head: Normocephalic and atraumatic  Eyes:      General: No scleral icterus    Pulmonary:      Effort: Pulmonary effort is normal  No respiratory distress  Skin:     Coloration: Skin is not pale  Neurological:      Mental Status: He is alert and oriented to person, place, and time  Psychiatric:         Mood and Affect: Mood normal        VIRTUAL VISIT DISCLAIMER    Mina Munozon acknowledges that he has consented to an online visit or consultation  He understands that the online visit is based solely on information provided by him, and that, in the absence of a face-to-face physical evaluation by the physician, the diagnosis he receives is both limited and provisional in terms of accuracy and completeness  This is not intended to replace a full medical face-to-face evaluation by the physician  Mina Vyas understands and accepts these terms

## 2021-01-22 ENCOUNTER — TELEPHONE (OUTPATIENT)
Dept: FAMILY MEDICINE CLINIC | Facility: CLINIC | Age: 41
End: 2021-01-22

## 2021-01-22 ENCOUNTER — TELEMEDICINE (OUTPATIENT)
Dept: FAMILY MEDICINE CLINIC | Facility: CLINIC | Age: 41
End: 2021-01-22
Payer: COMMERCIAL

## 2021-01-22 DIAGNOSIS — U07.1 COVID-19 VIRUS INFECTION: Primary | ICD-10-CM

## 2021-01-22 PROCEDURE — 99213 OFFICE O/P EST LOW 20 MIN: CPT | Performed by: NURSE PRACTITIONER

## 2021-01-22 NOTE — PROGRESS NOTES
COVID-19 Virtual Visit     Assessment/Plan:    Problem List Items Addressed This Visit     None         Disposition:     I recommended continued isolation until at least 24 hours have passed since recovery defined as resolution of fever without the use of fever-reducing medications AND improvement in COVID symptoms AND 10 days have passed since onset of symptoms (or 10 days have passed since date of first positive viral diagnostic test for asymptomatic patients)  Earliest d/c isolation date 1/27/21    Advised on OTC medications PRN, vitamin c, d, and zinc  Call for any changes especially respiratory symptoms  Monitor temperature closely  Hydration/rest/ plain mucinex  Encouraged electrolytes  Recheck 1/26  Please call the office if you are experiencing any worsening of symptoms or no symptom improvement  I have spent 20 minutes directly with the patient  Greater than 50% of this time was spent in counseling/coordination of care regarding: prognosis, risks and benefits of treatment options, instructions for management, patient and family education, importance of treatment compliance, risk factor reductions and impressions  Encounter provider Vonnie Westfall, 10 [facility]    Provider located at [address] [place], [place] [id number]    Recent Visits  Date Type Provider Dept   01/19/21 Jv 20, Metsa 68   01/18/21 Telephone [address] Total [address] [facility] recent visits within past 7 days and meeting all other requirements     Future Appointments  No visits were found meeting these conditions  Showing future appointments within next 150 days and meeting all other requirements      This virtual check-in was done via [employer] and patient was informed that this is a secure, HIPAA-compliant platform  He agrees to proceed      Patient agrees to participate in a virtual check in via telephone or video visit instead of presenting to the office to address urgent/immediate medical needs  Patient is aware this is a billable service  After connecting through [facility], the patient was identified by name and date of birth  Moose Fay was informed that this was a telemedicine visit and that the exam was being conducted confidentially over secure lines  My office door was closed  No one else was in the room  Moose Fay acknowledged consent and understanding of privacy and security of the telemedicine visit  I informed the patient that I have reviewed his record in Epic and presented the opportunity for him to ask any questions regarding the visit today  The patient agreed to participate  Subjective:   Moose Fay is a 41 y o  male who has been screened for COVID-19  Symptom change since last report: improving  Patient's symptoms include fatigue, nasal congestion (improved), cough (dry mostly, maybe 30% of the time it's productive) and myalgias  Patient denies fever, chills, malaise, rhinorrhea, sore throat, anosmia, loss of taste, shortness of breath, chest tightness, abdominal pain, nausea, vomiting, diarrhea and headaches  Noa Chauhan has been staying home and has isolated themselves in his home  He is taking care to not share personal items and is cleaning all surfaces that are touched often, like counters, tabletops, and doorknobs using household cleaning sprays or wipes  He is wearing a mask when he leaves his room  Date of symptom onset: 1/16/2021  Date of positive COVID-19 PCR: 1/17/2021    He's been staying hydrated  Having muscle pains  He has over the counter medication (dayquil/nyquil) to use PRN  He felt nyquil made him more drowsy so he stopped that       No results found for: Martha Diallo, [address] [facility], [place] [address]  Past Medical History:   Diagnosis Date    Hypogonadism in male      Past Surgical History:   Procedure Laterality Date    TOOTH EXTRACTION      WISDOM TOOTH EXTRACTION Current Outpatient Medications   Medication Sig Dispense Refill    busPIRone (BUSPAR) 10 mg tablet Take 1 tablet (10 mg total) by mouth 3 (three) times a day 270 tablet 2    ergocalciferol (VITAMIN D2) 50,000 units Take 1 capsule (50,000 Units total) by mouth once a week 12 capsule 0    testosterone (ANDROGEL) 1 62 % TD gel pump Apply 2 actuation (40 5 mg total) topically every morning 180 actuation 0    traZODone (DESYREL) 50 mg tablet Take 1 tablet at bedtime 90 tablet 0     No current facility-administered medications for this visit  No Known Allergies    Review of Systems   Constitutional: Positive for fatigue  Negative for chills and fever  HENT: Positive for congestion (improved)  Negative for rhinorrhea and sore throat  Respiratory: Positive for cough (dry mostly, maybe 30% of the time it's productive)  Negative for chest tightness and shortness of breath  Gastrointestinal: Negative for abdominal pain, diarrhea, nausea and vomiting  Musculoskeletal: Positive for myalgias  Neurological: Negative for headaches  Objective: There were no vitals filed for this visit  Physical Exam  Constitutional:       General: He is not in acute distress  Appearance: Normal appearance  He is not ill-appearing, toxic-appearing or diaphoretic  HENT:      Head: Normocephalic and atraumatic  Eyes:      General: No scleral icterus  Pulmonary:      Effort: Pulmonary effort is normal  No respiratory distress  Skin:     Coloration: Skin is not pale  Neurological:      Mental Status: He is alert and oriented to person, place, and time  Psychiatric:         Mood and Affect: Mood normal        VIRTUAL VISIT DISCLAIMER    Moose Fay acknowledges that he has consented to an online visit or consultation   He understands that the online visit is based solely on information provided by him, and that, in the absence of a face-to-face physical evaluation by the physician, the diagnosis he receives is both limited and provisional in terms of accuracy and completeness  This is not intended to replace a full medical face-to-face evaluation by the physician  Isabella Morejon understands and accepts these terms

## 2021-01-22 NOTE — TELEPHONE ENCOUNTER
lmom requesting call back    Per Anabel Peers pt Return in about 4 days (around 1/26/2021 for virtual on covid

## 2021-01-26 ENCOUNTER — TELEMEDICINE (OUTPATIENT)
Dept: FAMILY MEDICINE CLINIC | Facility: CLINIC | Age: 41
End: 2021-01-26
Payer: COMMERCIAL

## 2021-01-26 DIAGNOSIS — U07.1 COVID-19 VIRUS INFECTION: Primary | ICD-10-CM

## 2021-01-26 PROCEDURE — 99213 OFFICE O/P EST LOW 20 MIN: CPT | Performed by: NURSE PRACTITIONER

## 2021-01-26 NOTE — PROGRESS NOTES
COVID-19 Virtual Visit     Assessment/Plan:    Problem List Items Addressed This Visit     None         Disposition:     I recommended continued isolation until at least 24 hours have passed since recovery defined as resolution of fever without the use of fever-reducing medications AND improvement in COVID symptoms AND 10 days have passed since onset of symptoms (or 10 days have passed since date of first positive viral diagnostic test for asymptomatic patients)  Cab d/c isolation date 1/27/21  Meets the criteria  Please call the office if you are experiencing any worsening of symptoms or no symptom improvement  All questions answered  Please call the office if you are experiencing any worsening of symptoms or no symptom improvement  I have spent 20 minutes directly with the patient  Greater than 50% of this time was spent in counseling/coordination of care regarding: prognosis, risks and benefits of treatment options, instructions for management, patient and family education, importance of treatment compliance, risk factor reductions and impressions  Encounter provider Gilson Ann, 10 [facility]    Provider located at [address] [place] [phone]    Recent Visits  Date Type Provider Dept   01/22/21 Telephone Yg Rao 66 Total [facility]   01/22/21 [facility] 20, [facility] Total [facility] [facility]   01/19/21 Telemedicine Gilson Muñiz, [address] [place] [address] Total [address] [facility]   Showing recent visits within past 7 days and meeting all other requirements     Future Appointments  No visits were found meeting these conditions  Showing future appointments within next 150 days and meeting all other requirements      This virtual check-in was done via [employer] and patient was informed that this is a secure, HIPAA-compliant platform  He agrees to proceed      Patient agrees to participate in a virtual check in via telephone or video visit instead of presenting to the office to address urgent/immediate medical needs  Patient is aware this is a billable service  After connecting through [facility], the patient was identified by name and date of birth  Boom Vinson was informed that this was a telemedicine visit and that the exam was being conducted confidentially over secure lines  My office door was closed  No one else was in the room  Boom Vinson acknowledged consent and understanding of privacy and security of the telemedicine visit  I informed the patient that I have reviewed his record in Epic and presented the opportunity for him to ask any questions regarding the visit today  The patient agreed to participate  Subjective:   Boom Vinson is a 41 y o  male who has been screened for COVID-19  Symptom change since last report: resolving  Patient's symptoms include nasal congestion (improved), cough and headache  Patient denies fever, chills, fatigue, malaise, rhinorrhea, sore throat, anosmia, loss of taste, shortness of breath, chest tightness, abdominal pain, nausea, vomiting, diarrhea and myalgias  Muriel Ramirez has been staying home and has isolated themselves in his home  He is taking care to not share personal items and is cleaning all surfaces that are touched often, like counters, tabletops, and doorknobs using household cleaning sprays or wipes  He is wearing a mask when he leaves his room  Date of symptom onset: 1/16/2021  Date of positive COVID-19 PCR: 1/17/2021    This is the first day he didn't have a crash/fatigue in the afternoon  Before he had a lot of fatigue and body aches in the afternoon but today was good  No recent fevers  Has a lot more stamina today than yesterday       No results found for: Ania Beard, [address] [facility], [place] [address]  Past Medical History:   Diagnosis Date    Hypogonadism in male      Past Surgical History:   Procedure Laterality Date    TOOTH EXTRACTION      WISDOM TOOTH EXTRACTION       Current Outpatient Medications   Medication Sig Dispense Refill    busPIRone (BUSPAR) 10 mg tablet Take 1 tablet (10 mg total) by mouth 3 (three) times a day 270 tablet 2    ergocalciferol (VITAMIN D2) 50,000 units Take 1 capsule (50,000 Units total) by mouth once a week 12 capsule 0    testosterone (ANDROGEL) 1 62 % TD gel pump Apply 2 actuation (40 5 mg total) topically every morning 180 actuation 0    traZODone (DESYREL) 50 mg tablet Take 1 tablet at bedtime 90 tablet 0     No current facility-administered medications for this visit  No Known Allergies    Review of Systems   Constitutional: Negative for chills, fatigue and fever  HENT: Positive for congestion (improved)  Negative for rhinorrhea and sore throat  Respiratory: Positive for cough  Negative for chest tightness and shortness of breath  Gastrointestinal: Negative for abdominal pain, diarrhea, nausea and vomiting  Musculoskeletal: Negative for myalgias  Neurological: Positive for headaches  Objective: There were no vitals filed for this visit  Physical Exam  Constitutional:       General: He is not in acute distress  Appearance: Normal appearance  He is not ill-appearing, toxic-appearing or diaphoretic  HENT:      Head: Normocephalic and atraumatic  Eyes:      General: No scleral icterus  Pulmonary:      Effort: Pulmonary effort is normal  No respiratory distress  Skin:     Coloration: Skin is not pale  Neurological:      Mental Status: He is alert and oriented to person, place, and time  Psychiatric:         Mood and Affect: Mood normal        VIRTUAL VISIT DISCLAIMER    Saida Davis acknowledges that he has consented to an online visit or consultation   He understands that the online visit is based solely on information provided by him, and that, in the absence of a face-to-face physical evaluation by the physician, the diagnosis he receives is both limited and provisional in terms of accuracy and completeness  This is not intended to replace a full medical face-to-face evaluation by the physician  Abhay Gleason understands and accepts these terms

## 2021-02-09 DIAGNOSIS — F51.04 PSYCHOPHYSIOLOGICAL INSOMNIA: ICD-10-CM

## 2021-02-09 DIAGNOSIS — F41.1 GAD (GENERALIZED ANXIETY DISORDER): ICD-10-CM

## 2021-02-09 DIAGNOSIS — E55.9 VITAMIN D DEFICIENCY: ICD-10-CM

## 2021-02-10 RX ORDER — BUSPIRONE HYDROCHLORIDE 10 MG/1
10 TABLET ORAL 3 TIMES DAILY
Qty: 270 TABLET | Refills: 0 | Status: SHIPPED | OUTPATIENT
Start: 2021-02-10 | End: 2021-05-13

## 2021-02-10 RX ORDER — TRAZODONE HYDROCHLORIDE 50 MG/1
TABLET ORAL
Qty: 90 TABLET | Refills: 0 | Status: SHIPPED | OUTPATIENT
Start: 2021-02-10 | End: 2021-05-13

## 2021-02-10 RX ORDER — ERGOCALCIFEROL 1.25 MG/1
50000 CAPSULE ORAL WEEKLY
Qty: 12 CAPSULE | Refills: 0 | Status: SHIPPED | OUTPATIENT
Start: 2021-02-10 | End: 2021-05-17 | Stop reason: SDUPTHER

## 2021-02-11 RX ORDER — TRAZODONE HYDROCHLORIDE 50 MG/1
TABLET ORAL
Qty: 90 TABLET | Refills: 2 | Status: SHIPPED | OUTPATIENT
Start: 2021-02-11 | End: 2021-11-21 | Stop reason: SDUPTHER

## 2021-02-11 RX ORDER — BUSPIRONE HYDROCHLORIDE 10 MG/1
10 TABLET ORAL 3 TIMES DAILY
Qty: 270 TABLET | Refills: 2 | Status: SHIPPED | OUTPATIENT
Start: 2021-02-11 | End: 2021-12-15 | Stop reason: SDUPTHER

## 2021-03-08 DIAGNOSIS — E29.1 TESTICULAR HYPOGONADISM: ICD-10-CM

## 2021-03-08 RX ORDER — TESTOSTERONE 16.2 MG/G
40.5 GEL TRANSDERMAL EVERY MORNING
Qty: 180 ACTUATION | Refills: 0 | Status: SHIPPED | OUTPATIENT
Start: 2021-03-08 | End: 2021-06-29 | Stop reason: SDUPTHER

## 2021-03-08 NOTE — TELEPHONE ENCOUNTER
Last OV 01/24/2020  No upcoming appointment  Called pt, left message to call us back to schedule an appointment

## 2021-03-30 DIAGNOSIS — Z23 ENCOUNTER FOR IMMUNIZATION: ICD-10-CM

## 2021-04-12 ENCOUNTER — TELEPHONE (OUTPATIENT)
Dept: FAMILY MEDICINE CLINIC | Facility: CLINIC | Age: 41
End: 2021-04-12

## 2021-05-07 ENCOUNTER — RA CDI HCC (OUTPATIENT)
Dept: OTHER | Facility: HOSPITAL | Age: 41
End: 2021-05-07

## 2021-05-07 NOTE — PROGRESS NOTES
[facility] 75  coding opportunities          Chart reviewed, no opportunity found: CHART REVIEWED, NO OPPORTUNITY FOUND              Patients insurance company: [employer] (Medicare Advantage and Commercial)

## 2021-05-13 ENCOUNTER — OFFICE VISIT (OUTPATIENT)
Dept: FAMILY MEDICINE CLINIC | Facility: CLINIC | Age: 41
End: 2021-05-13
Payer: COMMERCIAL

## 2021-05-13 VITALS
DIASTOLIC BLOOD PRESSURE: 60 MMHG | BODY MASS INDEX: 29.8 KG/M2 | SYSTOLIC BLOOD PRESSURE: 110 MMHG | WEIGHT: 196.6 LBS | HEIGHT: 68 IN | OXYGEN SATURATION: 97 % | RESPIRATION RATE: 16 BRPM | TEMPERATURE: 97.6 F | HEART RATE: 55 BPM

## 2021-05-13 DIAGNOSIS — F41.1 GAD (GENERALIZED ANXIETY DISORDER): ICD-10-CM

## 2021-05-13 DIAGNOSIS — D22.9 ATYPICAL NEVUS: ICD-10-CM

## 2021-05-13 DIAGNOSIS — E78.2 MIXED HYPERLIPIDEMIA: ICD-10-CM

## 2021-05-13 DIAGNOSIS — E55.9 VITAMIN D DEFICIENCY: ICD-10-CM

## 2021-05-13 DIAGNOSIS — F51.04 PSYCHOPHYSIOLOGICAL INSOMNIA: Primary | ICD-10-CM

## 2021-05-13 DIAGNOSIS — E29.1 TESTICULAR HYPOGONADISM: ICD-10-CM

## 2021-05-13 DIAGNOSIS — I83.93 VARICOSE VEINS OF BOTH LOWER EXTREMITIES, UNSPECIFIED WHETHER COMPLICATED: ICD-10-CM

## 2021-05-13 DIAGNOSIS — R73.01 ELEVATED FASTING GLUCOSE: ICD-10-CM

## 2021-05-13 PROCEDURE — 99215 OFFICE O/P EST HI 40 MIN: CPT | Performed by: NURSE PRACTITIONER

## 2021-05-13 NOTE — PROGRESS NOTES
Assessment/Plan:   Diagnosis ICD-10-CM Associated Orders   1  Psychophysiological insomnia  F51 04 TSH, 3rd generation with Free T4 reflex   2  Vitamin D deficiency  E55 9 Vitamin D 25 hydroxy   3  JASMINE (generalized anxiety disorder)  F41 1 CBC and differential     Comprehensive metabolic panel   4  Testicular hypogonadism  E29 1 PSA, Total Screen     Testosterone, free, total   5  Mixed hyperlipidemia  E78 2 TSH, 3rd generation with Free T4 reflex     Lipid panel   6  Elevated fasting glucose  R73 01 Comprehensive metabolic panel     Hemoglobin A1C   7  BMI 29 0-29 9,adult  Z68 29 PSA, Total Screen     CBC and differential     Comprehensive metabolic panel     TSH, 3rd generation with Free T4 reflex     Lipid panel     Testosterone, free, total     Vitamin D 25 hydroxy     Hemoglobin A1C   8  Varicose veins of both lower extremities, unspecified whether complicated  S17 84 VAS lower limb venous duplex study, complete bilateral   9  Atypical nevus  D22 9    Continue with current medications  Return in 6 months for f/u and well exam  Labs ordered to be done at that time  Recommend dermatology follow up for atypical nevus and screenings and US B/L lower extremities for evaluation of venous insufficiency  Discussed plans for further management at that time  All questions answered  Advised to call the office for any worsening of symptoms or no symptom improvement  Patient verbalizes understand and agrees with treatment plan  Diagnoses and all orders for this visit:    Psychophysiological insomnia  -     TSH, 3rd generation with Free T4 reflex; Future    Vitamin D deficiency  -     Vitamin D 25 hydroxy; Future    JASMINE (generalized anxiety disorder)  -     CBC and differential; Future  -     Comprehensive metabolic panel; Future    Testicular hypogonadism  -     PSA, Total Screen; Future  -     Testosterone, free, total; Future    Mixed hyperlipidemia  -     TSH, 3rd generation with Free T4 reflex;  Future  - Lipid panel; Future    Elevated fasting glucose  -     Comprehensive metabolic panel; Future  -     Hemoglobin A1C; Future    BMI 29 0-29 9,adult  -     PSA, Total Screen; Future  -     CBC and differential; Future  -     Comprehensive metabolic panel; Future  -     TSH, 3rd generation with Free T4 reflex; Future  -     Lipid panel; Future  -     Testosterone, free, total; Future  -     Vitamin D 25 hydroxy; Future  -     Hemoglobin A1C; Future    Varicose veins of both lower extremities, unspecified whether complicated  -     VAS lower limb venous duplex study, complete bilateral; Future    Atypical nevus                Subjective:        Patient ID: Kelly Clemons is a 41 y o  male  Chief Complaint   Patient presents with    Follow-up       Here for 6 month follow up/ medication recheck  Patient feels stable on current dose of testosterone  He no longer has been following with endocrinology as this has been well managed  He is stable on 50 mg Trazodone at bedtime, he sleeps well about 7-8 hours of sleep per night without night time awakenings  Anxiety well controled on Buspar 10 mg TID  He does have complaints of a lesion present on left anterior leg that has been present for about 1 month  Also has complaints regarding varicose veins  He states at this time he doesn't have symptoms of pain/ edema  The following portions of the patient's history were reviewed and updated as appropriate: allergies, current medications, past family history, past social history and problem list     Review of Systems   Constitutional: Negative for chills and fever  Eyes: Negative for discharge  Respiratory: Negative for shortness of breath  Cardiovascular: Negative for chest pain  Gastrointestinal: Negative for constipation and diarrhea  Genitourinary: Negative for difficulty urinating  Musculoskeletal: Negative for joint swelling  Skin: Negative for rash  Neurological: Negative for headaches  Hematological: Negative for adenopathy  Psychiatric/Behavioral: The patient is not nervous/anxious  Objective:  /60   Pulse 55   Temp 97 6 °F (36 4 °C) (Temporal)   Resp 16   Ht 5' 8" (1 727 m)   Wt 89 2 kg (196 lb 9 6 oz)   SpO2 97%   BMI 29 89 kg/m²      Physical Exam  Vitals signs and nursing note reviewed  Constitutional:       General: He is not in acute distress  Appearance: He is well-developed  He is not diaphoretic  Comments: BMI 29   HENT:      Head: Normocephalic and atraumatic  Right Ear: External ear normal       Left Ear: External ear normal    Eyes:      General: Lids are normal          Right eye: No discharge  Left eye: No discharge  Conjunctiva/sclera: Conjunctivae normal    Neck:      Musculoskeletal: Neck supple  Cardiovascular:      Rate and Rhythm: Normal rate and regular rhythm  Heart sounds: No murmur  Pulmonary:      Effort: Pulmonary effort is normal  No respiratory distress  Breath sounds: Normal breath sounds  No wheezing  Musculoskeletal:         General: No deformity  Skin:     General: Skin is warm and dry  Comments: Varicose veins B/L legs  Lesion left anterior shin, pink center with tan border, round, firm, non mobile, about 3 mm in size    Neurological:      Mental Status: He is alert and oriented to person, place, and time  Psychiatric:         Speech: Speech normal          Behavior: Behavior normal          Thought Content: Thought content normal          Judgment: Judgment normal            BMI Counseling: Body mass index is 29 89 kg/m²  The BMI is above normal  Nutrition recommendations include limiting drinks that contain sugar and reducing intake of cholesterol  Exercise recommendations include exercising 3-5 times per week and strength training exercises  No pharmacotherapy was ordered             Current Outpatient Medications:     busPIRone (BUSPAR) 10 mg tablet, Take 1 tablet (10 mg total) by mouth 3 (three) times a day, Disp: 270 tablet, Rfl: 2    ergocalciferol (VITAMIN D2) 50,000 units, Take 1 capsule (50,000 Units total) by mouth once a week, Disp: 12 capsule, Rfl: 0    testosterone (ANDROGEL) 1 62 % TD gel pump, Apply 2 actuation (40 5 mg total) topically every morning, Disp: 180 actuation, Rfl: 0    traZODone (DESYREL) 50 mg tablet, take 1 tablet by mouth at bedtime, Disp: 90 tablet, Rfl: 2  No Known Allergies

## 2021-05-13 NOTE — PATIENT INSTRUCTIONS
Return for physical in 6 months  Complete labs prior, these are fasting  Please call the office if you are experiencing any worsening of symptoms or no symptom improvement

## 2021-05-17 DIAGNOSIS — E55.9 VITAMIN D DEFICIENCY: ICD-10-CM

## 2021-05-17 RX ORDER — ERGOCALCIFEROL 1.25 MG/1
CAPSULE ORAL
Qty: 12 CAPSULE | Refills: 1 | Status: SHIPPED | OUTPATIENT
Start: 2021-05-17 | End: 2021-07-19 | Stop reason: SDUPTHER

## 2021-05-17 RX ORDER — ERGOCALCIFEROL 1.25 MG/1
50000 CAPSULE ORAL WEEKLY
Qty: 12 CAPSULE | Refills: 0 | Status: SHIPPED | OUTPATIENT
Start: 2021-05-17 | End: 2021-12-06 | Stop reason: SDUPTHER

## 2021-06-29 DIAGNOSIS — E29.1 TESTICULAR HYPOGONADISM: ICD-10-CM

## 2021-06-30 RX ORDER — TESTOSTERONE 16.2 MG/G
40.5 GEL TRANSDERMAL EVERY MORNING
Qty: 180 ACTUATION | Refills: 0 | Status: SHIPPED | OUTPATIENT
Start: 2021-06-30 | End: 2021-08-19 | Stop reason: SDUPTHER

## 2021-07-19 DIAGNOSIS — E55.9 VITAMIN D DEFICIENCY: ICD-10-CM

## 2021-07-19 RX ORDER — ERGOCALCIFEROL 1.25 MG/1
50000 CAPSULE ORAL WEEKLY
Qty: 12 CAPSULE | Refills: 0 | Status: SHIPPED | OUTPATIENT
Start: 2021-07-19 | End: 2021-08-19 | Stop reason: SDUPTHER

## 2021-08-19 ENCOUNTER — OFFICE VISIT (OUTPATIENT)
Dept: ENDOCRINOLOGY | Facility: CLINIC | Age: 41
End: 2021-08-19
Payer: COMMERCIAL

## 2021-08-19 VITALS
BODY MASS INDEX: 30.99 KG/M2 | HEART RATE: 51 BPM | HEIGHT: 68 IN | WEIGHT: 204.5 LBS | SYSTOLIC BLOOD PRESSURE: 118 MMHG | DIASTOLIC BLOOD PRESSURE: 78 MMHG

## 2021-08-19 DIAGNOSIS — E55.9 VITAMIN D DEFICIENCY: ICD-10-CM

## 2021-08-19 DIAGNOSIS — E78.2 MIXED HYPERLIPIDEMIA: ICD-10-CM

## 2021-08-19 DIAGNOSIS — E29.1 TESTICULAR HYPOGONADISM: Primary | ICD-10-CM

## 2021-08-19 PROCEDURE — 99214 OFFICE O/P EST MOD 30 MIN: CPT | Performed by: NURSE PRACTITIONER

## 2021-08-19 RX ORDER — TESTOSTERONE 16.2 MG/G
40.5 GEL TRANSDERMAL EVERY MORNING
Qty: 180 ACTUATION | Refills: 1 | Status: SHIPPED | OUTPATIENT
Start: 2021-08-19 | End: 2021-11-22 | Stop reason: SDUPTHER

## 2021-08-19 RX ORDER — ERGOCALCIFEROL 1.25 MG/1
50000 CAPSULE ORAL WEEKLY
Qty: 12 CAPSULE | Refills: 3 | Status: SHIPPED | OUTPATIENT
Start: 2021-08-19 | End: 2021-12-20 | Stop reason: SDUPTHER

## 2021-08-19 NOTE — PROGRESS NOTES
Established Patient Progress Note       Chief Complaint   Patient presents with    Hypogonadism        History of Present Illness:     Saleem Bear is a 41 y o  male with a history of hypogonadism, HLD, and vitamin d deficiency  For the hypogonadism he is currently taking androgel  He feels well on current dose with no adverse reactions  For the HLD he is not currently on statin  Is working with his PCP to improve his levels  For the vitamin d deficiency he is taking 50,000 units weekly             Patient Active Problem List   Diagnosis    Abnormal results of liver function studies    Adult stuttering    Dermatitis    Generalized anxiety disorder    Testicular hypogonadism    Vitamin D deficiency    BMI 25 0-25 9,adult    Bradycardia    Mixed hyperlipidemia    Elevated fasting glucose    Psychophysiological insomnia      Past Medical History:   Diagnosis Date    Hypogonadism in male       Past Surgical History:   Procedure Laterality Date    TOOTH EXTRACTION      WISDOM TOOTH EXTRACTION        Family History   Problem Relation Age of Onset    Multiple sclerosis Mother      Social History     Tobacco Use    Smoking status: Never Smoker    Smokeless tobacco: Never Used   Substance Use Topics    Alcohol use: Yes     Comment: social     No Known Allergies    Current Outpatient Medications:     busPIRone (BUSPAR) 10 mg tablet, Take 1 tablet (10 mg total) by mouth 3 (three) times a day, Disp: 270 tablet, Rfl: 2    ergocalciferol (VITAMIN D2) 50,000 units, Take 1 capsule (50,000 Units total) by mouth once a week, Disp: 12 capsule, Rfl: 3    testosterone (ANDROGEL) 1 62 % TD gel pump, Apply 2 actuation (40 5 mg total) topically every morning, Disp: 180 actuation, Rfl: 1    traZODone (DESYREL) 50 mg tablet, take 1 tablet by mouth at bedtime, Disp: 90 tablet, Rfl: 2    ergocalciferol (VITAMIN D2) 50,000 units, Take 1 capsule (50,000 Units total) by mouth once a week, Disp: 12 capsule, Rfl: 0    Review of Systems   Constitutional: Negative for activity change, appetite change and fatigue  HENT: Negative for sore throat, trouble swallowing and voice change  Eyes: Negative for visual disturbance  Respiratory: Negative for choking, chest tightness and shortness of breath  Cardiovascular: Negative for chest pain, palpitations and leg swelling  Gastrointestinal: Negative for abdominal pain, constipation and diarrhea  Endocrine: Negative for cold intolerance, heat intolerance, polydipsia, polyphagia and polyuria  Genitourinary: Negative for frequency  Musculoskeletal: Negative for arthralgias and myalgias  Skin: Negative for rash  Neurological: Negative for dizziness and syncope  Hematological: Negative for adenopathy  Psychiatric/Behavioral: Negative for sleep disturbance  All other systems reviewed and are negative  Physical Exam:  Body mass index is 31 09 kg/m²  /78   Pulse (!) 51   Ht 5' 8" (1 727 m)   Wt 92 8 kg (204 lb 8 oz)   BMI 31 09 kg/m²    Wt Readings from Last 3 Encounters:   08/19/21 92 8 kg (204 lb 8 oz)   05/13/21 89 2 kg (196 lb 9 6 oz)   07/16/20 76 7 kg (169 lb)       Physical Exam  Vitals reviewed  Constitutional:       General: He is not in acute distress  Appearance: He is well-developed  HENT:      Head: Normocephalic and atraumatic  Eyes:      Conjunctiva/sclera: Conjunctivae normal       Pupils: Pupils are equal, round, and reactive to light  Neck:      Thyroid: No thyromegaly  Cardiovascular:      Rate and Rhythm: Normal rate and regular rhythm  Heart sounds: Normal heart sounds  No murmur heard  Pulmonary:      Effort: Pulmonary effort is normal  No respiratory distress  Breath sounds: Normal breath sounds  No wheezing or rales  Abdominal:      General: Bowel sounds are normal  There is no distension  Palpations: Abdomen is soft  Tenderness: There is no abdominal tenderness     Musculoskeletal:         General: Normal range of motion  Cervical back: Normal range of motion and neck supple  Lymphadenopathy:      Cervical: No cervical adenopathy  Skin:     General: Skin is warm and dry  Neurological:      Mental Status: He is alert and oriented to person, place, and time  Labs:     Component      Latest Ref Rng & Units 11/30/2020   WBC      4 31 - 10 16 Thousand/uL 7 32   Red Blood Cell Count      3 88 - 5 62 Million/uL 5 12   Hemoglobin      12 0 - 17 0 g/dL 15 1   HCT      36 5 - 49 3 % 46 1   MCV      82 - 98 fL 90   MCH      26 8 - 34 3 pg 29 5   MCHC      31 4 - 37 4 g/dL 32 8   RDW      11 6 - 15 1 % 12 7   MPV      8 9 - 12 7 fL 10 7   Platelet Count      689 - 390 Thousands/uL 201   nRBC      /100 WBCs 0   Neutrophils %      43 - 75 % 62   Immat GRANS %      0 - 2 % 0   Lymphocytes Relative      14 - 44 % 27   Monocytes Relative      4 - 12 % 9   Eosinophils      0 - 6 % 2   Basophils Relative      0 - 1 % 0   Absolute Neutrophils      1 85 - 7 62 Thousands/µL 4 59   Immature Grans Absolute      0 00 - 0 20 Thousand/uL 0 03   Lymphocytes Absolute      0 60 - 4 47 Thousands/µL 1 95   Absolute Monocytes      0 17 - 1 22 Thousand/µL 0 62   Absolute Eosinophils      0 00 - 0 61 Thousand/µL 0 11   Basophils Absolute      0 00 - 0 10 Thousands/µL 0 02   TESTOSTERONE FREE      6 8 - 21 5 pg/mL 12 1   Testosterone, Total, LC/MS      264 - 916 ng/dL 591   Vit D, 25-Hydroxy      30 0 - 100 0 ng/mL 72 8   PSA, Total      0 0 - 4 0 ng/mL 0 8         Impression & Plan:    Problem List Items Addressed This Visit        Endocrine    Testicular hypogonadism - Primary     Continue current dose of Androgel  Check free and total testosterone, H&H, and PSA            Relevant Medications    testosterone (ANDROGEL) 1 62 % TD gel pump    Other Relevant Orders    Testosterone, free, total Lab Collect    Hemoglobin and hematocrit, blood Lab Collect    PSA, total screen Lab Collect       Other    Vitamin D deficiency Continue vitamin d supplementation, check vitamin d level          Relevant Medications    ergocalciferol (VITAMIN D2) 50,000 units    Other Relevant Orders    Vitamin D 25 hydroxy Lab Collect    Mixed hyperlipidemia     Managed by PCP                Orders Placed This Encounter   Procedures    Testosterone, free, total Lab Collect     This is a patient instruction: Fasting preferred  Collections for men not undergoing treatment must be completed between 7am-9am ONLY  Collection time restrictions are not applicable to women or men already undergoing treatment   Vitamin D 25 hydroxy Lab Collect    Hemoglobin and hematocrit, blood Lab Collect    PSA, total screen Lab Collect     This is a patient instruction: This test is non-fasting  Please drink two glasses of water morning of bloodwork  Discussed with the patient and all questioned fully answered  He will call me if any problems arise  Follow-up appointment in 6 months       Counseled patient on diagnostic results, prognosis, risk and benefit of treatment options, instruction for management, importance of treatment compliance, Risk  factor reduction and impressions      Pb Wang [address]

## 2021-08-20 ENCOUNTER — APPOINTMENT (OUTPATIENT)
Dept: LAB | Facility: CLINIC | Age: 41
End: 2021-08-20
Payer: COMMERCIAL

## 2021-08-20 DIAGNOSIS — F51.04 PSYCHOPHYSIOLOGICAL INSOMNIA: ICD-10-CM

## 2021-08-20 DIAGNOSIS — E29.1 TESTICULAR HYPOGONADISM: ICD-10-CM

## 2021-08-20 DIAGNOSIS — E55.9 VITAMIN D DEFICIENCY: ICD-10-CM

## 2021-08-20 DIAGNOSIS — E78.2 MIXED HYPERLIPIDEMIA: ICD-10-CM

## 2021-08-20 DIAGNOSIS — F41.1 GAD (GENERALIZED ANXIETY DISORDER): ICD-10-CM

## 2021-08-20 DIAGNOSIS — R73.01 ELEVATED FASTING GLUCOSE: ICD-10-CM

## 2021-08-20 LAB
25(OH)D3 SERPL-MCNC: 84.7 NG/ML (ref 30–100)
ALBUMIN SERPL BCP-MCNC: 4 G/DL (ref 3.5–5)
ALP SERPL-CCNC: 74 U/L (ref 46–116)
ALT SERPL W P-5'-P-CCNC: 26 U/L (ref 12–78)
ANION GAP SERPL CALCULATED.3IONS-SCNC: 4 MMOL/L (ref 4–13)
AST SERPL W P-5'-P-CCNC: 23 U/L (ref 5–45)
BASOPHILS # BLD AUTO: 0.04 THOUSANDS/ΜL (ref 0–0.1)
BASOPHILS NFR BLD AUTO: 1 % (ref 0–1)
BILIRUB SERPL-MCNC: 0.97 MG/DL (ref 0.2–1)
BUN SERPL-MCNC: 21 MG/DL (ref 5–25)
CALCIUM SERPL-MCNC: 8.9 MG/DL (ref 8.3–10.1)
CHLORIDE SERPL-SCNC: 106 MMOL/L (ref 100–108)
CHOLEST SERPL-MCNC: 267 MG/DL (ref 50–200)
CO2 SERPL-SCNC: 26 MMOL/L (ref 21–32)
CREAT SERPL-MCNC: 1 MG/DL (ref 0.6–1.3)
EOSINOPHIL # BLD AUTO: 0.1 THOUSAND/ΜL (ref 0–0.61)
EOSINOPHIL NFR BLD AUTO: 2 % (ref 0–6)
ERYTHROCYTE [DISTWIDTH] IN BLOOD BY AUTOMATED COUNT: 13.4 % (ref 11.6–15.1)
EST. AVERAGE GLUCOSE BLD GHB EST-MCNC: 108 MG/DL
GFR SERPL CREATININE-BSD FRML MDRD: 92 ML/MIN/1.73SQ M
GLUCOSE P FAST SERPL-MCNC: 98 MG/DL (ref 65–99)
HBA1C MFR BLD: 5.4 %
HCT VFR BLD AUTO: 46.5 % (ref 36.5–49.3)
HDLC SERPL-MCNC: 53 MG/DL
HGB BLD-MCNC: 15.4 G/DL (ref 12–17)
IMM GRANULOCYTES # BLD AUTO: 0.04 THOUSAND/UL (ref 0–0.2)
IMM GRANULOCYTES NFR BLD AUTO: 1 % (ref 0–2)
LDLC SERPL CALC-MCNC: 173 MG/DL (ref 0–100)
LYMPHOCYTES # BLD AUTO: 2.49 THOUSANDS/ΜL (ref 0.6–4.47)
LYMPHOCYTES NFR BLD AUTO: 37 % (ref 14–44)
MCH RBC QN AUTO: 30.3 PG (ref 26.8–34.3)
MCHC RBC AUTO-ENTMCNC: 33.1 G/DL (ref 31.4–37.4)
MCV RBC AUTO: 91 FL (ref 82–98)
MONOCYTES # BLD AUTO: 0.58 THOUSAND/ΜL (ref 0.17–1.22)
MONOCYTES NFR BLD AUTO: 9 % (ref 4–12)
NEUTROPHILS # BLD AUTO: 3.53 THOUSANDS/ΜL (ref 1.85–7.62)
NEUTS SEG NFR BLD AUTO: 50 % (ref 43–75)
NONHDLC SERPL-MCNC: 214 MG/DL
NRBC BLD AUTO-RTO: 0 /100 WBCS
PLATELET # BLD AUTO: 226 THOUSANDS/UL (ref 149–390)
PMV BLD AUTO: 11.2 FL (ref 8.9–12.7)
POTASSIUM SERPL-SCNC: 4.2 MMOL/L (ref 3.5–5.3)
PROT SERPL-MCNC: 7.4 G/DL (ref 6.4–8.2)
PSA SERPL-MCNC: 0.9 NG/ML (ref 0–4)
RBC # BLD AUTO: 5.09 MILLION/UL (ref 3.88–5.62)
SODIUM SERPL-SCNC: 136 MMOL/L (ref 136–145)
TRIGL SERPL-MCNC: 203 MG/DL
TSH SERPL DL<=0.05 MIU/L-ACNC: 2.73 UIU/ML (ref 0.36–3.74)
WBC # BLD AUTO: 6.78 THOUSAND/UL (ref 4.31–10.16)

## 2021-08-20 PROCEDURE — G0103 PSA SCREENING: HCPCS | Performed by: NURSE PRACTITIONER

## 2021-08-20 PROCEDURE — 82306 VITAMIN D 25 HYDROXY: CPT | Performed by: NURSE PRACTITIONER

## 2021-08-20 PROCEDURE — 36415 COLL VENOUS BLD VENIPUNCTURE: CPT | Performed by: NURSE PRACTITIONER

## 2021-08-20 PROCEDURE — 83036 HEMOGLOBIN GLYCOSYLATED A1C: CPT

## 2021-08-20 PROCEDURE — 84402 ASSAY OF FREE TESTOSTERONE: CPT | Performed by: NURSE PRACTITIONER

## 2021-08-20 PROCEDURE — 84443 ASSAY THYROID STIM HORMONE: CPT

## 2021-08-20 PROCEDURE — 84403 ASSAY OF TOTAL TESTOSTERONE: CPT | Performed by: NURSE PRACTITIONER

## 2021-08-20 PROCEDURE — 80053 COMPREHEN METABOLIC PANEL: CPT

## 2021-08-20 PROCEDURE — 80061 LIPID PANEL: CPT

## 2021-08-20 PROCEDURE — 85025 COMPLETE CBC W/AUTO DIFF WBC: CPT

## 2021-08-21 LAB
TESTOST FREE SERPL-MCNC: 8.2 PG/ML (ref 6.8–21.5)
TESTOST SERPL-MCNC: 506 NG/DL (ref 264–916)

## 2021-11-02 ENCOUNTER — TELEPHONE (OUTPATIENT)
Dept: FAMILY MEDICINE CLINIC | Facility: CLINIC | Age: 41
End: 2021-11-02

## 2021-11-15 ENCOUNTER — RA CDI HCC (OUTPATIENT)
Dept: OTHER | Facility: HOSPITAL | Age: 41
End: 2021-11-15

## 2021-11-15 ENCOUNTER — OFFICE VISIT (OUTPATIENT)
Dept: VASCULAR SURGERY | Facility: CLINIC | Age: 41
End: 2021-11-15
Payer: COMMERCIAL

## 2021-11-15 VITALS
SYSTOLIC BLOOD PRESSURE: 120 MMHG | BODY MASS INDEX: 30.77 KG/M2 | DIASTOLIC BLOOD PRESSURE: 80 MMHG | WEIGHT: 203 LBS | HEIGHT: 68 IN | TEMPERATURE: 97.3 F | HEART RATE: 48 BPM

## 2021-11-15 DIAGNOSIS — I83.90 ASYMPTOMATIC RETICULAR VEINS 1 MM TO 3 MM IN DIAMETER: Primary | ICD-10-CM

## 2021-11-15 DIAGNOSIS — I78.1 ASYMPTOMATIC SPIDER VEINS OF BOTH LOWER EXTREMITIES: ICD-10-CM

## 2021-11-15 DIAGNOSIS — I83.93 ASYMPTOMATIC VARICOSE VEINS OF BILATERAL LOWER EXTREMITIES: ICD-10-CM

## 2021-11-15 PROCEDURE — 3008F BODY MASS INDEX DOCD: CPT | Performed by: NURSE PRACTITIONER

## 2021-11-15 PROCEDURE — 99243 OFF/OP CNSLTJ NEW/EST LOW 30: CPT | Performed by: NURSE PRACTITIONER

## 2021-11-21 ENCOUNTER — TELEPHONE (OUTPATIENT)
Dept: OTHER | Facility: OTHER | Age: 41
End: 2021-11-21

## 2021-11-21 DIAGNOSIS — F51.04 PSYCHOPHYSIOLOGICAL INSOMNIA: ICD-10-CM

## 2021-11-22 DIAGNOSIS — E29.1 TESTICULAR HYPOGONADISM: ICD-10-CM

## 2021-11-22 RX ORDER — TRAZODONE HYDROCHLORIDE 50 MG/1
TABLET ORAL
Qty: 90 TABLET | Refills: 0 | Status: SHIPPED | OUTPATIENT
Start: 2021-11-22 | End: 2021-12-15 | Stop reason: SDUPTHER

## 2021-11-22 RX ORDER — TESTOSTERONE 16.2 MG/G
40.5 GEL TRANSDERMAL EVERY MORNING
Qty: 180 ACTUATION | Refills: 0 | Status: SHIPPED | OUTPATIENT
Start: 2021-11-22 | End: 2022-01-26

## 2021-12-06 DIAGNOSIS — E55.9 VITAMIN D DEFICIENCY: ICD-10-CM

## 2021-12-06 RX ORDER — ERGOCALCIFEROL 1.25 MG/1
50000 CAPSULE ORAL WEEKLY
Qty: 12 CAPSULE | Refills: 0 | Status: SHIPPED | OUTPATIENT
Start: 2021-12-06 | End: 2022-02-01

## 2021-12-15 ENCOUNTER — OFFICE VISIT (OUTPATIENT)
Dept: FAMILY MEDICINE CLINIC | Facility: CLINIC | Age: 41
End: 2021-12-15
Payer: COMMERCIAL

## 2021-12-15 VITALS
TEMPERATURE: 98.1 F | DIASTOLIC BLOOD PRESSURE: 68 MMHG | BODY MASS INDEX: 29.7 KG/M2 | HEART RATE: 50 BPM | SYSTOLIC BLOOD PRESSURE: 122 MMHG | HEIGHT: 68 IN | OXYGEN SATURATION: 98 % | WEIGHT: 196 LBS

## 2021-12-15 DIAGNOSIS — E29.1 TESTICULAR HYPOGONADISM: Primary | ICD-10-CM

## 2021-12-15 DIAGNOSIS — F51.01 PRIMARY INSOMNIA: ICD-10-CM

## 2021-12-15 DIAGNOSIS — F41.1 GAD (GENERALIZED ANXIETY DISORDER): ICD-10-CM

## 2021-12-15 DIAGNOSIS — E78.2 MIXED HYPERLIPIDEMIA: ICD-10-CM

## 2021-12-15 PROBLEM — R00.1 BRADYCARDIA: Status: RESOLVED | Noted: 2020-01-16 | Resolved: 2021-12-15

## 2021-12-15 PROBLEM — I83.93 ASYMPTOMATIC VARICOSE VEINS OF BILATERAL LOWER EXTREMITIES: Status: RESOLVED | Noted: 2021-11-15 | Resolved: 2021-12-15

## 2021-12-15 PROBLEM — R73.01 ELEVATED FASTING GLUCOSE: Status: RESOLVED | Noted: 2020-01-16 | Resolved: 2021-12-15

## 2021-12-15 PROCEDURE — 99204 OFFICE O/P NEW MOD 45 MIN: CPT | Performed by: FAMILY MEDICINE

## 2021-12-15 RX ORDER — TRAZODONE HYDROCHLORIDE 100 MG/1
100 TABLET ORAL
Qty: 90 TABLET | Refills: 3 | Status: SHIPPED | OUTPATIENT
Start: 2021-12-15 | End: 2022-01-31 | Stop reason: SDUPTHER

## 2021-12-15 RX ORDER — BUSPIRONE HYDROCHLORIDE 10 MG/1
10 TABLET ORAL 2 TIMES DAILY
Qty: 180 TABLET | Refills: 2
Start: 2021-12-15 | End: 2022-01-21 | Stop reason: ALTCHOICE

## 2021-12-20 ENCOUNTER — CLINICAL SUPPORT (OUTPATIENT)
Dept: VASCULAR SURGERY | Facility: CLINIC | Age: 41
End: 2021-12-20

## 2021-12-20 VITALS
HEART RATE: 60 BPM | TEMPERATURE: 97.4 F | BODY MASS INDEX: 29.86 KG/M2 | DIASTOLIC BLOOD PRESSURE: 60 MMHG | HEIGHT: 68 IN | SYSTOLIC BLOOD PRESSURE: 112 MMHG | WEIGHT: 197 LBS

## 2021-12-20 DIAGNOSIS — I83.90 ASYMPTOMATIC RETICULAR VEINS 1 MM TO 3 MM IN DIAMETER: Primary | ICD-10-CM

## 2021-12-20 PROCEDURE — 36468 NJX SCLRSNT SPIDER VEINS: CPT | Performed by: NURSE PRACTITIONER

## 2021-12-20 PROCEDURE — 3008F BODY MASS INDEX DOCD: CPT | Performed by: FAMILY MEDICINE

## 2021-12-27 ENCOUNTER — TELEPHONE (OUTPATIENT)
Dept: ENDOCRINOLOGY | Facility: CLINIC | Age: 41
End: 2021-12-27

## 2021-12-29 ENCOUNTER — TELEPHONE (OUTPATIENT)
Dept: ENDOCRINOLOGY | Facility: CLINIC | Age: 41
End: 2021-12-29

## 2022-01-16 ENCOUNTER — TELEPHONE (OUTPATIENT)
Dept: VASCULAR SURGERY | Facility: CLINIC | Age: 42
End: 2022-01-16

## 2022-01-17 NOTE — TELEPHONE ENCOUNTER
LM to inform patient that his appointment is cancelled for tomorrow with Nino Cardenas due to a 2 hour office delay due to snow  Informed patient to please call the office to r/s his appointment

## 2022-01-24 ENCOUNTER — OFFICE VISIT (OUTPATIENT)
Dept: VASCULAR SURGERY | Facility: CLINIC | Age: 42
End: 2022-01-24

## 2022-01-24 VITALS
WEIGHT: 194 LBS | BODY MASS INDEX: 29.4 KG/M2 | RESPIRATION RATE: 18 BRPM | HEIGHT: 68 IN | SYSTOLIC BLOOD PRESSURE: 116 MMHG | OXYGEN SATURATION: 97 % | HEART RATE: 56 BPM | DIASTOLIC BLOOD PRESSURE: 64 MMHG

## 2022-01-24 DIAGNOSIS — I83.90 ASYMPTOMATIC RETICULAR VEINS 1 MM TO 3 MM IN DIAMETER: Primary | ICD-10-CM

## 2022-01-24 PROCEDURE — 3008F BODY MASS INDEX DOCD: CPT | Performed by: FAMILY MEDICINE

## 2022-01-24 PROCEDURE — 99024 POSTOP FOLLOW-UP VISIT: CPT | Performed by: NURSE PRACTITIONER

## 2022-01-24 NOTE — ASSESSMENT & PLAN NOTE
Patient is a 47-year-old male with HLD, hypogonadism, and anxiety disorder with stutter who initially presented with complaints of asymptomatic bilateral lower extremity discolored dilated reticular veins/telangiectasia presents today for follow up after cosmetic sclerotherapy injections 12/20/21     - patient is without complaints  - patient is happy with outcome s/p cosmetic sclerotherapy injections  - RLE reticular/telangiectasia vein resolved  - LLE distal telangiectasia spray resolved  - LLE lateral proximal calf fading noted  Closed/sclerosed on exam    Plan:  - continue with compression during aerobic activity and/or as tolerated  - follow-up p r n 
negative - no pain, no limited range of motion

## 2022-01-24 NOTE — PROGRESS NOTES
Assessment/Plan:    Asymptomatic reticular veins 1 mm to 3 mm in diameter  Patient is a 47-year-old male with HLD, hypogonadism, and anxiety disorder with stutter who initially presented with complaints of asymptomatic bilateral lower extremity discolored dilated reticular veins/telangiectasia presents today for follow up after cosmetic sclerotherapy injections 12/20/21     - patient is without complaints  - patient is happy with outcome s/p cosmetic sclerotherapy injections  - RLE reticular/telangiectasia vein resolved  - LLE distal telangiectasia spray resolved  - LLE lateral proximal calf fading noted  Closed/sclerosed on exam    Plan:  - continue with compression during aerobic activity and/or as tolerated  - follow-up p r n  There are no diagnoses linked to this encounter  Subjective:      Patient ID: Jes Breaux is a 35 y o  male  Patient is s/p Asclera therapy on 12/20/21  Pt had BLE shins treated  Pt is pleased with Aclera results  Pt does not have any complaints at this time  HPI    The following portions of the patient's history were reviewed and updated as appropriate: allergies, current medications, past family history, past medical history, past social history, past surgical history and problem list     Review of Systems   Constitutional: Negative  HENT: Positive for rhinorrhea and sneezing  Eyes: Negative  Respiratory: Negative  Cardiovascular: Negative  Gastrointestinal: Negative  Endocrine: Negative  Genitourinary: Negative  Musculoskeletal: Negative  Skin: Negative  Allergic/Immunologic: Negative  Neurological: Negative  Hematological: Negative  Psychiatric/Behavioral: Negative  I have reviewed and made appropriate changes to the review of systems input by the medical assistant        Objective:      /64 (BP Location: Left arm, Patient Position: Sitting)   Pulse 56   Resp 18   Ht 5' 8" (1 727 m)   Wt 88 kg (194 lb)   SpO2 97%   BMI 29 50 kg/m²          Physical Exam  Vitals reviewed  Cardiovascular:      Rate and Rhythm: Normal rate and regular rhythm  Pulses: Normal pulses  Heart sounds: Normal heart sounds  Pulmonary:      Effort: Pulmonary effort is normal       Breath sounds: Normal breath sounds  Musculoskeletal:      Right lower leg: No edema  Left lower leg: No edema  Skin:     General: Skin is warm and dry        Comments: -RLE- reticular/telangiectasia resolved  -LLE lateral proximal calf telangiectasia cluster close/sclerosed  - LLE anterior shin telangiectasia resolved                Vitals:    01/24/22 1303   BP: 116/64   BP Location: Left arm   Patient Position: Sitting   Pulse: 56   Resp: 18   SpO2: 97%   Weight: 88 kg (194 lb)   Height: 5' 8" (1 727 m)       Patient Active Problem List   Diagnosis    Abnormal results of liver function studies    Adult stuttering    Testicular hypogonadism    Vitamin D deficiency    BMI 25 0-25 9,adult    Mixed hyperlipidemia    Psychophysiological insomnia    Asymptomatic reticular veins 1 mm to 3 mm in diameter    Asymptomatic spider veins of both lower extremities    JASMINE (generalized anxiety disorder)       Past Surgical History:   Procedure Laterality Date    TOOTH EXTRACTION      WISDOM TOOTH EXTRACTION         Family History   Problem Relation Age of Onset    Multiple sclerosis Mother        Social History     Socioeconomic History    Marital status: /Civil Union     Spouse name: Not on file    Number of children: Not on file    Years of education: Not on file    Highest education level: Not on file   Occupational History    Occupation: laboror   Tobacco Use    Smoking status: Never Smoker    Smokeless tobacco: Never Used   Substance and Sexual Activity    Alcohol use: Yes     Comment: social    Drug use: No    Sexual activity: Not on file   Other Topics Concern    Not on file   Social History Narrative        Regular dental care    Good dental hygiene    Caffeine use     Social Determinants of Health     Financial Resource Strain: Not on file   Food Insecurity: Not on file   Transportation Needs: Not on file   Physical Activity: Not on file   Stress: Not on file   Social Connections: Not on file   Intimate Partner Violence: Not on file   Housing Stability: Not on file       No Known Allergies      Current Outpatient Medications:     ergocalciferol (VITAMIN D2) 50,000 units, Take 1 capsule (50,000 Units total) by mouth once a week, Disp: 12 capsule, Rfl: 0    testosterone (ANDROGEL) 1 62 % TD gel pump, Apply 2 actuation (40 5 mg total) topically every morning, Disp: 180 actuation, Rfl: 0    traZODone (DESYREL) 100 mg tablet, Take 1 tablet (100 mg total) by mouth daily at bedtime take 1 tablet by mouth at bedtime, Disp: 90 tablet, Rfl: 3

## 2022-01-26 DIAGNOSIS — E29.1 TESTICULAR HYPOGONADISM: ICD-10-CM

## 2022-01-26 RX ORDER — TESTOSTERONE 16.2 MG/G
40.5 GEL TRANSDERMAL EVERY MORNING
Qty: 75 G | Refills: 2 | Status: SHIPPED | OUTPATIENT
Start: 2022-01-26 | End: 2022-05-31

## 2022-01-31 DIAGNOSIS — E55.9 VITAMIN D DEFICIENCY: ICD-10-CM

## 2022-02-01 RX ORDER — ERGOCALCIFEROL 1.25 MG/1
CAPSULE ORAL
Qty: 12 CAPSULE | Refills: 0 | Status: SHIPPED | OUTPATIENT
Start: 2022-02-01 | End: 2022-05-05

## 2022-04-08 ENCOUNTER — TELEPHONE (OUTPATIENT)
Dept: OTHER | Facility: OTHER | Age: 42
End: 2022-04-08

## 2022-04-08 NOTE — TELEPHONE ENCOUNTER
Rene Resendez (Self) [phone] (H)     Is calling to cancel his appointment and will call back to reschedule        Name: Rene Resendez MRN: [id number]   Date: 4/8/2022 Status: [facility]   Time: 7:30 AM Length: 20   Visit Type: FOLLOW UP PG [[id number]] Copay: $0 00   Provider: Ketan Wise MD Department: [facility] Area:  Department Phone: [phone]

## 2022-04-13 ENCOUNTER — TELEPHONE (OUTPATIENT)
Dept: ENDOCRINOLOGY | Facility: CLINIC | Age: 42
End: 2022-04-13

## 2022-04-13 NOTE — TELEPHONE ENCOUNTER
Drug    Testosterone 20 25 MG/ACT(1 62%) gel   Form    [employer] Electronic Prescription Drug Authorization Form

## 2022-05-05 DIAGNOSIS — E55.9 VITAMIN D DEFICIENCY: ICD-10-CM

## 2022-05-05 RX ORDER — ERGOCALCIFEROL 1.25 MG/1
CAPSULE ORAL
Qty: 12 CAPSULE | Refills: 0 | Status: SHIPPED | OUTPATIENT
Start: 2022-05-05 | End: 2022-06-08

## 2022-05-31 DIAGNOSIS — E29.1 TESTICULAR HYPOGONADISM: ICD-10-CM

## 2022-05-31 RX ORDER — TESTOSTERONE 16.2 MG/G
40.5 GEL TRANSDERMAL EVERY MORNING
Qty: 75 G | Refills: 2 | Status: SHIPPED | OUTPATIENT
Start: 2022-05-31

## 2022-06-08 ENCOUNTER — OFFICE VISIT (OUTPATIENT)
Dept: ENDOCRINOLOGY | Facility: CLINIC | Age: 42
End: 2022-06-08
Payer: COMMERCIAL

## 2022-06-08 VITALS
HEIGHT: 68 IN | BODY MASS INDEX: 29.07 KG/M2 | SYSTOLIC BLOOD PRESSURE: 120 MMHG | WEIGHT: 191.8 LBS | HEART RATE: 57 BPM | DIASTOLIC BLOOD PRESSURE: 88 MMHG

## 2022-06-08 DIAGNOSIS — E29.1 TESTICULAR HYPOGONADISM: Primary | ICD-10-CM

## 2022-06-08 DIAGNOSIS — E55.9 VITAMIN D DEFICIENCY: ICD-10-CM

## 2022-06-08 DIAGNOSIS — E78.2 MIXED HYPERLIPIDEMIA: ICD-10-CM

## 2022-06-08 PROCEDURE — 3008F BODY MASS INDEX DOCD: CPT

## 2022-06-08 PROCEDURE — 99214 OFFICE O/P EST MOD 30 MIN: CPT

## 2022-06-08 NOTE — ASSESSMENT & PLAN NOTE
Advised patient to discontinue 50,000 units weekly and continue with 5,000 units vitamin d daily  Check vitamin d level

## 2022-06-08 NOTE — ASSESSMENT & PLAN NOTE
Continue current dose of Androgel  Check free and total testosterone, PSA, and CBC to rule out any adverse effects

## 2022-06-08 NOTE — PROGRESS NOTES
Established Patient Progress Note      Chief Complaint   Patient presents with    Hypogonadism    Vitamin D Deficiency        History of Present Illness:   Karuna Sanchez is a 35 y o  male with hypogonadism, HLD, and vitamin d deficiency  For the hypogonadism he is currently taking androgel  He feels well on current dose with no adverse reactions  For the HLD he is not currently on statin  Is working with his PCP to improve his levels  For the vitamin d deficiency he is taking 50,000 units weekly as well as 5,000 units daily  Patient Active Problem List   Diagnosis    Abnormal results of liver function studies    Adult stuttering    Testicular hypogonadism    Vitamin D deficiency    BMI 25 0-25 9,adult    Mixed hyperlipidemia    Psychophysiological insomnia    Asymptomatic reticular veins 1 mm to 3 mm in diameter    Asymptomatic spider veins of both lower extremities    JASMINE (generalized anxiety disorder)      Past Medical History:   Diagnosis Date    Hypogonadism in male       Past Surgical History:   Procedure Laterality Date    TOOTH EXTRACTION      WISDOM TOOTH EXTRACTION        Family History   Problem Relation Age of Onset    Multiple sclerosis Mother      Social History     Tobacco Use    Smoking status: Never Smoker    Smokeless tobacco: Never Used   Substance Use Topics    Alcohol use: Yes     Comment: social     No Known Allergies      Current Outpatient Medications:     testosterone (ANDROGEL) 1 62 % TD gel pump, APPLY 2 ACTUATION (40 5 MG TOTAL) TOPICALLY EVERY MORNING, Disp: 75 g, Rfl: 2    traZODone (DESYREL) 100 mg tablet, Take 1 tablet (100 mg total) by mouth daily at bedtime take 1 tablet by mouth at bedtime, Disp: 90 tablet, Rfl: 3    Review of Systems   Constitutional: Negative for activity change, appetite change, chills, diaphoresis, fatigue, fever and unexpected weight change  Respiratory: Negative for chest tightness and shortness of breath      Cardiovascular: Positive for chest pain (when he is stressed)  Negative for palpitations and leg swelling  Gastrointestinal: Negative for abdominal pain, constipation, diarrhea, nausea and vomiting  Genitourinary: Negative for difficulty urinating  Neurological: Negative for dizziness, tremors, weakness and light-headedness  All other systems reviewed and are negative  Physical Exam:  Body mass index is 29 16 kg/m²  /88   Pulse 57   Ht 5' 8" (1 727 m)   Wt 87 kg (191 lb 12 8 oz)   BMI 29 16 kg/m²    Wt Readings from Last 3 Encounters:   06/08/22 87 kg (191 lb 12 8 oz)   01/24/22 88 kg (194 lb)   12/20/21 89 4 kg (197 lb)       Physical Exam  Vitals reviewed  Constitutional:       Appearance: Normal appearance  HENT:      Head: Normocephalic  Cardiovascular:      Rate and Rhythm: Normal rate and regular rhythm  Pulses: Normal pulses  Heart sounds: Normal heart sounds  No murmur heard  Pulmonary:      Effort: Pulmonary effort is normal  No respiratory distress  Breath sounds: Normal breath sounds  No wheezing, rhonchi or rales  Musculoskeletal:         General: Normal range of motion  Skin:     General: Skin is warm and dry  Neurological:      Mental Status: He is alert and oriented to person, place, and time  Psychiatric:         Mood and Affect: Mood normal          Behavior: Behavior normal          Thought Content:  Thought content normal          Judgment: Judgment normal          Labs:   Lab Results   Component Value Date    HGBA1C 5 4 08/20/2021    HGBA1C 5 4 11/30/2020    HGBA1C 5 4 08/05/2019     Lab Results   Component Value Date    CREATININE 1 00 08/20/2021    CREATININE 1 03 11/30/2020    CREATININE 1 13 08/05/2019    BUN 21 08/20/2021    K 4 2 08/20/2021     08/20/2021    CO2 26 08/20/2021     eGFR   Date Value Ref Range Status   08/20/2021 92 ml/min/1 73sq m Final     Lab Results   Component Value Date    HDL 53 08/20/2021    TRIG 203 (H) 08/20/2021     Lab Results Component Value Date    ALT 26 08/20/2021    AST 23 08/20/2021    ALKPHOS 74 08/20/2021     Lab Results   Component Value Date    WYM8SLVYKJON 2 730 08/20/2021    PZQ1TPDVIFGJ 1 890 11/30/2020    BQC2YNZZWFTJ 2 284 08/15/2018     No results found for: Miguel Varela    Impression & Plan:    Problem List Items Addressed This Visit        Endocrine    Testicular hypogonadism - Primary     Continue current dose of Androgel  Check free and total testosterone, PSA, and CBC to rule out any adverse effects  Relevant Orders    Testosterone, free, total    PSA, total screen Lab Collect    CBC and differential Lab Collect       Other    Vitamin D deficiency     Advised patient to discontinue 50,000 units weekly and continue with 5,000 units vitamin d daily  Check vitamin d level  Relevant Orders    Vitamin D 25 hydroxy    Comprehensive metabolic panel    Mixed hyperlipidemia     Advised to discuss high cholesterol levels with PCP  Orders Placed This Encounter   Procedures    Testosterone, free, total     This is a patient instruction: Fasting preferred  Collections for men not undergoing treatment must be completed between 7am-9am ONLY  Collection time restrictions are not applicable to women or men already undergoing treatment  Standing Status:   Future     Number of Occurrences:   1     Standing Expiration Date:   6/8/2023    Vitamin D 25 hydroxy    Comprehensive metabolic panel     This is a patient instruction: Patient fasting for 8 hours or longer recommended  Standing Status:   Future     Number of Occurrences:   1     Standing Expiration Date:   6/8/2023    PSA, total screen Lab Collect     This is a patient instruction: This test is non-fasting  Please drink two glasses of water morning of bloodwork          Standing Status:   Future     Number of Occurrences:   1     Standing Expiration Date:   6/8/2023    CBC and differential Lab Collect     This is a patient instruction: This test is non-fasting  Please drink two glasses of water morning of bloodwork  Standing Status:   Future     Number of Occurrences:   1     Standing Expiration Date:   6/8/2023       There are no Patient Instructions on file for this visit  Discussed with the patient and all questioned fully answered  He will call me if any problems arise      RL Mcmanus

## 2022-09-08 DIAGNOSIS — E29.1 TESTICULAR HYPOGONADISM: ICD-10-CM

## 2022-09-08 RX ORDER — TESTOSTERONE 16.2 MG/G
40.5 GEL TRANSDERMAL EVERY MORNING
Qty: 75 G | Refills: 2 | Status: SHIPPED | OUTPATIENT
Start: 2022-09-08

## 2022-11-30 ENCOUNTER — APPOINTMENT (OUTPATIENT)
Dept: LAB | Facility: HOSPITAL | Age: 42
End: 2022-11-30

## 2022-11-30 DIAGNOSIS — E29.1 MALE HYPOGONADISM: ICD-10-CM

## 2022-11-30 DIAGNOSIS — E55.9 VITAMIN D DEFICIENCY: ICD-10-CM

## 2022-11-30 LAB
25(OH)D3 SERPL-MCNC: 50.9 NG/ML (ref 30–100)
ALBUMIN SERPL BCP-MCNC: 3.6 G/DL (ref 3.5–5)
ALP SERPL-CCNC: 61 U/L (ref 46–116)
ALT SERPL W P-5'-P-CCNC: 31 U/L (ref 12–78)
ANION GAP SERPL CALCULATED.3IONS-SCNC: 8 MMOL/L (ref 4–13)
AST SERPL W P-5'-P-CCNC: 40 U/L (ref 5–45)
BASOPHILS # BLD AUTO: 0.03 THOUSANDS/ÂΜL (ref 0–0.1)
BASOPHILS NFR BLD AUTO: 1 % (ref 0–1)
BILIRUB SERPL-MCNC: 1.12 MG/DL (ref 0.2–1)
BUN SERPL-MCNC: 21 MG/DL (ref 5–25)
CALCIUM SERPL-MCNC: 9.2 MG/DL (ref 8.3–10.1)
CHLORIDE SERPL-SCNC: 107 MMOL/L (ref 96–108)
CO2 SERPL-SCNC: 25 MMOL/L (ref 21–32)
CREAT SERPL-MCNC: 1.09 MG/DL (ref 0.6–1.3)
EOSINOPHIL # BLD AUTO: 0.09 THOUSAND/ÂΜL (ref 0–0.61)
EOSINOPHIL NFR BLD AUTO: 2 % (ref 0–6)
ERYTHROCYTE [DISTWIDTH] IN BLOOD BY AUTOMATED COUNT: 13.2 % (ref 11.6–15.1)
GFR SERPL CREATININE-BSD FRML MDRD: 82 ML/MIN/1.73SQ M
GLUCOSE P FAST SERPL-MCNC: 104 MG/DL (ref 65–99)
HCT VFR BLD AUTO: 45.1 % (ref 36.5–49.3)
HGB BLD-MCNC: 14.9 G/DL (ref 12–17)
IMM GRANULOCYTES # BLD AUTO: 0.03 THOUSAND/UL (ref 0–0.2)
IMM GRANULOCYTES NFR BLD AUTO: 1 % (ref 0–2)
LYMPHOCYTES # BLD AUTO: 2 THOUSANDS/ÂΜL (ref 0.6–4.47)
LYMPHOCYTES NFR BLD AUTO: 34 % (ref 14–44)
MCH RBC QN AUTO: 29.9 PG (ref 26.8–34.3)
MCHC RBC AUTO-ENTMCNC: 33 G/DL (ref 31.4–37.4)
MCV RBC AUTO: 90 FL (ref 82–98)
MONOCYTES # BLD AUTO: 0.52 THOUSAND/ÂΜL (ref 0.17–1.22)
MONOCYTES NFR BLD AUTO: 9 % (ref 4–12)
NEUTROPHILS # BLD AUTO: 3.14 THOUSANDS/ÂΜL (ref 1.85–7.62)
NEUTS SEG NFR BLD AUTO: 53 % (ref 43–75)
NRBC BLD AUTO-RTO: 0 /100 WBCS
PLATELET # BLD AUTO: 230 THOUSANDS/UL (ref 149–390)
PMV BLD AUTO: 10.4 FL (ref 8.9–12.7)
POTASSIUM SERPL-SCNC: 4.4 MMOL/L (ref 3.5–5.3)
PROT SERPL-MCNC: 6.9 G/DL (ref 6.4–8.4)
PSA SERPL-MCNC: 0.7 NG/ML (ref 0–4)
RBC # BLD AUTO: 4.99 MILLION/UL (ref 3.88–5.62)
SODIUM SERPL-SCNC: 140 MMOL/L (ref 135–147)
WBC # BLD AUTO: 5.81 THOUSAND/UL (ref 4.31–10.16)

## 2022-12-02 LAB
TESTOST FREE SERPL-MCNC: 16.2 PG/ML (ref 6.8–21.5)
TESTOST SERPL-MCNC: 586 NG/DL (ref 264–916)

## 2022-12-02 NOTE — PROGRESS NOTES
Established Patient Progress Note      Chief Complaint   Patient presents with   • Hypogonadism   • Vitamin D Deficiency        History of Present Illness:   Brina Garcia is a 38 y o  male with hypogonadism, HLD, and vitamin d deficiency  Last seen in the office 6/8/22  For the hypogonadism he is currently taking androgel  He feels well on current dose with no adverse reactions  For the HLD he is not currently on statin  He is focusing on lifestyle modifications  For the vitamin d deficiency he is taking 5,000 units daily  Patient Active Problem List   Diagnosis   • Abnormal results of liver function studies   • Adult stuttering   • Testicular hypogonadism   • Vitamin D deficiency   • BMI 25 0-25 9,adult   • Mixed hyperlipidemia   • Psychophysiological insomnia   • Asymptomatic reticular veins 1 mm to 3 mm in diameter   • Asymptomatic spider veins of both lower extremities   • JASMINE (generalized anxiety disorder)      Past Medical History:   Diagnosis Date   • Hypogonadism in male       Past Surgical History:   Procedure Laterality Date   • TOOTH EXTRACTION     • WISDOM TOOTH EXTRACTION        Family History   Problem Relation Age of Onset   • Multiple sclerosis Mother      Social History     Tobacco Use   • Smoking status: Never   • Smokeless tobacco: Never   Substance Use Topics   • Alcohol use: Yes     Comment: social     No Known Allergies      Current Outpatient Medications:   •  Cholecalciferol (Vitamin D3) 125 MCG (5000 UT) CAPS, Take by mouth, Disp: , Rfl:   •  testosterone (ANDROGEL) 1 62 % TD gel pump, APPLY 2 ACTUATION (40 5 MG TOTAL) TOPICALLY EVERY MORNING, Disp: 75 g, Rfl: 2  •  traZODone (DESYREL) 100 mg tablet, Take 1 tablet (100 mg total) by mouth daily at bedtime take 1 tablet by mouth at bedtime, Disp: 90 tablet, Rfl: 3    Review of Systems   Constitutional: Negative for activity change, appetite change, chills, diaphoresis, fatigue, fever and unexpected weight change     Respiratory: Negative for chest tightness and shortness of breath  Cardiovascular: Negative for chest pain, palpitations and leg swelling  Gastrointestinal: Negative for abdominal pain, nausea and vomiting  Genitourinary: Negative for difficulty urinating  Neurological: Negative for dizziness, weakness and light-headedness  All other systems reviewed and are negative  Physical Exam:  Body mass index is 30 65 kg/m²  /80   Pulse (!) 54   Ht 5' 8" (1 727 m)   Wt 91 4 kg (201 lb 9 6 oz)   BMI 30 65 kg/m²    Wt Readings from Last 3 Encounters:   12/05/22 91 4 kg (201 lb 9 6 oz)   06/08/22 87 kg (191 lb 12 8 oz)   01/24/22 88 kg (194 lb)       Physical Exam  Vitals reviewed  Constitutional:       Appearance: Normal appearance  HENT:      Head: Normocephalic  Cardiovascular:      Rate and Rhythm: Normal rate and regular rhythm  Pulses: Normal pulses  Heart sounds: Normal heart sounds  No murmur heard  Pulmonary:      Effort: No respiratory distress  Breath sounds: Normal breath sounds  No wheezing or rhonchi  Neurological:      Mental Status: He is alert and oriented to person, place, and time  Psychiatric:         Mood and Affect: Mood normal          Behavior: Behavior normal          Thought Content:  Thought content normal          Judgment: Judgment normal        Labs:     Component      Latest Ref Rng & Units 11/30/2022   WBC      4 31 - 10 16 Thousand/uL 5 81   Red Blood Cell Count      3 88 - 5 62 Million/uL 4 99   Hemoglobin      12 0 - 17 0 g/dL 14 9   HCT      36 5 - 49 3 % 45 1   MCV      82 - 98 fL 90   MCH      26 8 - 34 3 pg 29 9   MCHC      31 4 - 37 4 g/dL 33 0   RDW      11 6 - 15 1 % 13 2   MPV      8 9 - 12 7 fL 10 4   Platelet Count      255 - 390 Thousands/uL 230   nRBC      /100 WBCs 0   Neutrophils %      43 - 75 % 53   Immat GRANS %      0 - 2 % 1   Lymphocytes Relative      14 - 44 % 34   Monocytes Relative      4 - 12 % 9   Eosinophils      0 - 6 % 2 Basophils Relative      0 - 1 % 1   Absolute Neutrophils      1 85 - 7 62 Thousands/µL 3 14   Immature Grans Absolute      0 00 - 0 20 Thousand/uL 0 03   Lymphocytes Absolute      0 60 - 4 47 Thousands/µL 2 00   Absolute Monocytes      0 17 - 1 22 Thousand/µL 0 52   Absolute Eosinophils      0 00 - 0 61 Thousand/µL 0 09   Basophils Absolute      0 00 - 0 10 Thousands/µL 0 03   Sodium      135 - 147 mmol/L 140   Potassium      3 5 - 5 3 mmol/L 4 4   Chloride      96 - 108 mmol/L 107   CO2      21 - 32 mmol/L 25   Anion Gap      4 - 13 mmol/L 8   BUN      5 - 25 mg/dL 21   Creatinine      0 60 - 1 30 mg/dL 1 09   GLUCOSE FASTING      65 - 99 mg/dL 104 (H)   Calcium      8 3 - 10 1 mg/dL 9 2   AST      5 - 45 U/L 40   ALT      12 - 78 U/L 31   Alkaline Phosphatase      46 - 116 U/L 61   Total Protein      6 4 - 8 4 g/dL 6 9   Albumin      3 5 - 5 0 g/dL 3 6   TOTAL BILIRUBIN      0 20 - 1 00 mg/dL 1 12 (H)   eGFR      ml/min/1 73sq m 82   TESTOSTERONE FREE      6 8 - 21 5 pg/mL 16 2   Testosterone, Total, LC/MS      264 - 916 ng/dL 586   PSA, Total      0 0 - 4 0 ng/mL 0 7   Vit D, 25-Hydroxy      30 0 - 100 0 ng/mL 50 9       Component      Latest Ref Rng & Units 8/20/2021   Cholesterol      50 - 200 mg/dL 267 (H)   Triglycerides      <=150 mg/dL 203 (H)   HDL      >=40 mg/dL 53   LDL Calculated      0 - 100 mg/dL 173 (H)   Non-HDL Cholesterol      mg/dl 214       Impression & Plan:    Problem List Items Addressed This Visit        Endocrine    Testicular hypogonadism - Primary     He is tolerating Androgel without any issues  Total and free testosterone levels within normal limits  PSA and CBC also within normal range  Continue Androgel at current dose  Will repeat total and free testosterone prior to next appointment  Relevant Orders    Testosterone, free, total    CBC and differential Lab Collect    Comprehensive metabolic panel       Other    Vitamin D deficiency     Vitamin d level within normal range  Continue supplementation  Relevant Orders    Vitamin D 25 hydroxy    Mixed hyperlipidemia     He is not currently on statin therapy  Will repeat lipid panel  Relevant Orders    Lipid panel       Orders Placed This Encounter   Procedures   • Lipid panel     This is a patient instruction: This test requires patient fasting for 10-12 hours or longer  Drinking of black coffee or black tea is acceptable  Standing Status:   Future     Number of Occurrences:   1     Standing Expiration Date:   12/5/2023   • Vitamin D 25 hydroxy     Standing Status:   Future     Standing Expiration Date:   12/5/2023   • Testosterone, free, total     This is a patient instruction: Fasting preferred  Collections for men not undergoing treatment must be completed between 7am-9am ONLY  Collection time restrictions are not applicable to women or men already undergoing treatment  Standing Status:   Future     Number of Occurrences:   1     Standing Expiration Date:   12/5/2023   • CBC and differential Lab Collect     This is a patient instruction: This test is non-fasting  Please drink two glasses of water morning of bloodwork  Standing Status:   Future     Number of Occurrences:   1     Standing Expiration Date:   12/5/2023   • Comprehensive metabolic panel     This is a patient instruction: Patient fasting for 8 hours or longer recommended  Standing Status:   Future     Number of Occurrences:   1     Standing Expiration Date:   12/5/2023       There are no Patient Instructions on file for this visit  Discussed with the patient and all questioned fully answered  He will call me if any problems arise      RL Avalos

## 2022-12-05 ENCOUNTER — OFFICE VISIT (OUTPATIENT)
Dept: ENDOCRINOLOGY | Facility: CLINIC | Age: 42
End: 2022-12-05

## 2022-12-05 VITALS
DIASTOLIC BLOOD PRESSURE: 80 MMHG | HEART RATE: 54 BPM | WEIGHT: 201.6 LBS | SYSTOLIC BLOOD PRESSURE: 140 MMHG | HEIGHT: 68 IN | BODY MASS INDEX: 30.55 KG/M2

## 2022-12-05 DIAGNOSIS — E55.9 VITAMIN D DEFICIENCY: ICD-10-CM

## 2022-12-05 DIAGNOSIS — E78.2 MIXED HYPERLIPIDEMIA: ICD-10-CM

## 2022-12-05 DIAGNOSIS — E29.1 TESTICULAR HYPOGONADISM: Primary | ICD-10-CM

## 2022-12-05 NOTE — ASSESSMENT & PLAN NOTE
He is tolerating Androgel without any issues  Total and free testosterone levels within normal limits  PSA and CBC also within normal range  Continue Androgel at current dose  Will repeat total and free testosterone prior to next appointment

## 2022-12-08 DIAGNOSIS — F51.01 PRIMARY INSOMNIA: ICD-10-CM

## 2022-12-08 DIAGNOSIS — E29.1 TESTICULAR HYPOGONADISM: ICD-10-CM

## 2022-12-08 RX ORDER — TESTOSTERONE 16.2 MG/G
40.5 GEL TRANSDERMAL EVERY MORNING
Qty: 75 G | Refills: 0 | Status: SHIPPED | OUTPATIENT
Start: 2022-12-08

## 2022-12-08 RX ORDER — TRAZODONE HYDROCHLORIDE 100 MG/1
100 TABLET ORAL
Qty: 90 TABLET | Refills: 0 | Status: SHIPPED | OUTPATIENT
Start: 2022-12-08

## 2022-12-22 ENCOUNTER — AMB VIDEO VISIT (OUTPATIENT)
Dept: OTHER | Facility: HOSPITAL | Age: 42
End: 2022-12-22

## 2022-12-22 VITALS — OXYGEN SATURATION: 16 %

## 2022-12-22 NOTE — PROGRESS NOTES
Patient having increased stress and anxiety  Attempted to do a video visit in hope to restart welbutrin or another medication  Unable to restart these medications on this type of visit  Visit was canceled  Patient did denied any thoughts of self harm or harming anyone else

## 2023-01-09 ENCOUNTER — OFFICE VISIT (OUTPATIENT)
Dept: FAMILY MEDICINE CLINIC | Facility: CLINIC | Age: 43
End: 2023-01-09

## 2023-01-09 VITALS
BODY MASS INDEX: 30.34 KG/M2 | DIASTOLIC BLOOD PRESSURE: 60 MMHG | OXYGEN SATURATION: 98 % | RESPIRATION RATE: 16 BRPM | SYSTOLIC BLOOD PRESSURE: 110 MMHG | HEIGHT: 68 IN | TEMPERATURE: 97.5 F | HEART RATE: 63 BPM | WEIGHT: 200.2 LBS

## 2023-01-09 DIAGNOSIS — E55.9 VITAMIN D DEFICIENCY: ICD-10-CM

## 2023-01-09 DIAGNOSIS — E29.1 TESTICULAR HYPOGONADISM: ICD-10-CM

## 2023-01-09 DIAGNOSIS — Z12.5 ENCOUNTER FOR PROSTATE CANCER SCREENING: ICD-10-CM

## 2023-01-09 DIAGNOSIS — E78.2 MIXED HYPERLIPIDEMIA: ICD-10-CM

## 2023-01-09 DIAGNOSIS — F41.1 GAD (GENERALIZED ANXIETY DISORDER): Primary | ICD-10-CM

## 2023-01-09 RX ORDER — SERTRALINE HYDROCHLORIDE 25 MG/1
25 TABLET, FILM COATED ORAL DAILY
Qty: 30 TABLET | Refills: 1 | Status: SHIPPED | OUTPATIENT
Start: 2023-01-09

## 2023-01-09 RX ORDER — PROPRANOLOL HYDROCHLORIDE 10 MG/1
10 TABLET ORAL DAILY PRN
Qty: 10 TABLET | Refills: 0 | Status: SHIPPED | OUTPATIENT
Start: 2023-01-09

## 2023-01-09 NOTE — PROGRESS NOTES
Name: Alesia Toth      : 1980      MRN: [id number]  Encounter Provider: RL Bernal  Encounter Date: 2023   Encounter department: [facility]    Assessment & Plan     1  JASMINE (generalized anxiety disorder)  -     sertraline (Zoloft) 25 mg tablet; Take 1 tablet (25 mg total) by mouth daily  -     propranolol (INDERAL) 10 mg tablet; Take 1 tablet (10 mg total) by mouth daily as needed (anxiety/public speaking)  -     CBC and differential; Future  -     Comprehensive metabolic panel; Future  -     CBC and differential  -     Comprehensive metabolic panel    2  Vitamin D deficiency  -     Vitamin D 25 hydroxy; Future  -     Vitamin D 25 hydroxy    3  Mixed hyperlipidemia  -     Lipid panel; Future  -     TSH, 3rd generation with Free T4 reflex; Future  -     Lipid panel  -     TSH, 3rd generation with Free T4 reflex    4  Testicular hypogonadism  -     Testosterone, free, total; Future  -     Testosterone, free, total    5  Encounter for prostate cancer screening  -     PSA, Total Screen; Future  -     PSA, Total Screen  Will start zoloft 25 mg daily  Advised on medication administration and side effects  Can use Inderal 10 mg daily PRN prior to public speaking to manage anxiety symptoms  Labs ordered to be done 2023  Recheck in 4-6 weeks for anxiety  Please call the office if you are experiencing any worsening of symptoms or no symptom improvement  Subjective      Here to re-establish care and discuss anxiety  He states since 2022 he has had increased stress and anxiety  He has been trying to manage this on his own  In the past he was on buspar but he feels it didn't help him that much  He tolerated it well  He is interested in something daily  He states when it comes to presentations/ public speaking it's worse and it worsens his stutter  Review of Systems   Constitutional: Negative for chills and fever     Eyes: Negative for discharge  Respiratory: Negative for shortness of breath  Cardiovascular: Negative for chest pain  Gastrointestinal: Negative for constipation and diarrhea  Genitourinary: Negative for difficulty urinating  Musculoskeletal: Negative for joint swelling  Skin: Negative for rash  Neurological: Negative for headaches  Hematological: Negative for adenopathy  Psychiatric/Behavioral: The patient is nervous/anxious  Current Outpatient Medications on File Prior to Visit   Medication Sig   • Cholecalciferol (Vitamin D3) 125 MCG (5000 UT) CAPS Take by mouth   • testosterone (ANDROGEL) 1 62 % TD gel pump Apply 2 actuation (40 5 mg total) topically every morning   • traZODone (DESYREL) 100 mg tablet Take 1 tablet (100 mg total) by mouth daily at bedtime take 1 tablet by mouth at bedtime       Objective     /60   Pulse 63   Temp 97 5 °F (36 4 °C) (Temporal)   Resp 16   Ht 5' 8 11" (1 73 m)   Wt 90 8 kg (200 lb 3 2 oz)   SpO2 98%   BMI 30 34 kg/m²     Physical Exam  Vitals and nursing note reviewed  Constitutional:       General: He is not in acute distress  Appearance: He is well-developed  He is obese  He is not diaphoretic  HENT:      Head: Normocephalic and atraumatic  Right Ear: External ear normal       Left Ear: External ear normal    Eyes:      General: Lids are normal          Right eye: No discharge  Left eye: No discharge  Conjunctiva/sclera: Conjunctivae normal    Cardiovascular:      Rate and Rhythm: Normal rate and regular rhythm  Heart sounds: No murmur heard  Pulmonary:      Effort: Pulmonary effort is normal  No respiratory distress  Breath sounds: Normal breath sounds  No wheezing  Musculoskeletal:         General: No deformity  Cervical back: Neck supple  Skin:     General: Skin is warm and dry  Neurological:      Mental Status: He is alert and oriented to person, place, and time     Psychiatric:         Speech: Speech normal  Behavior: Behavior normal          Thought Content:  Thought content normal          Judgment: Judgment normal        RL Puente

## 2023-01-09 NOTE — PATIENT INSTRUCTIONS
Start zoloft 25 mg daily  You can use the inderal (propranolol) 1 hour as needed prior to public speaking  Follow up in 4-6 weeks for re-check  Please call the office if you are experiencing any worsening of symptoms or no symptom improvement

## 2023-01-17 DIAGNOSIS — E29.1 TESTICULAR HYPOGONADISM: ICD-10-CM

## 2023-01-19 RX ORDER — TESTOSTERONE 16.2 MG/G
40.5 GEL TRANSDERMAL EVERY MORNING
Qty: 75 G | Refills: 0 | Status: SHIPPED | OUTPATIENT
Start: 2023-01-19

## 2023-01-26 ENCOUNTER — RA CDI HCC (OUTPATIENT)
Dept: OTHER | Facility: HOSPITAL | Age: 43
End: 2023-01-26

## 2023-01-26 NOTE — PROGRESS NOTES
[facility]  coding opportunities       Chart reviewed, no opportunity found: CHART REVIEWED, NO OPPORTUNITY FOUND        Patients Insurance        Commercial Insurance: [address] [place] [address]

## 2023-01-30 ENCOUNTER — TELEPHONE (OUTPATIENT)
Dept: FAMILY MEDICINE CLINIC | Facility: CLINIC | Age: 43
End: 2023-01-30

## 2023-01-30 DIAGNOSIS — F51.01 PRIMARY INSOMNIA: ICD-10-CM

## 2023-01-30 DIAGNOSIS — F41.1 GAD (GENERALIZED ANXIETY DISORDER): ICD-10-CM

## 2023-01-30 NOTE — TELEPHONE ENCOUNTER
----- Message from Carlos Matta sent at 1/30/2023  7:41 AM EST -----  Regarding: FW: Refill  Contact: [phone]    ----- Message -----  From: Komal Reynoso  Sent: 1/29/2023   8:41 PM EST  To: [facility]  Subject: Refill                                           Hi   Can you refill my sertraline to 50mg and can I increase my trazodone to 200mg?

## 2023-01-31 ENCOUNTER — TELEPHONE (OUTPATIENT)
Dept: FAMILY MEDICINE CLINIC | Facility: CLINIC | Age: 43
End: 2023-01-31

## 2023-01-31 DIAGNOSIS — F51.01 PRIMARY INSOMNIA: ICD-10-CM

## 2023-01-31 RX ORDER — TRAZODONE HYDROCHLORIDE 100 MG/1
150 TABLET ORAL
Qty: 135 TABLET | Refills: 0 | Status: SHIPPED | OUTPATIENT
Start: 2023-01-31 | End: 2023-05-01

## 2023-01-31 NOTE — TELEPHONE ENCOUNTER
----- Message from Royal Debby sent at 1/30/2023  3:55 PM EST -----  Regarding: FW: Refill  Contact: [phone]    ----- Message -----  From: Pushpa Blankenship  Sent: 1/30/2023   3:50 PM EST  To: [facility]  Subject: Refquinn Pineda, I am taking 100mg Trazodone  Every  morning I wake up at 4AM and I have a difficulty going back asleep  I am hoping that an increased dose will help me sleep though the night  What do you think?

## 2023-02-02 ENCOUNTER — OFFICE VISIT (OUTPATIENT)
Dept: FAMILY MEDICINE CLINIC | Facility: CLINIC | Age: 43
End: 2023-02-02

## 2023-02-02 VITALS
WEIGHT: 197 LBS | SYSTOLIC BLOOD PRESSURE: 118 MMHG | DIASTOLIC BLOOD PRESSURE: 62 MMHG | OXYGEN SATURATION: 98 % | TEMPERATURE: 97.4 F | RESPIRATION RATE: 16 BRPM | HEIGHT: 69 IN | BODY MASS INDEX: 29.18 KG/M2 | HEART RATE: 58 BPM

## 2023-02-02 DIAGNOSIS — F51.04 PSYCHOPHYSIOLOGICAL INSOMNIA: ICD-10-CM

## 2023-02-02 DIAGNOSIS — F41.1 GAD (GENERALIZED ANXIETY DISORDER): Primary | ICD-10-CM

## 2023-02-02 NOTE — PATIENT INSTRUCTIONS
Continue with Zoloft 50 mg daily  In about 1 week can increase to 100 mg daily if you'd like  Give me an update in a 4 weeks  Continue with Trazodone 150 mg at bedtime  Make office visit for November for well exam      Please call the office if you are experiencing any worsening of symptoms or no symptom improvement

## 2023-02-02 NOTE — PROGRESS NOTES
Name: Rudy Wright      : 1980      MRN: [id number]  Encounter Provider: RL Medley  Encounter Date: 2023   Encounter department: [facility]    Assessment & Plan     1  JASMINE (generalized anxiety disorder)    2  Psychophysiological insomnia  Continue with 50 mg zoloft and give me update in 2-3 weeks  If needed, we can increase to 100 mg daily  Continue with 150 mg trazodone at bedtime  Continue with Inderal PRN, advised HR must be monitored while on this medication  If HR not bradycardic, could try higher dose of Inderal if needed  Will have office visit in November for physical and labs to be done prior  Follow up if needed prior  Please call the office if you are experiencing any worsening of symptoms or no symptom improvement  Subjective      Here for follow up since starting Zoloft  He recently increase to 50 mg zoloft about 1 week ago  He has noticed improvement in anxiety and overall feeling a lot better  He states he hasn't noticed a large difference in his speech  He hasn't had any side effects of medication  He only took Inderal once and hasn't noticed a large difference with it  Recently increased trazodone to 150 mg at bedtime  Still waking up at times  Review of Systems   Constitutional: Negative for chills and fever  Eyes: Negative for discharge  Respiratory: Negative for shortness of breath  Cardiovascular: Negative for chest pain  Gastrointestinal: Negative for constipation and diarrhea  Genitourinary: Negative for difficulty urinating  Musculoskeletal: Negative for joint swelling  Skin: Negative for rash  Neurological: Negative for headaches  Hematological: Negative for adenopathy  Psychiatric/Behavioral: The patient is nervous/anxious          Current Outpatient Medications on File Prior to Visit   Medication Sig   • Cholecalciferol (Vitamin D3) 125 MCG (5000 UT) CAPS Take by mouth   • propranolol (INDERAL) 10 mg tablet Take 1 tablet (10 mg total) by mouth daily as needed (anxiety/public speaking)   • sertraline (Zoloft) 50 mg tablet Take 1 tablet (50 mg total) by mouth daily   • testosterone (ANDROGEL) 1 62 % TD gel pump Apply 2 actuation (40 5 mg total) topically every morning   • traZODone (DESYREL) 100 mg tablet Take 1 5 tablets (150 mg total) by mouth daily at bedtime take 1 tablet by mouth at bedtime       Objective     /62   Pulse 58   Temp (!) 97 4 °F (36 3 °C) (Temporal)   Resp 16   Ht 5' 9" (1 753 m)   Wt 89 4 kg (197 lb)   SpO2 98%   BMI 29 09 kg/m²     Physical Exam  Vitals and nursing note reviewed  Constitutional:       General: He is not in acute distress  Appearance: He is well-developed  He is not diaphoretic  HENT:      Head: Normocephalic and atraumatic  Right Ear: External ear normal       Left Ear: External ear normal    Eyes:      General: Lids are normal          Right eye: No discharge  Left eye: No discharge  Conjunctiva/sclera: Conjunctivae normal    Pulmonary:      Effort: Pulmonary effort is normal    Musculoskeletal:         General: No deformity  Cervical back: Neck supple  Skin:     General: Skin is warm and dry  Neurological:      Mental Status: He is alert and oriented to person, place, and time  Psychiatric:         Speech: Speech normal          Behavior: Behavior normal          Thought Content:  Thought content normal          Judgment: Judgment normal        RL Love

## 2023-03-06 ENCOUNTER — OFFICE VISIT (OUTPATIENT)
Dept: FAMILY MEDICINE CLINIC | Facility: CLINIC | Age: 43
End: 2023-03-06

## 2023-03-06 VITALS
WEIGHT: 196.4 LBS | RESPIRATION RATE: 16 BRPM | HEIGHT: 68 IN | DIASTOLIC BLOOD PRESSURE: 80 MMHG | TEMPERATURE: 96.3 F | HEART RATE: 48 BPM | BODY MASS INDEX: 29.77 KG/M2 | SYSTOLIC BLOOD PRESSURE: 128 MMHG | OXYGEN SATURATION: 97 %

## 2023-03-06 DIAGNOSIS — K21.9 GASTROESOPHAGEAL REFLUX DISEASE, UNSPECIFIED WHETHER ESOPHAGITIS PRESENT: ICD-10-CM

## 2023-03-06 DIAGNOSIS — R07.9 RIGHT-SIDED CHEST PAIN: Primary | ICD-10-CM

## 2023-03-06 RX ORDER — OMEPRAZOLE 20 MG/1
20 CAPSULE, DELAYED RELEASE ORAL
Qty: 30 CAPSULE | Refills: 0 | Status: SHIPPED | OUTPATIENT
Start: 2023-03-06 | End: 2023-09-02

## 2023-03-06 NOTE — PATIENT INSTRUCTIONS
Here for right sided chest pain for last month and would rec elevating head of bed and trial of gerd med and may use pepcid in pm prn gerd symptoms  May use omeprazole in am for the next 2 weeks  There may be some reflux disease  Consult GI to r/o hiatal hernia and gerd symptoms  This occurs in am for last month and disappears after 2 months  Patient has had more loos stools recently  Recently seen in ER at [address] [place] [address] [address]

## 2023-03-06 NOTE — PROGRESS NOTES
Name: Carrie Lewis      : 1980      MRN: [id number]  Encounter Provider: Anastacio Motta DO  Encounter Date: 3/6/2023   Encounter department: [address] [place] [address]  Chief Complaint   Patient presents with   • Chest Pain     Pt states he has been having chest pain for the past month states is only in the morning  Pt was seen at ED last week for chest pain and had EKG done      Patient Instructions   Here for right sided chest pain for last month and would rec elevating head of bed and trial of gerd med and may use pepcid in pm prn gerd symptoms  May use omeprazole in am for the next 2 weeks  There may be some reflux disease  Consult GI to r/o hiatal hernia and gerd symptoms  This occurs in am for last month and disappears after 2 months  Patient has had more loos stools recently  Recently seen in ER at [address] [place] [address] [address]  Assessment & Plan     1  Right-sided chest pain    2  Gastroesophageal reflux disease, unspecified whether esophagitis present           Subjective      Chest Pain (Pt states he has been having chest pain for the past month states is only in the morning  Pt was seen at ED last week for chest pain and had EKG done )    Lifting weights does not make it worse, waking up is only time it hurts and went to ER as well recently  ER told them to take Tylenol or motrin which does not help, last 2 days was much better since recently  Looking down makes it worse  When waking is only time it occurs and last for 2 hours  No hx of gerd  Patient with some burning in chest recently and stool is very soft diarrhea and has a lot of gas and did not change diet  Patient does not take any gerd best      Review of Systems   Constitutional: Negative  HENT: Negative  Eyes: Negative  Respiratory: Negative  Cardiovascular: Positive for chest pain (right sided )  Gastrointestinal: Negative  Negative for abdominal pain, blood in stool, constipation and vomiting          Bobbetta Grise today, passing gas more frequently, no rectal bleeding   Endocrine: Negative  Genitourinary: Negative  Musculoskeletal: Negative  Skin: Negative  Allergic/Immunologic: Negative  Neurological: Negative  Hematological: Negative  Psychiatric/Behavioral: Negative  Current Outpatient Medications on File Prior to Visit   Medication Sig   • Cholecalciferol (Vitamin D3) 125 MCG (5000 UT) CAPS Take by mouth   • propranolol (INDERAL) 10 mg tablet Take 1 tablet (10 mg total) by mouth daily as needed (anxiety/public speaking)   • sertraline (Zoloft) 50 mg tablet Take 1 tablet (50 mg total) by mouth daily   • testosterone (ANDROGEL) 1 62 % TD gel pump Apply 2 actuation (40 5 mg total) topically every morning   • traZODone (DESYREL) 100 mg tablet Take 1 5 tablets (150 mg total) by mouth daily at bedtime take 1 tablet by mouth at bedtime       Objective     /80 (BP Location: Left arm, Patient Position: Sitting, Cuff Size: Adult)   Pulse (!) 48   Temp (!) 96 3 °F (35 7 °C) (Temporal)   Resp 16   Ht 5' 8" (1 727 m)   Wt 89 1 kg (196 lb 6 4 oz)   SpO2 97%   BMI 29 86 kg/m²     Physical Exam  Constitutional:       Appearance: He is well-developed  HENT:      Head: Normocephalic and atraumatic  Right Ear: External ear normal       Left Ear: External ear normal       Nose: Nose normal    Eyes:      Conjunctiva/sclera: Conjunctivae normal       Pupils: Pupils are equal, round, and reactive to light  Cardiovascular:      Rate and Rhythm: Normal rate and regular rhythm  Heart sounds: Normal heart sounds  Pulmonary:      Effort: Pulmonary effort is normal       Breath sounds: Normal breath sounds  Abdominal:      General: Bowel sounds are normal       Palpations: Abdomen is soft  Musculoskeletal:         General: Normal range of motion  Cervical back: Normal range of motion and neck supple  Skin:     General: Skin is warm and dry  Capillary Refill: Capillary refill takes less than 2 seconds  Neurological:      General: No focal deficit present  Mental Status: He is alert and oriented to person, place, and time  Deep Tendon Reflexes: Reflexes are normal and symmetric     Psychiatric:         Mood and Affect: Mood normal          Behavior: Behavior normal        Niurka Pike DO

## 2023-03-15 ENCOUNTER — VBI (OUTPATIENT)
Dept: FAMILY MEDICINE CLINIC | Facility: CLINIC | Age: 43
End: 2023-03-15

## 2023-03-15 DIAGNOSIS — E29.1 TESTICULAR HYPOGONADISM: ICD-10-CM

## 2023-03-15 RX ORDER — TESTOSTERONE 16.2 MG/G
40.5 GEL TRANSDERMAL EVERY MORNING
Qty: 75 G | Refills: 0 | Status: SHIPPED | OUTPATIENT
Start: 2023-03-15

## 2023-03-15 NOTE — TELEPHONE ENCOUNTER
Requested medication(s) are due for refill today: Yes  Patient has already received a courtesy refill: No  Other reason request has been forwarded to provider: Controlled substance medication

## 2023-03-15 NOTE — TELEPHONE ENCOUNTER
ED Visit Information     Ed visit date: 2-20-23  Diagnosis Description: chest pain  In Network? No  Discharge status: Home  Discharged with meds ? No  Number of ED visits to date: 1  ED Severity:5     Outreach Information    Outreach successful: Yes 1  Date letter mailed:  Date Finalized:3-15-23    Care Coordination    Follow up appointment with pcp: yes yes  Transportation issues ?  No    [employer] Outreach    Emergent necessity warranted by diagnosis:  Yes  [facility] PCP:  Yes  Transportation:  Friend/Family Transport  Called PCP first?:  No  Feels able to call PCP for urgent problems ?:  Yes  Understands what emergencies can be handled by PCP ?:  Yes  Ever any problems getting appointment with PCP for minor emergency/urgency problems?:  No  Practice Contacted Patient ?:  No  Pt had ED follow up with pcp/staff ?:  No    Seen for follow-up out of network ?:  No  Urgent care Education?:  Yes

## 2023-03-23 ENCOUNTER — CONSULT (OUTPATIENT)
Dept: GASTROENTEROLOGY | Facility: CLINIC | Age: 43
End: 2023-03-23

## 2023-03-23 VITALS
TEMPERATURE: 98.1 F | BODY MASS INDEX: 29.83 KG/M2 | HEIGHT: 68 IN | SYSTOLIC BLOOD PRESSURE: 130 MMHG | WEIGHT: 196.8 LBS | DIASTOLIC BLOOD PRESSURE: 70 MMHG

## 2023-03-23 DIAGNOSIS — K21.9 GASTROESOPHAGEAL REFLUX DISEASE, UNSPECIFIED WHETHER ESOPHAGITIS PRESENT: ICD-10-CM

## 2023-03-23 DIAGNOSIS — R07.9 RIGHT-SIDED CHEST PAIN: Primary | ICD-10-CM

## 2023-03-23 NOTE — PROGRESS NOTES
[facility] - Outpatient Consultation  Kelly Clemons 38 y o  male MRN: [id number]  Encounter: [id number]          ASSESSMENT AND PLAN:      1  Right-sided chest pain    - Ambulatory Referral to Gastroenterology  - FL barium swallow; Future    2  Gastroesophageal reflux disease, unspecified whether esophagitis present    - Ambulatory Referral to Gastroenterology  - FL barium swallow; Future    15-year-old pleasant male referred for evaluation of right-sided chest pain and GERD  His primary complaint is dull discomfort on the right side of his chest upon waking in the morning  The pain is worse with bending his chin to his chest and during periods of time when he is exercising (he does upper body workouts), but he denies pain during exercise  He denies pain after eating and also denies heartburn, regurgitation, dysphagia, dyspepsia symptoms  He also had no improvement in discomfort after taking Prilosec daily for 3 weeks  For these reasons, I doubt his chest discomfort is due to reflux  I explained we can obtain barium esophagram to assess for evidence of esophagitis, hiatal hernia, motility disorder but his symptoms do not necessarily align with this  I will call him with the results  I told him he may remain off Prilosec due to side effect of severe diarrhea  If barium esophagram is unremarkable, I would recommend following up with PCP as this sounds like musculoskeletal pain  Follow-up as needed  ______________________________________________________________________    HPI: 15-year-old male referred for evaluation of right-sided chest pain and GERD  He describes onset of right-sided chest discomfort about 2 months ago  He describes this as dull discomfort which is nonradiating and located in the right side of his chest  He notices the discomfort in the morning after he wakes up  It lasts about 1 to 2 hours then goes away, or he is distracted at work and does not notice it    He states the pain seems worse during periods of time when he is exercising and also if he looks downward, bringing his chin to his chest  He denies pain during exercise, palpitations, shortness of breath  He is very physically active and mostly does upper body workouts  He denies chest pain associated with eating  He denies heartburn, regurgitation, dysphagia, nausea, vomiting, belching, bloating, early satiety, abdominal pain  Due to concern for GERD, his PCP started Prilosec which he took for about 3 weeks then stopped due to "explosive diarrhea"  After he stopped the Prilosec, his diarrhea resolved  REVIEW OF SYSTEMS:    CONSTITUTIONAL: Denies any fever, chills, rigors, and weight loss  HEENT: No earache or tinnitus  Denies hearing loss or visual disturbances  CARDIOVASCULAR: No chest pain or palpitations  RESPIRATORY: Denies any cough, hemoptysis, shortness of breath or dyspnea on exertion  GASTROINTESTINAL: As noted in the History of Present Illness  GENITOURINARY: No problems with urination  Denies any hematuria or dysuria  NEUROLOGIC: No dizziness or vertigo, denies headaches  MUSCULOSKELETAL: Denies any muscle or joint pain  SKIN: Denies skin rashes or itching  ENDOCRINE: Denies excessive thirst  Denies intolerance to heat or cold  PSYCHOSOCIAL: Denies depression or anxiety  Denies any recent memory loss         Historical Information   Past Medical History:   Diagnosis Date   • Hypogonadism in male      Past Surgical History:   Procedure Laterality Date   • TOOTH EXTRACTION     • WISDOM TOOTH EXTRACTION       Social History   Social History     Substance and Sexual Activity   Alcohol Use Yes    Comment: social     Social History     Substance and Sexual Activity   Drug Use No     Social History     Tobacco Use   Smoking Status Never   Smokeless Tobacco Never     Family History   Problem Relation Age of Onset   • Multiple sclerosis Mother        Meds/Allergies       Current Outpatient Medications:   •  Cholecalciferol (Vitamin D3) 125 MCG (5000 UT) CAPS  •  omeprazole (PriLOSEC) 20 mg delayed release capsule  •  propranolol (INDERAL) 10 mg tablet  •  sertraline (Zoloft) 50 mg tablet  •  testosterone (ANDROGEL) 1 62 % TD gel pump  •  traZODone (DESYREL) 100 mg tablet    No Known Allergies        Objective     Blood pressure 130/70, temperature 98 1 °F (36 7 °C), temperature source Tympanic, height 5' 8" (1 727 m), weight 89 3 kg (196 lb 12 8 oz)  Body mass index is 29 92 kg/m²  PHYSICAL EXAM:      General Appearance:   Alert, cooperative, no distress   HEENT:   Normocephalic, atraumatic, anicteric      Neck:  Supple, symmetrical, trachea midline   Lungs:   Clear to auscultation bilaterally; no rales, rhonchi or wheezing; respirations unlabored    Heart[de-identified]   Regular rate and rhythm; no murmur, rub, or gallop  Abdomen:   Soft, non-tender, non-distended; normal bowel sounds; no masses, no organomegaly    Genitalia:   Deferred    Rectal:   Deferred    Extremities:  No cyanosis, clubbing or edema    Pulses:  2+ and symmetric    Skin:  No jaundice, rashes, or lesions    Lymph nodes:  No palpable cervical lymphadenopathy        Lab Results:   No visits with results within 1 Day(s) from this visit     Latest known visit with results is:   Appointment on 11/30/2022   Component Date Value   • Testosterone, Free 11/30/2022 16 2    • TESTOSTERONE TOTAL 11/30/2022 586    • Sodium 11/30/2022 140    • Potassium 11/30/2022 4 4    • Chloride 11/30/2022 107    • CO2 11/30/2022 25    • ANION GAP 11/30/2022 8    • BUN 11/30/2022 21    • Creatinine 11/30/2022 1 09    • Glucose, Fasting 11/30/2022 104 (H)    • Calcium 11/30/2022 9 2    • AST 11/30/2022 40    • ALT 11/30/2022 31    • Alkaline Phosphatase 11/30/2022 61    • Total Protein 11/30/2022 6 9    • Albumin 11/30/2022 3 6    • Total Bilirubin 11/30/2022 1 12 (H)    • eGFR 11/30/2022 82    • PSA 11/30/2022 0 7    • WBC 11/30/2022 5 81    • RBC 11/30/2022 4 99    • Hemoglobin 11/30/2022 14 9    • Hematocrit 11/30/2022 45 1    • MCV 11/30/2022 90    • MCH 11/30/2022 29 9    • MCHC 11/30/2022 33 0    • RDW 11/30/2022 13 2    • MPV 11/30/2022 10 4    • Platelets 95/11/4421 230    • nRBC 11/30/2022 0    • Neutrophils Relative 11/30/2022 53    • Immat GRANS % 11/30/2022 1    • Lymphocytes Relative 11/30/2022 34    • Monocytes Relative 11/30/2022 9    • Eosinophils Relative 11/30/2022 2    • Basophils Relative 11/30/2022 1    • Neutrophils Absolute 11/30/2022 3 14    • Immature Grans Absolute 11/30/2022 0 03    • Lymphocytes Absolute 11/30/2022 2 00    • Monocytes Absolute 11/30/2022 0 52    • Eosinophils Absolute 11/30/2022 0 09    • Basophils Absolute 11/30/2022 0 03    • Vit D, 25-Hydroxy 11/30/2022 50 9          Radiology Results:   No results found  Answers for HPI/ROS submitted by the patient on 3/20/2023  Chronicity: new  Onset: more than 1 month ago  Onset quality: gradual  Frequency: daily  Episode duration: 3 Hours  Progression since onset: gradually improving  Pain location: generalized abdominal region  Pain - numeric: 7/10  Pain quality: dull  Radiates to: chest  anorexia: No  arthralgias: No  belching: No  constipation: Yes  diarrhea: Yes  dysuria: No  fever: No  flatus: Yes  frequency: No  headaches: No  hematochezia: No  hematuria: No  melena: No  myalgias: No  nausea:  No  weight loss: No  vomiting: No  Aggravated by: being still  Relieved by: activity

## 2023-03-27 ENCOUNTER — TELEPHONE (OUTPATIENT)
Dept: FAMILY MEDICINE CLINIC | Facility: CLINIC | Age: 43
End: 2023-03-27

## 2023-03-27 DIAGNOSIS — F41.1 GAD (GENERALIZED ANXIETY DISORDER): ICD-10-CM

## 2023-03-27 RX ORDER — SERTRALINE HYDROCHLORIDE 100 MG/1
100 TABLET, FILM COATED ORAL DAILY
Qty: 90 TABLET | Refills: 1 | Status: SHIPPED | OUTPATIENT
Start: 2023-03-27

## 2023-03-27 NOTE — TELEPHONE ENCOUNTER
----- Message from Tiffani Chan sent at 3/27/2023  8:02 AM EDT -----  Regarding: FW: Sertraline  Contact: [phone]    ----- Message -----  From: Maddy Etienne  Sent: 3/27/2023   7:11 AM EDT  To: [facility]  Subject: Sertraline                                       Javier Pineda  Please refill my sertraline prescription at 100mg/day  I will need to  the medication tomorrow  Thank you  Male infant, delivered vaginally [birth year] at 1856 at 39/5 weeks.  Birth weight 7 lb 12.5 oz (3530g).  1 void, 1 stool, four breastfeeding sessions noted in charting since birth.  Mother reports feedings are going well since delivery. Reports infant eating at least every three hours on each breast for about 15 mins on each side.  Reviewed initial breastfeeding packet and book, encouraging frequent feedings at least every three hours and on cue, massage, and positioning and latch on techniques.  Denies questions at this time. Encouraged to call with any issues.     Maternal hx: , ACL repair, T&A  Maternal meds: PNV  Pump: yes, double electric

## 2023-03-30 ENCOUNTER — TELEPHONE (OUTPATIENT)
Dept: FAMILY MEDICINE CLINIC | Facility: CLINIC | Age: 43
End: 2023-03-30

## 2023-03-30 DIAGNOSIS — R07.9 RIGHT-SIDED CHEST PAIN: Primary | ICD-10-CM

## 2023-03-30 DIAGNOSIS — L98.9 SKIN LESION OF LEFT LEG: Primary | ICD-10-CM

## 2023-03-30 NOTE — TELEPHONE ENCOUNTER
Pt called and states he had an appointment with you 2 w ago for chest pain you adviced him to get a consult with GI,he had GI consult   They said his chest pain is not related to GI issues  Pt states GI recommends him to see musculoskeletal doctor  Please advise  Thanks     Pt also would like a referral for Dermatology

## 2023-04-20 ENCOUNTER — OFFICE VISIT (OUTPATIENT)
Age: 43
End: 2023-04-20

## 2023-04-20 VITALS
HEART RATE: 48 BPM | DIASTOLIC BLOOD PRESSURE: 64 MMHG | WEIGHT: 196.6 LBS | SYSTOLIC BLOOD PRESSURE: 123 MMHG | BODY MASS INDEX: 29.8 KG/M2 | HEIGHT: 68 IN | OXYGEN SATURATION: 96 %

## 2023-04-20 DIAGNOSIS — M62.838 MUSCLE SPASM: ICD-10-CM

## 2023-04-20 DIAGNOSIS — M99.02 SEGMENTAL DYSFUNCTION OF THORACIC REGION: Primary | ICD-10-CM

## 2023-04-20 DIAGNOSIS — M99.01 SEGMENTAL DYSFUNCTION OF CERVICAL REGION: ICD-10-CM

## 2023-04-20 NOTE — PROGRESS NOTES
Diagnoses and all orders for this visit:    Segmental dysfunction of thoracic region    Muscle spasm    Segmental dysfunction of cervical region    No red flag, radiculopathy or neurologic deficit appreciated clinically  Pt's symptoms and exam findings consistent with mechanical neck/ubp secondary to repetitive st/sp injury, exacerbated by postural/ergonomic stressors  Pt responded well to stretches and manual mobilization of the affected spinal and myofascial tissues with increased ROM; trial of conservative tx recommended consisting of stretching, ther-ex, graded mobilization/manipulation of the affected spinal/myofascial tissues, postural/ergonomic education and take home stretches/exercises  If symptoms fail to improve with short trial of conservative care, appropriate imaging and referral will be coordinated  Spent greater than 30 min c pt discussing hx, pe, ddx, tx options and reviewing notes/imaging    TREATMENT:  Fear avoidance behavior discussion, encouraged and reassured pt that natural course of condition is to improve over time with adherence to tx plan and home care strategies  Home care recommendations: avoid bed rest, walk (but avoid trails and uneven surfaces), gradual return to activity to tolerance (avoid anything that peripheralizes symptoms), ice 20 min on/off, watch for ice burn, call if symptoms peripheralize, worsen, or neurologic deficit progresses  Ther-ex: IASTM - discussed post procedure soreness and/or ecchymosis for up to 36 hrs, applied to affected mm hypertonicities; wall yoni, axial retraction, upper trap stretch, lev scap stretch, SCM stretch, lat rows with t-band, lat pull down with t-band, abdominal bracing  Thoracic mobilization/manipulation: prone P-A mob, supine A-P manip; cervical mobilization/manipulation: diversified supine graded mobilization, cervical traction, prone C/T amado SHERIDAN    Pt presents for eval and tx for R sided back and chest pain onset Jan 2023   Pt reports awaking with chest pain that has centralized into back over time with use of nsaids and massage gun to shoulder and back region  Pt reports he awakes with symptoms every am but they ease after a couple hrs  Pt reports curling up in sleep  Pt went to ED concerned for heart issue intially; has seen PCP and  for this, who referred here  Pt was referred to G as well but exam was wnl  Pt is active and lifts weights regularly  Back Pain  This is a new problem  The current episode started more than 1 month ago  The problem occurs daily  The problem has been gradually improving since onset  The pain is present in the thoracic spine  The quality of the pain is described as aching and cramping  Pain scale: 0-2/10  Exacerbated by: shoulder presses, sleeping; palliative includes nsaids and massage  Pertinent negatives include no chest pain, dysuria, fever, headaches, numbness or weakness  Juancho Ballesteros is a 38 y o  male   Chief Complaint   Patient presents with   • Neck - Pain, New Patient Visit     Past Medical History:   Diagnosis Date   • Hypogonadism in male         The following portions of the patient's history were reviewed and updated as appropriate: allergies, past family history, past medical history, past social history, past surgical history, and problem list     Review of Systems   Constitutional: Negative for fever and unexpected weight change  HENT: Negative for tinnitus and trouble swallowing  Cardiovascular: Negative for chest pain  Gastrointestinal: Negative for nausea  Genitourinary: Negative for dysuria  Musculoskeletal: Positive for back pain  Neurological: Negative for dizziness, syncope, speech difficulty, weakness, light-headedness, numbness and headaches         Physical Exam  Neck:        Comments: C/S ROM Pnful and limited in [place]//S ROM pnful and limited in [address]          SOFT TISSUE ASSESSMENT: Hypertonicity and tenderness palpated B C5-T6 erector spinae, R upper traps, L lev scap, R SCM, R rhomboid; Gait: wnl; JOINT RECTRICTIONS: C5-T6  ORTHO: Marii unremarkable for centralization/peripheralization; max foraminal comp elicits local np on L; shoulder depression elicits stiffness in R upper trap; brachial plexus tension test neg B; cervical distraction elicits relieves CC    Return in about 1 week (around 4/27/2023) for Next scheduled follow up

## 2023-04-20 NOTE — LETTER
April 25, 2023     BETO Garcia  [address]  [address]  [address] [place] [address]    Patient: Rikki Fong   YOB: 1980   Date of Visit: 4/20/2023       Dear Dr Vijay Milan: Thank you for referring Rikki Fong to me for evaluation  Below are my notes for this consultation  If you have questions, please do not hesitate to call me  I look forward to following your patient along with you  Sincerely,        Stefani Abdul DC        CC: No Recipients  Stefani Abdul DC  4/24/2023 12:53 PM  Signed  Diagnoses and all orders for this visit:    Segmental dysfunction of thoracic region    Muscle spasm    Segmental dysfunction of cervical region    No red flag, radiculopathy or neurologic deficit appreciated clinically  Pt's symptoms and exam findings consistent with mechanical neck/ubp secondary to repetitive st/sp injury, exacerbated by postural/ergonomic stressors  Pt responded well to stretches and manual mobilization of the affected spinal and myofascial tissues with increased ROM; trial of conservative tx recommended consisting of stretching, ther-ex, graded mobilization/manipulation of the affected spinal/myofascial tissues, postural/ergonomic education and take home stretches/exercises  If symptoms fail to improve with short trial of conservative care, appropriate imaging and referral will be coordinated  Spent greater than 30 min c pt discussing hx, pe, ddx, tx options and reviewing notes/imaging    TREATMENT:  Fear avoidance behavior discussion, encouraged and reassured pt that natural course of condition is to improve over time with adherence to tx plan and home care strategies  Home care recommendations: avoid bed rest, walk (but avoid trails and uneven surfaces), gradual return to activity to tolerance (avoid anything that peripheralizes symptoms), ice 20 min on/off, watch for ice burn, call if symptoms peripheralize, worsen, or neurologic deficit progresses   Ther-ex: IASTM - discussed post procedure soreness and/or ecchymosis for up to 36 hrs, applied to affected mm hypertonicities; wall yoni, axial retraction, upper trap stretch, lev scap stretch, SCM stretch, lat rows with t-band, lat pull down with t-band, abdominal bracing  Thoracic mobilization/manipulation: prone P-A mob, supine A-P manip; cervical mobilization/manipulation: diversified supine graded mobilization, cervical traction, prone C/T jct HVLA    Pt presents for eval and tx for R sided back and chest pain onset Jan 2023  Pt reports awaking with chest pain that has centralized into back over time with use of nsaids and massage gun to shoulder and back region  Pt reports he awakes with symptoms every am but they ease after a couple hrs  Pt reports curling up in sleep  Pt went to ED concerned for heart issue intially; has seen PCP and  for this, who referred here  Pt was referred to G as well but exam was wnl  Pt is active and lifts weights regularly  Back Pain  This is a new problem  The current episode started more than 1 month ago  The problem occurs daily  The problem has been gradually improving since onset  The pain is present in the thoracic spine  The quality of the pain is described as aching and cramping  Pain scale: 0-2/10  Exacerbated by: shoulder presses, sleeping; palliative includes nsaids and massage  Pertinent negatives include no chest pain, dysuria, fever, headaches, numbness or weakness  Christie Osei is a 38 y o  male   Chief Complaint   Patient presents with   • Neck - Pain, New Patient Visit     Past Medical History:   Diagnosis Date   • Hypogonadism in male         The following portions of the patient's history were reviewed and updated as appropriate: allergies, past family history, past medical history, past social history, past surgical history, and problem list     Review of Systems   Constitutional: Negative for fever and unexpected weight change     HENT: Negative for tinnitus and trouble swallowing  Cardiovascular: Negative for chest pain  Gastrointestinal: Negative for nausea  Genitourinary: Negative for dysuria  Musculoskeletal: Positive for back pain  Neurological: Negative for dizziness, syncope, speech difficulty, weakness, light-headedness, numbness and headaches  Physical Exam  Neck:        Comments: C/S ROM Pnful and limited in [place]//S ROM pnful and limited in [address]          SOFT TISSUE ASSESSMENT: Hypertonicity and tenderness palpated B C5-T6 erector spinae, R upper traps, L lev scap, R SCM, R rhomboid; Gait: wnl; JOINT RECTRICTIONS: C5-T6  ORTHO: Marii unremarkable for centralization/peripheralization; max foraminal comp elicits local np on L; shoulder depression elicits stiffness in R upper trap; brachial plexus tension test neg B; cervical distraction elicits relieves CC    Return in about 1 week (around 4/27/2023) for Next scheduled follow up

## 2023-05-01 DIAGNOSIS — E29.1 TESTICULAR HYPOGONADISM: ICD-10-CM

## 2023-05-02 RX ORDER — TESTOSTERONE 16.2 MG/G
40.5 GEL TRANSDERMAL EVERY MORNING
Qty: 75 G | Refills: 0 | Status: SHIPPED | OUTPATIENT
Start: 2023-05-02

## 2023-05-02 NOTE — TELEPHONE ENCOUNTER
Requested medication(s) are due for refill today: Yes  Patient has already received a courtesy refill: No  Other reason request has been forwarded to provider:   Off-Protocol Failed 05/01/2023 05:20 PM   Protocol Details  Medication not assigned to a protocol, review manually      Valid encounter within last 12 months

## 2023-05-09 ENCOUNTER — TELEPHONE (OUTPATIENT)
Dept: ENDOCRINOLOGY | Facility: CLINIC | Age: 43
End: 2023-05-09

## 2023-05-09 NOTE — TELEPHONE ENCOUNTER
Received phone message from [place] from the patient's insurance [facility]) advising his prescription for testosterone (Androgel) 1 62% gel pump requires prior authorization  PA submitted through Cover My Meds       NOBLE IBARRA Key: [id number]  Need help? Call us at [phone]  Status   Sent to [facility]  Drug    Testosterone 1 62% gel   Form    The [facility] Drug Authorization Form

## 2023-05-10 NOTE — TELEPHONE ENCOUNTER
JOCELYN IBARRA Parry: [id number]  Need help? Call us at [phone]  Outcome   Approvedtoday  Effective from 05/10/2023 through 05/10/2024  Drug    Testosterone 1 62% gel   Form    [employer] Electronic Prescription Drug Authorization Form     Patient made aware through [employer] that it was approved and his pharmacy should be able to fill it for him at this time

## 2023-05-11 ENCOUNTER — PROCEDURE VISIT (OUTPATIENT)
Age: 43
End: 2023-05-11

## 2023-05-11 VITALS
BODY MASS INDEX: 29.7 KG/M2 | HEIGHT: 68 IN | DIASTOLIC BLOOD PRESSURE: 60 MMHG | SYSTOLIC BLOOD PRESSURE: 108 MMHG | OXYGEN SATURATION: 98 % | WEIGHT: 196 LBS | HEART RATE: 46 BPM

## 2023-05-11 DIAGNOSIS — M62.838 MUSCLE SPASM: ICD-10-CM

## 2023-05-11 DIAGNOSIS — M99.01 SEGMENTAL DYSFUNCTION OF CERVICAL REGION: ICD-10-CM

## 2023-05-11 DIAGNOSIS — M99.02 SEGMENTAL DYSFUNCTION OF THORACIC REGION: Primary | ICD-10-CM

## 2023-05-12 NOTE — PROGRESS NOTES
Diagnoses and all orders for this visit:    Segmental dysfunction of thoracic region    Muscle spasm    Segmental dysfunction of cervical region    Pt much improved; suffered mild flare-up but responded well to tx with reduced pain and increased ROM    TREATMENT:  Ther-ex: IASTM - discussed post procedure soreness and/or ecchymosis for up to 36 hrs, applied to affected mm hypertonicities; wall yoni, axial retraction, upper trap stretch, lev scap stretch, SCM stretch, lat rows with t-band, lat pull down with t-band, abdominal bracing  Thoracic mobilization/manipulation: prone P-A mob, supine A-P manip; cervical mobilization/manipulation: diversified supine graded mobilization, cervical traction, prone C/T jct HVLA    Pt presents for tx for neck/ upper back and chest pain onset Jan 2023  Pt exercises regularly but does not stretch or do much self care  5/12: Pt reports chest pain resolved but suffered mild flare-up of neck/ubp with overhead lifting    Back Pain  This is a new problem  The current episode started more than 1 month ago  The problem occurs daily  The problem has been gradually improving since onset  The pain is present in the thoracic spine  The quality of the pain is described as aching and cramping  Pain scale: 0-2/10  Exacerbated by: shoulder presses, sleeping; palliative includes nsaids and massage  Theotis Milder is a 38 y o  male   Chief Complaint   Patient presents with   • Spine - Follow-up     Past Medical History:   Diagnosis Date   • Hypogonadism in male         The following portions of the patient's history were reviewed and updated as appropriate: allergies, past family history, past medical history, past social history, past surgical history, and problem list     Review of Systems   Musculoskeletal: Positive for back pain         Physical Exam  Neck:        Comments: C/S ROM Pnful and limited in [place]//S ROM pnful and limited in [address]          SOFT TISSUE ASSESSMENT: Hypertonicity and tenderness palpated B C5-T6 erector spinae, R upper traps, L lev scap, R SCM, R rhomboid; Gait: wnl; JOINT RECTRICTIONS: C5-T6      Return if symptoms worsen or fail to improve

## 2023-06-05 ENCOUNTER — OFFICE VISIT (OUTPATIENT)
Dept: ENDOCRINOLOGY | Facility: CLINIC | Age: 43
End: 2023-06-05
Payer: COMMERCIAL

## 2023-06-05 VITALS
DIASTOLIC BLOOD PRESSURE: 78 MMHG | HEART RATE: 49 BPM | HEIGHT: 68 IN | SYSTOLIC BLOOD PRESSURE: 120 MMHG | BODY MASS INDEX: 30.01 KG/M2 | WEIGHT: 198 LBS

## 2023-06-05 DIAGNOSIS — E29.1 TESTICULAR HYPOGONADISM: ICD-10-CM

## 2023-06-05 PROCEDURE — 99214 OFFICE O/P EST MOD 30 MIN: CPT

## 2023-06-05 RX ORDER — TESTOSTERONE 16.2 MG/G
40.5 GEL TRANSDERMAL EVERY MORNING
Qty: 75 G | Refills: 0 | Status: SHIPPED | OUTPATIENT
Start: 2023-06-05

## 2023-06-05 NOTE — ASSESSMENT & PLAN NOTE
Patient has lab work which has been ordered but not done yet  We will review once results are available  Continue with current regimen for now

## 2023-06-05 NOTE — PROGRESS NOTES
Established Patient Progress Note    CC: Follow-up for hypogonadism    Impression & Plan:    Problem List Items Addressed This Visit        Endocrine    Testicular hypogonadism     Patient has lab work which has been ordered but not done yet  We will review once results are available  Continue with current regimen for now  Relevant Medications    testosterone (ANDROGEL) 1 62 % TD gel pump       No orders of the defined types were placed in this encounter  History of Present Illness:   Gabriel Nash is a 38 y o  male with a history of hypogonadism currently on testosterone therapy  He is on AndroGel 40 5 mg daily  He has no complaints today  He denies trouble urinating, difficulty sleeping or daytime fatigue  Patient Active Problem List   Diagnosis   • Abnormal results of liver function studies   • Adult stuttering   • Testicular hypogonadism   • Vitamin D deficiency   • BMI 25 0-25 9,adult   • Mixed hyperlipidemia   • Psychophysiological insomnia   • Asymptomatic reticular veins 1 mm to 3 mm in diameter   • Asymptomatic spider veins of both lower extremities   • JASMINE (generalized anxiety disorder)      Past Medical History:   Diagnosis Date   • Hypogonadism in male       Past Surgical History:   Procedure Laterality Date   • TOOTH EXTRACTION     • WISDOM TOOTH EXTRACTION        Family History   Problem Relation Age of Onset   • Multiple sclerosis Mother      Social History     Tobacco Use   • Smoking status: Never   • Smokeless tobacco: Never   Substance Use Topics   • Alcohol use:  Yes     Alcohol/week: 2 0 standard drinks of alcohol     Types: 2 Cans of beer per week     Comment: social     No Known Allergies      Current Outpatient Medications:   •  Cholecalciferol (Vitamin D3) 125 MCG (5000 UT) CAPS, Take 5,000 Units by mouth daily, Disp: , Rfl:   •  meloxicam (Mobic) 15 mg tablet, Take 1 tablet (15 mg total) by mouth daily, Disp: 30 tablet, Rfl: 0  •  propranolol (INDERAL) 10 mg tablet, Take 1 "tablet (10 mg total) by mouth daily as needed (anxiety/public speaking), Disp: 10 tablet, Rfl: 0  •  testosterone (ANDROGEL) 1 62 % TD gel pump, Apply 2 actuation (40 5 mg total) topically every morning, Disp: 75 g, Rfl: 0  •  traZODone (DESYREL) 100 mg tablet, Take 1 5 tablets (150 mg total) by mouth daily at bedtime take 1 tablet by mouth at bedtime, Disp: 135 tablet, Rfl: 1  •  sertraline (Zoloft) 100 mg tablet, Take 1 tablet (100 mg total) by mouth daily (Patient not taking: Reported on 6/5/2023), Disp: 90 tablet, Rfl: 1    Review of Systems   Constitutional: Negative for chills and fever  HENT: Negative for ear pain and sore throat  Eyes: Negative for pain and visual disturbance  Respiratory: Negative for cough and shortness of breath  Cardiovascular: Negative for chest pain and palpitations  Gastrointestinal: Negative for abdominal pain and vomiting  Genitourinary: Negative for dysuria and hematuria  Musculoskeletal: Negative for arthralgias and back pain  Skin: Negative for color change and rash  Neurological: Negative for seizures and syncope  All other systems reviewed and are negative  Physical Exam:  Body mass index is 30 11 kg/m²  /78 (BP Location: Left arm, Patient Position: Sitting, Cuff Size: Standard)   Pulse (!) 49   Ht 5' 8\" (1 727 m)   Wt 89 8 kg (198 lb)   BMI 30 11 kg/m²    Wt Readings from Last 3 Encounters:   06/05/23 89 8 kg (198 lb)   05/11/23 88 9 kg (196 lb)   04/20/23 89 2 kg (196 lb 9 6 oz)       Physical Exam  Vitals reviewed  Constitutional:       Appearance: Normal appearance  HENT:      Head: Normocephalic and atraumatic  Cardiovascular:      Rate and Rhythm: Bradycardia present  Heart sounds: Normal heart sounds  Pulmonary:      Effort: Pulmonary effort is normal       Breath sounds: Normal breath sounds  Neurological:      Mental Status: He is alert and oriented to person, place, and time     Psychiatric:         Mood and Affect: " "Mood normal          Behavior: Behavior normal          Labs:   Lab Results   Component Value Date    HGBA1C 5 4 08/20/2021    HGBA1C 5 4 11/30/2020    HGBA1C 5 4 08/05/2019     Lab Results   Component Value Date    BUN 21 11/30/2022     11/30/2022    CO2 25 11/30/2022    CREATININE 1 09 11/30/2022    CREATININE 1 00 08/20/2021    CREATININE 1 03 11/30/2020    K 4 4 11/30/2022     eGFR   Date Value Ref Range Status   11/30/2022 82 ml/min/1 73sq m Final     Lab Results   Component Value Date    HDL 53 08/20/2021    TRIG 203 (H) 08/20/2021     Lab Results   Component Value Date    ALKPHOS 61 11/30/2022    ALT 31 11/30/2022    AST 40 11/30/2022     Lab Results   Component Value Date    CIN8GWLHQKNA 2 730 08/20/2021    EPG4TLJEQSGD 1 890 11/30/2020    CDK2RFTKPAUS 2 284 08/15/2018     No results found for: \"FREET4\", \"TSI\"      There are no Patient Instructions on file for this visit  Discussed with the patient and all questioned fully answered  He will call me if any problems arise      "

## 2023-06-07 DIAGNOSIS — F98.5 ADULT STUTTERING: Primary | ICD-10-CM

## 2023-06-27 ENCOUNTER — EVALUATION (OUTPATIENT)
Dept: SPEECH THERAPY | Facility: CLINIC | Age: 43
End: 2023-06-27
Payer: COMMERCIAL

## 2023-06-27 DIAGNOSIS — R48.8 OTHER SYMBOLIC DYSFUNCTIONS: ICD-10-CM

## 2023-06-27 DIAGNOSIS — F98.5 ADULT STUTTERING: Primary | ICD-10-CM

## 2023-06-27 PROCEDURE — 92521 EVALUATION OF SPEECH FLUENCY: CPT

## 2023-06-27 PROCEDURE — 92507 TX SP LANG VOICE COMM INDIV: CPT

## 2023-06-27 NOTE — PROGRESS NOTES
Speech-Language Pathology Initial Evaluation    Today's date: 2023    Patient’s name: Beau Pat  : 1980  MRN: [id number]  Safety measures: none  Referring provider: CARLOS MANUEL Stevenson    Encounter Diagnosis     ICD-10-CM    1  Adult stuttering  F98 5 Ambulatory Referral to Speech Therapy      2  Other symbolic dysfunctions  B52 6         Visit tracking:  -Referring provider: Epic  -Billing guidelines: AMA  -Visit #  -Insurance: [employer]  -RE due 23    Subjective comments: Patient arrived alone for evaluation and was pleasant throughout the session  How did the patient hear about us? Physician    Patient's goal(s): looking for techniques to do something different because what he's doing now is not working  Reason for referral: Difficulty producing fluent speech  Prior functional status: Communication appropriate and efficient in most situations  Minimal difficulty with self-monitoring, self-correction needed  Clinically complex situations: Previous therapy to address similar deficits    History: Patient is a 38 y o  male who was referred to outpatient skilled Speech Therapy services for a fluency evaluation  Hx of childhood stuttering that has continued into adulthood  Hx of speech impairments for grandfather and mother  Patient is concerned stuttering is affecting his performance at work  Patient reports his stuttering is characterized by blocking up to 10 seconds in length, fist clenching, hand movements, and lack of eye contact  Triggers for his stuttering include lack of sleep in duration and/or quality, distracting/noisy environments, stress/anxiety, /s/ words, and exercising or weight lifting in the mornings  Patient reports that stuttering is less severe at home than work  He has had speech therapy most recently in 2017, which he felt was unhelpful  Patient reports when he feels his heart racing, he takes a deep breath and that usually helps his stuttering   He reports he listens to stuttering-related podcasts and would like to seek out group therapy  Hearing: WFL  Vision: WFL with contacts    Home environment/lifestyle: lives with wife - works full time  Highest level of education: Some college  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    Mental status: Alert  Behavior status: Cooperative  Patient reported symptoms of: anxiety  Communication modalities: Verbal  Rehabilitation prognosis: Excellent rehab potential to reach and maintain prior level of function    Assessments      The Stuttering Severity Instrument 4th edition (SSI-4) is a standardized assessment that can be used for  children, school-aged children, and adults to assess stuttering severity across 4 different areas; (1) frequency of stutter, (2) duration of stutter, (3) physical concomitant behaviors, and (4) naturalness of speech  The following results were gathered during today’s evaluation:    Section: Findings/Observations: Score:   *FREQUENCY:  4   -Reading Task (156 syllables) 0 64% syllables stuttered    -Speaking Task (220 syllables) 0 91% syllables stuttered         *DURATION: (avg 3 longest moments of stuttering events) <0 5 seconds 2        *PHYSICAL CONCOMITANTS: Poor eye contact and Arm & hand movement 3       TOTAL OVERALL SCORE: 9   Percentile: <1 0%ile   Severity Rating: Very Mild     Pertinent family history: grandfather stutters and mother has articulation impairment     Patient's reactions to his stuttering: patient experiences anxiety surrounding his stuttering     Communication partners' reactions to his stuttering: unknown although it happens mostly at work  Physical tension/secondary behaviors: fist clenching, hand movement, and poor eye contact       Patterns with moments of stuttering: blocking    Other speech-language comments: Triggers for his stuttering include lack of sleep in duration and/or quality, distracting/noisy environments, stress/anxiety, /s/ words, and exercising or weight lifting in the mornings  Goals    Short-term goals:     Patient will record speech in the work setting and return recording to session for analysis  (to be completed in 2-3 weeks)    To increase overall fluency, patient will be educated on stuttering modification and fluency shaping strategies and demonstrate understanding in 100% of opportunities  To be completed in 3-4 weeks  To increase effectiveness of communication, patient will demonstrate appropriate skills for communication effectiveness in conversation (e g eye contact, appropriate rate of speech, breath support) given a distracting environment in 80% of opportunities across 4 weeks  To improve awareness, patient will identify the location of physical tension during stuttering moments in 80% of opportunities across two sessions  Long-term goals:    Patient will improve communication effectiveness in conversation through use of direct and indirect stuttering strategies in 90% of opportunities in 4-6 weeks  Impressions/Recommendations    Impressions:   -Patient presents with mild fluency disorder characterized by blocking, fist clenching, poor eye contact, hand movement, and anxiety surrounding stuttering  Patient reports stuttering is also affecting performance at work  Patient has family hx of stuttering  Patient was instructed to record himself at work and return recording for further analysis of stuttering pattern  Skilled speech therapy services are recommended 1x/week to improve communication effectiveness, strategy use, and increase participation in all communication settings       Recommendations:  -Patient would benefit from outpatient skilled Speech Therapy services: Speech-language therapy    -Frequency: 1x weekly  -Duration: 6-8 weeks    -Intervention certification from: 5/59/5784  -Intervention certification to: 7/23/92    -Intervention comments:   45 min testing, 15 min patient education, 45 min testing analysis and care plan creation

## 2023-07-11 ENCOUNTER — OFFICE VISIT (OUTPATIENT)
Dept: SPEECH THERAPY | Facility: CLINIC | Age: 43
End: 2023-07-11
Payer: COMMERCIAL

## 2023-07-11 DIAGNOSIS — F98.5 ADULT STUTTERING: ICD-10-CM

## 2023-07-11 DIAGNOSIS — R48.8 OTHER SYMBOLIC DYSFUNCTIONS: Primary | ICD-10-CM

## 2023-07-11 PROCEDURE — 92507 TX SP LANG VOICE COMM INDIV: CPT

## 2023-07-11 NOTE — PROGRESS NOTES
Daily Speech Treatment Note    Today's date: 2023  Patient’s name: Sudhir Briceno  : 1980  MRN: [id number]  Safety measures: none  Referring provider: CARLOS MANUEL Thomson    Encounter Diagnosis     ICD-10-CM    1. Other symbolic dysfunctions  Q86.0       2. Adult stuttering  F98.5         Visit tracking:  -Referring provider: Epic  -Billing guidelines: AMA  -Visit #  -Insurance: [employer]  -RE due 23    Subjective/Behavioral:  -Patient was in good spirits and surprised to see coverage therapist again. Objective/Assessment:  -Reviewed testing results and goals in plan care with patient. Patient is in agreement at this time. -Reviewed patient's home exercises/activities completed since last appointment. Patient brought speech recording from work this session. Short-term goals:  Patient will record speech in the work setting and return recording to session for analysis. (to be completed in 2-3 weeks). Patient brought audio recording of speech at work for analysis. Patient recording contained 25 stuttering moments in 158 syllables = 15.8% stuttering.     To increase overall fluency, patient will be educated on stuttering modification and fluency shaping strategies and demonstrate understanding in 100% of opportunities. To be completed in 3-4 weeks. Patient was educated on sliding/pull-out strategy, laryngeal massage, relaxation techniques, diaphragmatic breathing, and fluency shaping strategies. He demonstrated understanding with techniques.     To increase effectiveness of communication, patient will demonstrate appropriate skills for communication effectiveness in conversation (e.g eye contact, appropriate rate of speech, breath support) given a distracting environment in 80% of opportunities across 4 weeks.     To improve awareness, patient will identify the location of physical tension during stuttering moments in 80% of opportunities across two sessions.     Patient identified tension in neck and chest during blocking occurrences. Plan:  -Patient was provided with home exercises/activities to target goals in plan of care at the end of today's session.  -Continue with current plan of care.

## 2023-07-18 ENCOUNTER — APPOINTMENT (OUTPATIENT)
Dept: SPEECH THERAPY | Facility: CLINIC | Age: 43
End: 2023-07-18
Payer: COMMERCIAL

## 2023-07-24 DIAGNOSIS — E29.1 TESTICULAR HYPOGONADISM: ICD-10-CM

## 2023-07-25 ENCOUNTER — OFFICE VISIT (OUTPATIENT)
Dept: SPEECH THERAPY | Facility: CLINIC | Age: 43
End: 2023-07-25
Payer: COMMERCIAL

## 2023-07-25 DIAGNOSIS — R48.8 OTHER SYMBOLIC DYSFUNCTIONS: Primary | ICD-10-CM

## 2023-07-25 DIAGNOSIS — F98.5 ADULT STUTTERING: ICD-10-CM

## 2023-07-25 PROCEDURE — 92507 TX SP LANG VOICE COMM INDIV: CPT

## 2023-07-25 NOTE — PROGRESS NOTES
Daily Speech Treatment Note    Today's date: 2023  Patient’s name: Jose R Serna  : 1980  MRN: [id number]  Safety measures: none  Referring provider: CARLOS MANUEL Decker    Encounter Diagnosis     ICD-10-CM    1. Other symbolic dysfunctions  I08.7       2. Adult stuttering  F98.5         Visit tracking:  -Referring provider: Epic  -Billing guidelines: AMA  -Visit #3/60  -Insurance: [employer]  -RE due 23    Subjective/Behavioral:  Patient reports his speech has been much better the last few weeks. He is feeling more confident and feels that being in therapy is helping him have that confidence     Objective/Assessment:  -Reviewed testing results and goals in plan care with patient. Patient is in agreement at this time. -Reviewed patient's home exercises/activities completed since last appointment. Patient brought speech recording from work this session. Short-term goals:  Patient will record speech in the work setting and return recording to session for analysis. (to be completed in 2-3 weeks).     Reviewed his recordings today on his cell phone    To increase overall fluency, patient will be educated on stuttering modification and fluency shaping strategies and demonstrate understanding in 100% of opportunities. To be completed in 3-4 weeks. Patient was educated on strategies (handout provided)    Reviewed diaphragmatic breathing - patient with adequate movement.  Patient asking more about reasoning behind breathing (as it is not his favorite exercise)     To increase effectiveness of communication, patient will demonstrate appropriate skills for communication effectiveness in conversation (e.g eye contact, appropriate rate of speech, breath support) given a distracting environment in 80% of opportunities across 4 weeks.     To improve awareness, patient will identify the location of physical tension during stuttering moments in 80% of opportunities across two sessions. Following patient's verbal consent to treat, manual therapy technique through myofascial release was applied to patient's chest/neck. Region(s) 07/25         Hyoid /Cervical   L>R tension         Chest    x5         Palpatory examination revealed mild mechanosensitivity (i.e., sensitivity to mechanical pressure/stretch) through the chest/sternum region. A mild palpatory pressure was suspected to show a mild area of apparent thickening that, with stimulation/pressure/stretch, reproduced patient's complaints/correlated to his primary issue of tension. Patient verbalized that technique/pressure, through triggering his symptoms, was helpful. 3/10    Plan:  -Patient was provided with home exercises/activities to target goals in plan of care at the end of today's session.  -Continue with current plan of care.

## 2023-07-26 RX ORDER — TESTOSTERONE 16.2 MG/G
40.5 GEL TRANSDERMAL EVERY MORNING
Qty: 75 G | Refills: 0 | Status: SHIPPED | OUTPATIENT
Start: 2023-07-26

## 2023-07-26 NOTE — TELEPHONE ENCOUNTER
Requested medication(s) are due for refill today: Yes  Patient has already received a courtesy refill: Yes  Other reason request has been forwarded to provider:   Off-Protocol Failed 07/24/2023 11:02 AM   Protocol Details  Medication not assigned to a protocol, review manually.

## 2023-08-21 DIAGNOSIS — E29.1 TESTICULAR HYPOGONADISM: ICD-10-CM

## 2023-08-22 RX ORDER — TESTOSTERONE 20.25 MG/1.25G
GEL TOPICAL
Qty: 2 G | Refills: 0 | Status: SHIPPED | OUTPATIENT
Start: 2023-08-22

## 2023-08-24 RX ORDER — TESTOSTERONE 16.2 MG/G
40.5 GEL TRANSDERMAL EVERY MORNING
Qty: 1 G | Refills: 0 | OUTPATIENT
Start: 2023-08-24

## 2023-09-21 DIAGNOSIS — F41.1 GAD (GENERALIZED ANXIETY DISORDER): ICD-10-CM

## 2023-09-21 RX ORDER — SERTRALINE HYDROCHLORIDE 100 MG/1
100 TABLET, FILM COATED ORAL DAILY
Qty: 90 TABLET | Refills: 1 | Status: SHIPPED | OUTPATIENT
Start: 2023-09-21

## 2023-09-22 ENCOUNTER — APPOINTMENT (OUTPATIENT)
Dept: LAB | Facility: CLINIC | Age: 43
End: 2023-09-22
Payer: COMMERCIAL

## 2023-09-22 DIAGNOSIS — E29.1 3-OXO-5 ALPHA-STEROID DELTA 4-DEHYDROGENASE DEFICIENCY: ICD-10-CM

## 2023-09-22 DIAGNOSIS — E78.2 MIXED HYPERLIPIDEMIA: ICD-10-CM

## 2023-09-22 LAB
BASOPHILS # BLD AUTO: 0.03 THOUSANDS/ÂΜL (ref 0–0.1)
BASOPHILS NFR BLD AUTO: 1 % (ref 0–1)
CHOLEST SERPL-MCNC: 236 MG/DL
EOSINOPHIL # BLD AUTO: 0.08 THOUSAND/ÂΜL (ref 0–0.61)
EOSINOPHIL NFR BLD AUTO: 1 % (ref 0–6)
ERYTHROCYTE [DISTWIDTH] IN BLOOD BY AUTOMATED COUNT: 13 % (ref 11.6–15.1)
HCT VFR BLD AUTO: 48.6 % (ref 36.5–49.3)
HDLC SERPL-MCNC: 62 MG/DL
HGB BLD-MCNC: 15.7 G/DL (ref 12–17)
IMM GRANULOCYTES # BLD AUTO: 0.04 THOUSAND/UL (ref 0–0.2)
IMM GRANULOCYTES NFR BLD AUTO: 1 % (ref 0–2)
LDLC SERPL CALC-MCNC: 152 MG/DL (ref 0–100)
LYMPHOCYTES # BLD AUTO: 1.96 THOUSANDS/ÂΜL (ref 0.6–4.47)
LYMPHOCYTES NFR BLD AUTO: 31 % (ref 14–44)
MCH RBC QN AUTO: 30.3 PG (ref 26.8–34.3)
MCHC RBC AUTO-ENTMCNC: 32.3 G/DL (ref 31.4–37.4)
MCV RBC AUTO: 94 FL (ref 82–98)
MONOCYTES # BLD AUTO: 0.45 THOUSAND/ÂΜL (ref 0.17–1.22)
MONOCYTES NFR BLD AUTO: 7 % (ref 4–12)
NEUTROPHILS # BLD AUTO: 3.78 THOUSANDS/ÂΜL (ref 1.85–7.62)
NEUTS SEG NFR BLD AUTO: 59 % (ref 43–75)
NONHDLC SERPL-MCNC: 174 MG/DL
NRBC BLD AUTO-RTO: 0 /100 WBCS
PLATELET # BLD AUTO: 226 THOUSANDS/UL (ref 149–390)
PMV BLD AUTO: 11.1 FL (ref 8.9–12.7)
RBC # BLD AUTO: 5.18 MILLION/UL (ref 3.88–5.62)
TRIGL SERPL-MCNC: 112 MG/DL
WBC # BLD AUTO: 6.34 THOUSAND/UL (ref 4.31–10.16)

## 2023-09-22 PROCEDURE — 80061 LIPID PANEL: CPT

## 2023-09-22 PROCEDURE — 85025 COMPLETE CBC W/AUTO DIFF WBC: CPT

## 2023-09-22 PROCEDURE — 84402 ASSAY OF FREE TESTOSTERONE: CPT

## 2023-09-22 PROCEDURE — 84403 ASSAY OF TOTAL TESTOSTERONE: CPT

## 2023-09-24 LAB
TESTOST FREE SERPL-MCNC: 4.5 PG/ML (ref 6.8–21.5)
TESTOST SERPL-MCNC: 438 NG/DL (ref 264–916)

## 2023-09-26 DIAGNOSIS — E29.1 TESTICULAR HYPOGONADISM: ICD-10-CM

## 2023-09-26 RX ORDER — TESTOSTERONE 20.25 MG/1.25G
GEL TOPICAL
Qty: 2 G | Refills: 1 | Status: SHIPPED | OUTPATIENT
Start: 2023-09-26 | End: 2023-11-08 | Stop reason: SDUPTHER

## 2023-10-12 ENCOUNTER — OFFICE VISIT (OUTPATIENT)
Dept: FAMILY MEDICINE CLINIC | Facility: CLINIC | Age: 43
End: 2023-10-12
Payer: COMMERCIAL

## 2023-10-12 VITALS
BODY MASS INDEX: 28.79 KG/M2 | DIASTOLIC BLOOD PRESSURE: 72 MMHG | SYSTOLIC BLOOD PRESSURE: 120 MMHG | OXYGEN SATURATION: 99 % | WEIGHT: 190 LBS | HEART RATE: 49 BPM | HEIGHT: 68 IN | TEMPERATURE: 97.4 F

## 2023-10-12 DIAGNOSIS — M79.641 BILATERAL HAND PAIN: Primary | ICD-10-CM

## 2023-10-12 DIAGNOSIS — M25.642 HAND JOINT STIFF, LEFT: ICD-10-CM

## 2023-10-12 DIAGNOSIS — M79.642 BILATERAL HAND PAIN: Primary | ICD-10-CM

## 2023-10-12 DIAGNOSIS — E78.2 MIXED HYPERLIPIDEMIA: ICD-10-CM

## 2023-10-12 DIAGNOSIS — M25.50 POLYARTHRALGIA: ICD-10-CM

## 2023-10-12 DIAGNOSIS — E55.9 VITAMIN D DEFICIENCY: ICD-10-CM

## 2023-10-12 DIAGNOSIS — M25.521 RIGHT ELBOW PAIN: ICD-10-CM

## 2023-10-12 DIAGNOSIS — M25.522 LEFT ELBOW PAIN: ICD-10-CM

## 2023-10-12 DIAGNOSIS — M25.522 BILATERAL ELBOW JOINT PAIN: ICD-10-CM

## 2023-10-12 DIAGNOSIS — M25.641 HAND JOINT STIFF, RIGHT: ICD-10-CM

## 2023-10-12 DIAGNOSIS — M25.521 BILATERAL ELBOW JOINT PAIN: ICD-10-CM

## 2023-10-12 PROCEDURE — 99214 OFFICE O/P EST MOD 30 MIN: CPT | Performed by: NURSE PRACTITIONER

## 2023-10-12 NOTE — PATIENT INSTRUCTIONS
Complete x rays- these are just walk in. Complete blood work- this does not need to be fasting. PSA (prostate surface antigen) due at the end of November. Please call the office if you are experiencing any worsening of symptoms or no symptom improvement.

## 2023-10-12 NOTE — PROGRESS NOTES
Name: Rebeca Habermann      : 1980      MRN: [id number]  Encounter Provider: RL Cardoza  Encounter Date: 10/12/2023   Encounter department: [address] [place] [address]     1. Bilateral hand pain  -     Lyme Disease Serology w/Reflex; Future  -     VIDYA Screen w/ Reflex to Titer/Pattern; Future  -     RF Screen w/ Reflex to Titer; Future  -     VIDYA Screen w/ Reflex to Titer/Pattern    2. Hand joint stiff, right  -     XR hand 3+ vw right; Future; Expected date: 10/12/2023    3. Hand joint stiff, left  -     XR hand 3+ vw left; Future; Expected date: 10/12/2023    4. Bilateral elbow joint pain  -     Lyme Disease Serology w/Reflex; Future  -     VIDYA Screen w/ Reflex to Titer/Pattern; Future  -     RF Screen w/ Reflex to Titer; Future  -     TSH, 3rd generation; Future  -     VIDYA Screen w/ Reflex to Titer/Pattern  -     TSH, 3rd generation    5. Polyarthralgia  -     Lyme Disease Serology w/Reflex; Future  -     VIDYA Screen w/ Reflex to Titer/Pattern; Future  -     RF Screen w/ Reflex to Titer; Future  -     TSH, 3rd generation; Future  -     VIDYA Screen w/ Reflex to Titer/Pattern  -     TSH, 3rd generation    6. Vitamin D deficiency  -     Vitamin D 25 hydroxy; Future  -     Vitamin D 25 hydroxy    7. Mixed hyperlipidemia  -     TSH, 3rd generation; Future  -     TSH, 3rd generation    8. Right elbow pain  -     XR elbow 2 vw right; Future; Expected date: 10/12/2023    9. Left elbow pain  -     XR elbow 2 vw left; Future; Expected date: 10/12/2023      Discussed differentials with patient. Complete x rays- these are just walk in. Complete blood work- this does not need to be fasting. PSA (prostate surface antigen) due at the end of November. Recheck 6 months for follow up and well visit. Please call the office if you are experiencing any worsening of symptoms or no symptom improvement. BMI Counseling: Body mass index is 28.89 kg/m².  The BMI is above normal. Nutrition recommendations include reducing intake of cholesterol. Exercise recommendations include exercising 3-5 times per week. Rationale for BMI follow-up plan is due to patient being overweight or obese. Subjective      Here to discuss joint pain. Hasn't been able to work out since middle of August. Having pain in elbows and hands. His hands were really sore and didn't have full range of motion in hands/finger. He was talking to his wife about it and wanted to talk about treatment as he wants to keep working out and doesn't want this to interfere or getting worse. Pain not anywhere else. His hands/fingers were swollen. His elbows got red and swollen too. They don't hurt right now. This was the first time he had a flare like this. He has been putting off working out to prevent this from worsening. No systemic symptoms with the flare that he remembers. He has a family history of arthritis but unsure if it's osteo or rheumatoid. He was doing triceps press downs every day and then he started with elbow pain. He stopped doing that but then pain just got worse. He did take NSAID PRN, not multiple doses a day. Overall pains has improved/better. Review of Systems   Constitutional:  Negative for chills and fever. Eyes:  Negative for discharge. Respiratory:  Negative for shortness of breath. Cardiovascular:  Negative for chest pain. Gastrointestinal:  Negative for constipation and diarrhea. Genitourinary:  Negative for difficulty urinating. Musculoskeletal:  Positive for arthralgias and joint swelling. Skin:  Negative for rash. Neurological:  Negative for headaches. Hematological:  Negative for adenopathy. Psychiatric/Behavioral:  The patient is not nervous/anxious.         Current Outpatient Medications on File Prior to Visit   Medication Sig    Cholecalciferol (Vitamin D3) 125 MCG (5000 UT) CAPS Take 5,000 Units by mouth daily    meloxicam (Mobic) 15 mg tablet Take 1 tablet (15 mg total) by mouth daily    sertraline (Zoloft) 100 mg tablet Take 1 tablet (100 mg total) by mouth daily    Testosterone 1.62 % GEL Apply 2 actuations topically every morning    traZODone (DESYREL) 100 mg tablet Take 1.5 tablets (150 mg total) by mouth daily at bedtime take 1 tablet by mouth at bedtime    propranolol (INDERAL) 10 mg tablet Take 1 tablet (10 mg total) by mouth daily as needed (anxiety/public speaking) (Patient not taking: Reported on 6/27/2023)       Objective     /72 (BP Location: Left arm, Patient Position: Sitting, Cuff Size: Large)   Pulse (!) 49   Temp (!) 97.4 °F (36.3 °C) (Temporal)   Ht 5' 8" (1.727 m)   Wt 86.2 kg (190 lb)   SpO2 99%   BMI 28.89 kg/m²     Physical Exam  Vitals and nursing note reviewed. Constitutional:       General: He is not in acute distress. Appearance: Normal appearance. He is well-developed. He is not diaphoretic. HENT:      Head: Normocephalic and atraumatic. Right Ear: External ear normal.      Left Ear: External ear normal.   Eyes:      General: Lids are normal.         Right eye: No discharge. Left eye: No discharge. Conjunctiva/sclera: Conjunctivae normal.   Cardiovascular:      Rate and Rhythm: Normal rate and regular rhythm. Heart sounds: No murmur heard. Pulmonary:      Effort: Pulmonary effort is normal. No respiratory distress. Breath sounds: Normal breath sounds. No wheezing. Musculoskeletal:         General: No swelling, tenderness or deformity. Normal range of motion. Right elbow: No effusion. Normal range of motion. No tenderness. Left elbow: No effusion. Normal range of motion. No tenderness. Right hand: Normal. No swelling or deformity. Normal range of motion. Normal strength. Normal sensation. Left hand: Normal. No swelling or deformity. Normal range of motion. Normal strength. Normal sensation.       Comments: Negative tinel  Negative phalen    Skin:     General: Skin is warm and dry. Neurological:      General: No focal deficit present. Mental Status: He is alert and oriented to person, place, and time. Psychiatric:         Speech: Speech normal.         Behavior: Behavior normal.         Thought Content:  Thought content normal.         Judgment: Judgment normal.       RL Louise

## 2023-10-21 ENCOUNTER — HOSPITAL ENCOUNTER (OUTPATIENT)
Dept: RADIOLOGY | Facility: HOSPITAL | Age: 43
Discharge: HOME | End: 2023-10-21
Payer: COMMERCIAL

## 2023-10-21 DIAGNOSIS — M25.641 HAND JOINT STIFF, RIGHT: ICD-10-CM

## 2023-10-21 DIAGNOSIS — M25.521 RIGHT ELBOW PAIN: ICD-10-CM

## 2023-10-21 DIAGNOSIS — M25.642 HAND JOINT STIFF, LEFT: ICD-10-CM

## 2023-10-21 DIAGNOSIS — M25.522 LEFT ELBOW PAIN: ICD-10-CM

## 2023-10-21 PROCEDURE — 73070 X-RAY EXAM OF ELBOW: CPT

## 2023-10-21 PROCEDURE — 73130 X-RAY EXAM OF HAND: CPT

## 2023-10-23 ENCOUNTER — TELEPHONE (OUTPATIENT)
Dept: PSYCHIATRY | Facility: CLINIC | Age: 43
End: 2023-10-23

## 2023-10-23 NOTE — TELEPHONE ENCOUNTER
Patient LVM about scheduling a new patient appointment. Writer returned call and LVM with office number for patient to return call.

## 2023-10-23 NOTE — TELEPHONE ENCOUNTER
Patient LVM about seeking an appointment. Writer called and spoke to patient. Patient stated they were looking for a provider who could assist with medical marijuana. Writer stated there aren't any providers who are able to chuyita that here. He would need to seek services for something like that elsewhere.

## 2023-11-08 DIAGNOSIS — E29.1 TESTICULAR HYPOGONADISM: ICD-10-CM

## 2023-11-08 NOTE — TELEPHONE ENCOUNTER
Requested medication(s) are due for refill today: Yes  Patient has already received a courtesy refill: Yes  Other reason request has been forwarded to provider:  Failed Protocol

## 2023-11-09 RX ORDER — TESTOSTERONE 20.25 MG/1.25G
GEL TOPICAL
Qty: 2 G | Refills: 0 | Status: SHIPPED | OUTPATIENT
Start: 2023-11-09

## 2023-11-11 DIAGNOSIS — F51.01 PRIMARY INSOMNIA: ICD-10-CM

## 2023-11-13 RX ORDER — TRAZODONE HYDROCHLORIDE 100 MG/1
TABLET ORAL
Qty: 135 TABLET | Refills: 1 | Status: SHIPPED | OUTPATIENT
Start: 2023-11-13

## 2023-12-14 ENCOUNTER — TELEPHONE (OUTPATIENT)
Dept: ENDOCRINOLOGY | Facility: CLINIC | Age: 43
End: 2023-12-14

## 2023-12-14 NOTE — TELEPHONE ENCOUNTER
JOCELYN PINA Case ID: [id number]  Need help?  Call us at [phone]  Status   Sent to [facility]  Drug    Testosterone 1.62% gel   Form    [facility] Ernesto PA Form

## 2023-12-19 ENCOUNTER — TELEPHONE (OUTPATIENT)
Dept: ENDOCRINOLOGY | Facility: CLINIC | Age: 43
End: 2023-12-19

## 2023-12-21 ENCOUNTER — TELEPHONE (OUTPATIENT)
Dept: ENDOCRINOLOGY | Facility: CLINIC | Age: 43
End: 2023-12-21

## 2023-12-21 DIAGNOSIS — E29.1 TESTICULAR HYPOGONADISM: ICD-10-CM

## 2023-12-21 RX ORDER — TESTOSTERONE 20.25 MG/1.25G
GEL TOPICAL
Qty: 2 G | Refills: 0 | Status: CANCELLED | OUTPATIENT
Start: 2023-12-21

## 2023-12-21 NOTE — TELEPHONE ENCOUNTER
JOCELYN IBARRA Key: [id number] - PA Case ID: [id number]  Need help? Call us at [phone]  Status   [facility]  [facility]    Testosterone 1.62% gel   Form    [facility] Electronic PA Form

## 2024-01-03 ENCOUNTER — OFFICE VISIT (OUTPATIENT)
Dept: ENDOCRINOLOGY | Facility: CLINIC | Age: 44
End: 2024-01-03
Payer: COMMERCIAL

## 2024-01-03 VITALS
HEIGHT: 68 IN | SYSTOLIC BLOOD PRESSURE: 110 MMHG | DIASTOLIC BLOOD PRESSURE: 80 MMHG | WEIGHT: 205.8 LBS | HEART RATE: 43 BPM | BODY MASS INDEX: 31.19 KG/M2

## 2024-01-03 DIAGNOSIS — E29.1 TESTICULAR HYPOGONADISM: Primary | ICD-10-CM

## 2024-01-03 DIAGNOSIS — E55.9 VITAMIN D DEFICIENCY: ICD-10-CM

## 2024-01-03 DIAGNOSIS — E78.2 MIXED HYPERLIPIDEMIA: ICD-10-CM

## 2024-01-03 PROCEDURE — 99214 OFFICE O/P EST MOD 30 MIN: CPT | Performed by: INTERNAL MEDICINE

## 2024-01-03 RX ORDER — OMEGA-3-ACID ETHYL ESTERS 1 G/1
2 CAPSULE, LIQUID FILLED ORAL 2 TIMES DAILY
Qty: 360 CAPSULE | Refills: 3 | Status: SHIPPED | OUTPATIENT
Start: 2024-01-03 | End: 2024-12-28

## 2024-01-03 RX ORDER — CHLORAL HYDRATE 500 MG
1000 CAPSULE ORAL DAILY
COMMUNITY
End: 2024-01-03

## 2024-01-03 RX ORDER — RIBOFLAVIN (VITAMIN B2) 100 MG
100 TABLET ORAL DAILY
COMMUNITY

## 2024-01-03 RX ORDER — CHOLECALCIFEROL (VITAMIN D3) 125 MCG
500 CAPSULE ORAL DAILY
COMMUNITY

## 2024-01-03 NOTE — PROGRESS NOTES
Assessment/Plan:    Testicular hypogonadism  He is doing well with testosterone replacement.  Most recent testosterone level was in the mid normal range.  Continue AndroGel.  He did sign the consent form for testosterone supplementation today.    Vitamin D deficiency  Continue vitamin D supplementation.  The most recent level was in the 50s.  This is at target.    Mixed hyperlipidemia  We did change his over-the-counter fish oil to prescription fish oil.  We discussed other methods to decrease LDL which included diet and exercise.  He is already doing much of this.  If the LDL remains persistently elevated, 1 could consider a statin.     Diagnoses and all orders for this visit:    Testicular hypogonadism  -     Testosterone, free, total Lab Collect; Future  -     CBC and differential Lab Collect; Future  -     Testosterone, free, total Lab Collect  -     CBC and differential Lab Collect    Vitamin D deficiency    Mixed hyperlipidemia  -     omega-3-acid ethyl esters (LOVAZA) 1 g capsule; Take 2 capsules (2 g total) by mouth 2 (two) times a day  -     Lipid panel Lab Collect Lab Collect; Future  -     Lipid panel Lab Collect Lab Collect    Other orders  -     Ascorbic Acid (vitamin C) 100 MG tablet; Take 100 mg by mouth daily  -     Discontinue: Omega-3 Fatty Acids (fish oil) 1,000 mg; Take 1,000 mg by mouth daily  -     vitamin B-12 (VITAMIN B-12) 500 mcg tablet; Take 500 mcg by mouth daily          Subjective:      Patient ID: Oj Ashley is a 43 y.o. male.    43-year-old man with hypogonadism presents for follow-up.  He is currently on AndroGel 1.62%.  He feels well and has no complaints.        The following portions of the patient's history were reviewed and updated as appropriate: allergies, current medications, past family history, past medical history, past social history, past surgical history, and problem list.    Review of Systems   Constitutional:  Negative for chills and fever.   Respiratory:  Negative  "for shortness of breath.    Cardiovascular:  Negative for chest pain.   Gastrointestinal:  Negative for constipation, diarrhea, nausea and vomiting.   All other systems reviewed and are negative.        Objective:      /80   Pulse (!) 43   Ht 5' 8\" (1.727 m)   Wt 93.4 kg (205 lb 12.8 oz)   BMI 31.29 kg/m²          Physical Exam  Vitals reviewed.   Constitutional:       General: He is not in acute distress.     Appearance: He is well-developed. He is not diaphoretic.   HENT:      Head: Normocephalic and atraumatic.      Mouth/Throat:      Pharynx: No oropharyngeal exudate.   Eyes:      General: Lids are normal. No scleral icterus.        Right eye: No discharge.         Left eye: No discharge.      Conjunctiva/sclera: Conjunctivae normal.   Neck:      Thyroid: No thyromegaly.   Cardiovascular:      Rate and Rhythm: Normal rate and regular rhythm.      Heart sounds: Normal heart sounds. No murmur heard.     No friction rub. No gallop.   Pulmonary:      Effort: Pulmonary effort is normal. No respiratory distress.      Breath sounds: Normal breath sounds.   Abdominal:      General: Bowel sounds are normal. There is no distension.      Palpations: Abdomen is soft.   Musculoskeletal:         General: No tenderness or deformity. Normal range of motion.      Cervical back: Neck supple.   Lymphadenopathy:      Head:      Right side of head: No occipital adenopathy.      Left side of head: No occipital adenopathy.      Upper Body:      Right upper body: No supraclavicular adenopathy.      Left upper body: No supraclavicular adenopathy.   Skin:     General: Skin is warm.      Findings: No erythema or rash.   Neurological:      Mental Status: He is alert and oriented to person, place, and time.      Cranial Nerves: No cranial nerve deficit.      Coordination: Coordination normal.   Psychiatric:         Mood and Affect: Mood normal.         Behavior: Behavior normal.           "

## 2024-01-03 NOTE — ASSESSMENT & PLAN NOTE
He is doing well with testosterone replacement.  Most recent testosterone level was in the mid normal range.  Continue AndroGel.  He did sign the consent form for testosterone supplementation today.

## 2024-01-03 NOTE — ASSESSMENT & PLAN NOTE
We did change his over-the-counter fish oil to prescription fish oil.  We discussed other methods to decrease LDL which included diet and exercise.  He is already doing much of this.  If the LDL remains persistently elevated, 1 could consider a statin.

## 2024-01-22 ENCOUNTER — TELEPHONE (OUTPATIENT)
Dept: ENDOCRINOLOGY | Facility: CLINIC | Age: 44
End: 2024-01-22

## 2024-01-22 NOTE — TELEPHONE ENCOUNTER
JOCELYN IBARRA (Key: [id number])  PA Case ID #: [id number]  Need Help? Call us at [phone]  Status  Sent to Plan today  Drug  Omega-3-acid Ethyl Esters 1GM capsules    Form  [employer] Electronic PA Form

## 2024-01-26 DIAGNOSIS — E29.1 TESTICULAR HYPOGONADISM: ICD-10-CM

## 2024-01-26 RX ORDER — TESTOSTERONE 20.25 MG/1.25G
GEL TOPICAL
Qty: 75 G | Refills: 1 | Status: SHIPPED | OUTPATIENT
Start: 2024-01-26

## 2024-03-02 DIAGNOSIS — E29.1 TESTICULAR HYPOGONADISM: ICD-10-CM

## 2024-03-05 RX ORDER — TESTOSTERONE 20.25 MG/1.25G
GEL TOPICAL
Qty: 75 G | Refills: 1 | Status: SHIPPED | OUTPATIENT
Start: 2024-03-05

## 2024-03-12 DIAGNOSIS — E78.2 MIXED HYPERLIPIDEMIA: ICD-10-CM

## 2024-03-12 RX ORDER — OMEGA-3-ACID ETHYL ESTERS 1 G/1
2 CAPSULE, LIQUID FILLED ORAL 2 TIMES DAILY
Qty: 360 CAPSULE | Refills: 1 | Status: SHIPPED | OUTPATIENT
Start: 2024-03-12

## 2024-03-22 DIAGNOSIS — F51.01 PRIMARY INSOMNIA: ICD-10-CM

## 2024-03-22 RX ORDER — TRAZODONE HYDROCHLORIDE 100 MG/1
TABLET ORAL
Qty: 135 TABLET | Refills: 1 | Status: SHIPPED | OUTPATIENT
Start: 2024-03-22

## 2024-03-27 DIAGNOSIS — E55.9 VITAMIN D DEFICIENCY: Primary | ICD-10-CM

## 2024-03-27 DIAGNOSIS — Z00.00 HEALTH CARE MAINTENANCE: ICD-10-CM

## 2024-03-27 RX ORDER — CHOLECALCIFEROL (VITAMIN D3) 125 MCG
500 CAPSULE ORAL DAILY
Qty: 30 TABLET | Refills: 0 | Status: SHIPPED | OUTPATIENT
Start: 2024-03-27

## 2024-03-27 RX ORDER — RIBOFLAVIN (VITAMIN B2) 100 MG
100 TABLET ORAL DAILY
Qty: 30 TABLET | Refills: 0 | Status: SHIPPED | OUTPATIENT
Start: 2024-03-27

## 2024-04-03 ENCOUNTER — OFFICE VISIT (OUTPATIENT)
Dept: FAMILY MEDICINE CLINIC | Facility: CLINIC | Age: 44
End: 2024-04-03
Payer: COMMERCIAL

## 2024-04-03 VITALS
OXYGEN SATURATION: 99 % | WEIGHT: 202 LBS | DIASTOLIC BLOOD PRESSURE: 78 MMHG | SYSTOLIC BLOOD PRESSURE: 120 MMHG | TEMPERATURE: 97.7 F | HEIGHT: 68 IN | BODY MASS INDEX: 30.62 KG/M2 | HEART RATE: 49 BPM

## 2024-04-03 DIAGNOSIS — E55.9 VITAMIN D DEFICIENCY: ICD-10-CM

## 2024-04-03 DIAGNOSIS — Z12.11 SCREENING FOR COLON CANCER: ICD-10-CM

## 2024-04-03 DIAGNOSIS — E78.2 MIXED HYPERLIPIDEMIA: ICD-10-CM

## 2024-04-03 DIAGNOSIS — E29.1 TESTICULAR HYPOGONADISM: Primary | ICD-10-CM

## 2024-04-03 DIAGNOSIS — Z13.1 SCREENING FOR DIABETES MELLITUS: ICD-10-CM

## 2024-04-03 DIAGNOSIS — F51.04 PSYCHOPHYSIOLOGICAL INSOMNIA: ICD-10-CM

## 2024-04-03 PROCEDURE — 99204 OFFICE O/P NEW MOD 45 MIN: CPT | Performed by: FAMILY MEDICINE

## 2024-04-03 RX ORDER — TESTOSTERONE 20.25 MG/1.25G
GEL TOPICAL
Qty: 75 G | Refills: 1 | Status: SHIPPED | OUTPATIENT
Start: 2024-04-03

## 2024-04-03 NOTE — PROGRESS NOTES
Name: Oj Ashley      : 1980      MRN: [id number]  Encounter Provider: Dane Lopez MD  Encounter Date: 4/3/2024   Encounter department: [facility]    Assessment & Plan     1. Testicular hypogonadism  -     Ambulatory Referral to Endocrinology; Future  -     Comprehensive metabolic panel; Future  -     PSA, Total Screen; Future  -     CBC and differential; Future  -     Testosterone, free, total; Future  -     CBC and differential; Future; Expected date: 2024  After discussing risks and benefits of medication along with side effects will start the following:   -     Testosterone 1.62 % GEL; Apply 2 actuations topically every morning  F/U with endo    2. Screening for colon cancer  -     Ambulatory Referral to Gastroenterology; Future    3. Vitamin D deficiency    4. Mixed hyperlipidemia  -     Lipid panel; Future    5. Screening for diabetes mellitus  -     Comprehensive metabolic panel; Future    6. Psychophysiological insomnia  Continue trazodone    F/U in 6 months         Subjective     Patient is here to establish care.  He has a hx of male hypogonadism. Has been taking testosterone replacement therapy which has been helping him. Had blood work done 10/2023 and all within normal. Used to see Dr. De Guzman however would like to switch offices.  Also has insomnia and takes trazodone which helps.      Review of Systems   Constitutional:  Negative for activity change, appetite change, fatigue and fever.   HENT:  Negative for congestion and ear discharge.    Respiratory:  Negative for cough and shortness of breath.    Cardiovascular:  Negative for chest pain and palpitations.   Gastrointestinal:  Negative for diarrhea and nausea.   Musculoskeletal:  Negative for arthralgias and back pain.   Skin:  Negative for color change and rash.   Neurological:  Negative for dizziness and headaches.   Psychiatric/Behavioral:  Positive for sleep disturbance. Negative for agitation and behavioral  problems.        Past Medical History:   Diagnosis Date    Hypogonadism in male      Past Surgical History:   Procedure Laterality Date    TOOTH EXTRACTION      WISDOM TOOTH EXTRACTION       Family History   Problem Relation Age of Onset    Multiple sclerosis Mother      Social History     Socioeconomic History    Marital status: /Civil Union     Spouse name: None    Number of children: None    Years of education: None    Highest education level: None   Occupational History    Occupation: laboror   Tobacco Use    Smoking status: Never    Smokeless tobacco: Never   Vaping Use    Vaping status: Never Used   Substance and Sexual Activity    Alcohol use: Yes     Alcohol/week: 2.0 standard drinks of alcohol     Types: 2 Cans of beer per week     Comment: social    Drug use: Never    Sexual activity: Yes     Partners: Female     Birth control/protection: Male Sterilization   Other Topics Concern    None   Social History Narrative        Regular dental care    Good dental hygiene    Caffeine use     Social Determinants of Health     Financial Resource Strain: Not on file   Food Insecurity: Not on file   Transportation Needs: Not on file   Physical Activity: Not on file   Stress: Not on file   Social Connections: Not on file   Intimate Partner Violence: Not on file   Housing Stability: Not on file     Current Outpatient Medications on File Prior to Visit   Medication Sig    Ascorbic Acid (vitamin C) 100 MG tablet Take 1 tablet (100 mg total) by mouth daily    Cholecalciferol (Vitamin D3) 125 MCG (5000 UT) capsule Take 1 capsule (5,000 Units total) by mouth daily    omega-3-acid ethyl esters (LOVAZA) 1 g capsule TAKE 2 CAPSULES BY MOUTH TWICE A DAY    traZODone (DESYREL) 100 mg tablet take 1 and 1/2 tablets by mouth daily at bedtime    vitamin B-12 (VITAMIN B-12) 500 mcg tablet Take 1 tablet (500 mcg total) by mouth daily    [DISCONTINUED] Testosterone 1.62 % GEL Apply 2 actuations topically every morning  "    No Known Allergies  Immunization History   Administered Date(s) Administered    COVID-19 MODERNA VACC 0.5 ML IM 03/31/2021, 04/28/2021    INFLUENZA 11/13/2018       Objective     /78 (BP Location: Left arm, Patient Position: Sitting, Cuff Size: Large)   Pulse (!) 49   Temp 97.7 °F (36.5 °C)   Ht 5' 8\" (1.727 m)   Wt 91.6 kg (202 lb)   SpO2 99%   BMI 30.71 kg/m²     Physical Exam  Constitutional:       General: He is not in acute distress.     Appearance: He is well-developed. He is not diaphoretic.   Eyes:      General: No scleral icterus.     Pupils: Pupils are equal, round, and reactive to light.   Cardiovascular:      Rate and Rhythm: Normal rate and regular rhythm.      Heart sounds: Normal heart sounds. No murmur heard.  Pulmonary:      Effort: Pulmonary effort is normal. No respiratory distress.      Breath sounds: Normal breath sounds. No wheezing.   Abdominal:      General: Bowel sounds are normal. There is no distension.      Palpations: Abdomen is soft.      Tenderness: There is no abdominal tenderness.   Skin:     General: Skin is warm and dry.      Findings: No rash.   Neurological:      Mental Status: He is alert and oriented to person, place, and time.       Dane Lopez MD    "

## 2024-04-15 ENCOUNTER — TELEPHONE (OUTPATIENT)
Age: 44
End: 2024-04-15

## 2024-04-15 ENCOUNTER — PREP FOR PROCEDURE (OUTPATIENT)
Age: 44
End: 2024-04-15

## 2024-04-15 DIAGNOSIS — Z12.11 SCREENING FOR COLON CANCER: Primary | ICD-10-CM

## 2024-04-15 NOTE — TELEPHONE ENCOUNTER
04/15/24  Screened by: Ellie Yin    Referring Provider     Pre- Screening:     There is no height or weight on file to calculate BMI.28.9  Height 5'8  Weight 190lbs  Has patient been referred for a routine screening Colonoscopy? yes  Is the patient between 45-75 years old? yes      Previous Colonoscopy no   If yes:    Date:     Facility:     Reason:     Does the patient want to see a Gastroenterologist prior to their procedure OR are they having any GI symptoms? no    Has the patient been hospitalized or had abdominal surgery in the past 6 months? no    Does the patient use supplemental oxygen? no    Does the patient take Coumadin, Lovenox, Plavix, Elliquis, Xarelto, or other blood thinning medication? no    Has the patient had a stroke, cardiac event, or stent placed in the past year? no      If patient is between 45yrs - 49yrs, please advise patient that we will have to confirm benefits & coverage with their insurance company for a routine screening colonoscopy.

## 2024-04-15 NOTE — TELEPHONE ENCOUNTER
Scheduled date of colonoscopy (as of today):05/13/2024  Physician performing colonoscopy:Dr. Giraldo  Location of colonoscopy:[facility]  Bowel prep reviewed with patient:Inocente/Dul  Instructions reviewed with patient by:Inocente/Dul prep instructions sent via email to [email or web address] and [facility].  Clearances: n/a         :  Yani Ashley (Spouse)  [phone]

## 2024-04-23 DIAGNOSIS — E78.2 MIXED HYPERLIPIDEMIA: ICD-10-CM

## 2024-04-23 DIAGNOSIS — F51.01 PRIMARY INSOMNIA: ICD-10-CM

## 2024-04-24 RX ORDER — OMEGA-3-ACID ETHYL ESTERS 1 G/1
2 CAPSULE, LIQUID FILLED ORAL 2 TIMES DAILY
Qty: 360 CAPSULE | Refills: 1 | Status: SHIPPED | OUTPATIENT
Start: 2024-04-24

## 2024-04-24 RX ORDER — TRAZODONE HYDROCHLORIDE 100 MG/1
TABLET ORAL
Qty: 135 TABLET | Refills: 1 | Status: SHIPPED | OUTPATIENT
Start: 2024-04-24

## 2024-05-07 DIAGNOSIS — Z12.11 SCREENING FOR COLON CANCER: Primary | ICD-10-CM

## 2024-05-07 RX ORDER — POLYETHYLENE GLYCOL 3350 17 G/17G
238 POWDER, FOR SOLUTION ORAL ONCE
Qty: 238 G | Refills: 0 | Status: SHIPPED | OUTPATIENT
Start: 2024-05-07 | End: 2024-05-07

## 2024-05-07 RX ORDER — BISACODYL 5 MG/1
5 TABLET, DELAYED RELEASE ORAL DAILY PRN
Qty: 4 TABLET | Refills: 0 | Status: SHIPPED | OUTPATIENT
Start: 2024-05-07

## 2024-05-10 ENCOUNTER — TELEPHONE (OUTPATIENT)
Dept: GASTROENTEROLOGY | Facility: CLINIC | Age: 44
End: 2024-05-10

## 2024-05-13 ENCOUNTER — ANESTHESIA (OUTPATIENT)
Dept: GASTROENTEROLOGY | Facility: HOSPITAL | Age: 44
End: 2024-05-13

## 2024-05-13 ENCOUNTER — ANESTHESIA EVENT (OUTPATIENT)
Dept: GASTROENTEROLOGY | Facility: HOSPITAL | Age: 44
End: 2024-05-13

## 2024-05-13 ENCOUNTER — HOSPITAL ENCOUNTER (OUTPATIENT)
Dept: GASTROENTEROLOGY | Facility: HOSPITAL | Age: 44
Setting detail: OUTPATIENT SURGERY
Discharge: HOME | End: 2024-05-13
Attending: INTERNAL MEDICINE
Payer: COMMERCIAL

## 2024-05-13 VITALS
TEMPERATURE: 98 F | OXYGEN SATURATION: 99 % | SYSTOLIC BLOOD PRESSURE: 109 MMHG | WEIGHT: 191.8 LBS | DIASTOLIC BLOOD PRESSURE: 60 MMHG | HEIGHT: 68 IN | BODY MASS INDEX: 29.07 KG/M2 | HEART RATE: 45 BPM | RESPIRATION RATE: 18 BRPM

## 2024-05-13 DIAGNOSIS — Z12.11 SCREENING FOR COLON CANCER: ICD-10-CM

## 2024-05-13 PROCEDURE — 45385 COLONOSCOPY W/LESION REMOVAL: CPT | Performed by: INTERNAL MEDICINE

## 2024-05-13 PROCEDURE — 88305 TISSUE EXAM BY PATHOLOGIST: CPT | Performed by: PATHOLOGY

## 2024-05-13 RX ORDER — PROPOFOL 10 MG/ML
INJECTION, EMULSION INTRAVENOUS AS NEEDED
Status: DISCONTINUED | OUTPATIENT
Start: 2024-05-13 | End: 2024-05-13

## 2024-05-13 RX ORDER — LIDOCAINE HCL/PF 100 MG/5ML
SYRINGE (ML) INJECTION AS NEEDED
Status: DISCONTINUED | OUTPATIENT
Start: 2024-05-13 | End: 2024-05-13

## 2024-05-13 RX ORDER — SODIUM CHLORIDE, SODIUM LACTATE, POTASSIUM CHLORIDE, CALCIUM CHLORIDE 600; 310; 30; 20 MG/100ML; MG/100ML; MG/100ML; MG/100ML
INJECTION, SOLUTION INTRAVENOUS CONTINUOUS PRN
Status: DISCONTINUED | OUTPATIENT
Start: 2024-05-13 | End: 2024-05-13

## 2024-05-13 RX ADMIN — PROPOFOL 50 MG: 10 INJECTION, EMULSION INTRAVENOUS at 13:25

## 2024-05-13 RX ADMIN — PROPOFOL 100 MG: 10 INJECTION, EMULSION INTRAVENOUS at 13:23

## 2024-05-13 RX ADMIN — PROPOFOL 50 MG: 10 INJECTION, EMULSION INTRAVENOUS at 13:30

## 2024-05-13 RX ADMIN — PROPOFOL 50 MG: 10 INJECTION, EMULSION INTRAVENOUS at 13:26

## 2024-05-13 RX ADMIN — PROPOFOL 50 MG: 10 INJECTION, EMULSION INTRAVENOUS at 13:38

## 2024-05-13 RX ADMIN — PROPOFOL 50 MG: 10 INJECTION, EMULSION INTRAVENOUS at 13:34

## 2024-05-13 RX ADMIN — PROPOFOL 50 MG: 10 INJECTION, EMULSION INTRAVENOUS at 13:28

## 2024-05-13 RX ADMIN — SODIUM CHLORIDE, SODIUM LACTATE, POTASSIUM CHLORIDE, AND CALCIUM CHLORIDE: .6; .31; .03; .02 INJECTION, SOLUTION INTRAVENOUS at 12:41

## 2024-05-13 RX ADMIN — LIDOCAINE HYDROCHLORIDE 50 MG: 20 INJECTION, SOLUTION INTRAVENOUS at 13:23

## 2024-05-13 NOTE — H&P
"History and Physical -  [facility]  Oj Ashley 43 y.o. male MRN: [id number]      HPI: Oj Ashley is a 43 y.o. year old male who presents for screening colonoscopy      REVIEW OF SYSTEMS: Per the HPI, and otherwise unremarkable.    Historical Information   Past Medical History:   Diagnosis Date    Hypogonadism in male      Past Surgical History:   Procedure Laterality Date    TOOTH EXTRACTION      WISDOM TOOTH EXTRACTION       Social History   Social History     Substance and Sexual Activity   Alcohol Use Yes    Alcohol/week: 2.0 standard drinks of alcohol    Types: 2 Cans of beer per week    Comment: social     Social History     Substance and Sexual Activity   Drug Use Never     Social History     Tobacco Use   Smoking Status Never   Smokeless Tobacco Never     Family History   Problem Relation Age of Onset    Multiple sclerosis Mother        Meds/Allergies     (Not in a hospital admission)      No Known Allergies    Objective     Blood pressure 121/62, pulse (!) 43, temperature 97.7 °F (36.5 °C), resp. rate 18, height 5' 8\" (1.727 m), weight 87 kg (191 lb 12.8 oz), SpO2 98%.      PHYSICAL EXAM    Gen: NAD  CV: RRR  CHEST: Clear  ABD: soft, NT/ND  EXT: no edema      ASSESSMENT/PLAN:  This is a 43 y.o. year old male here for colonoscopy, and he is stable and optimized for his procedure.          "

## 2024-05-13 NOTE — ANESTHESIA POSTPROCEDURE EVALUATION
Post-Op Assessment Note    CV Status:  Stable  Pain Score: 0    Pain management: adequate       Mental Status:  Sleepy and arousable   Hydration Status:  Stable   PONV Controlled:  Controlled   Airway Patency:  Patent     Post Op Vitals Reviewed: Yes    No anethesia notable event occurred.    Staff: CRNA               BP   101/56   Temp      Pulse  51   Resp   11   SpO2   98

## 2024-05-13 NOTE — ANESTHESIA PREPROCEDURE EVALUATION
Procedure:  COLONOSCOPY    Relevant Problems   CARDIO   (+) Mixed hyperlipidemia      NEURO/PSYCH   (+) JASMINE (generalized anxiety disorder)        Physical Exam    Airway    Mallampati score: I  TM Distance: >3 FB       Dental   No notable dental hx     Cardiovascular  Rhythm: regular, Rate: normal, Cardiovascular exam normal    Pulmonary  Pulmonary exam normal Breath sounds clear to auscultation    Other Findings        Anesthesia Plan  ASA Score- 1     Anesthesia Type- IV sedation with anesthesia with ASA Monitors.         Additional Monitors:     Airway Plan:            Plan Factors-Exercise tolerance (METS): >4 METS.    Chart reviewed. EKG reviewed. Imaging results reviewed. Existing labs reviewed. Patient summary reviewed.    Patient is not a current smoker.  Patient did not smoke on day of surgery.            Induction- intravenous.    Postoperative Plan-     Informed Consent- Anesthetic plan and risks discussed with patient.  I personally reviewed this patient with the CRNA. Discussed and agreed on the Anesthesia Plan with the CRNA..

## 2024-05-22 ENCOUNTER — TELEPHONE (OUTPATIENT)
Dept: GASTROENTEROLOGY | Facility: CLINIC | Age: 44
End: 2024-05-22

## 2024-05-22 NOTE — TELEPHONE ENCOUNTER
----- Message from Kin Giraldo DO sent at 5/22/2024  7:25 AM EDT -----  Patient has not read [employer] message. Please call and share results.

## 2024-05-22 NOTE — TELEPHONE ENCOUNTER
Called [facility] advising biopsy results. If any questions/concerns to call the office.          Oj,     Your polyps showed a precancerous adenoma at 30 cm within the sigmoid colon that was removed completely.  The other polyp that was identified was a benign hyperplastic polyp.  Your next colonoscopy will be due in 2 years because of some focal high-grade dysplasia arising from that precancerous polyp.   Written by Kin Giraldo DO on 5/17/2024 12:08 PM EDT

## 2024-06-20 DIAGNOSIS — E29.1 TESTICULAR HYPOGONADISM: ICD-10-CM

## 2024-06-20 RX ORDER — TESTOSTERONE 20.25 MG/1.25G
GEL TOPICAL
Qty: 75 G | Refills: 0 | Status: SHIPPED | OUTPATIENT
Start: 2024-06-20

## 2024-07-07 LAB
ALBUMIN SERPL-MCNC: 4.4 G/DL (ref 3.6–5.1)
ALBUMIN/GLOB SERPL: 1.8 (CALC) (ref 1–2.5)
ALP SERPL-CCNC: 42 U/L (ref 36–130)
ALT SERPL-CCNC: 16 U/L (ref 9–46)
AST SERPL-CCNC: 24 U/L (ref 10–40)
BASOPHILS # BLD AUTO: 30 CELLS/UL (ref 0–200)
BASOPHILS NFR BLD AUTO: 0.5 %
BILIRUB SERPL-MCNC: 1.2 MG/DL (ref 0.2–1.2)
BUN SERPL-MCNC: 14 MG/DL (ref 7–25)
BUN/CREAT SERPL: NORMAL (CALC) (ref 6–22)
CALCIUM SERPL-MCNC: 9.6 MG/DL (ref 8.6–10.3)
CHLORIDE SERPL-SCNC: 104 MMOL/L (ref 98–110)
CHOLEST SERPL-MCNC: 229 MG/DL
CHOLEST/HDLC SERPL: 3.9 (CALC)
CO2 SERPL-SCNC: 29 MMOL/L (ref 20–32)
CREAT SERPL-MCNC: 1 MG/DL (ref 0.6–1.29)
EOSINOPHIL # BLD AUTO: 60 CELLS/UL (ref 15–500)
EOSINOPHIL NFR BLD AUTO: 1 %
ERYTHROCYTE [DISTWIDTH] IN BLOOD BY AUTOMATED COUNT: 12.5 % (ref 11–15)
GFR/BSA.PRED SERPLBLD CYS-BASED-ARV: 95 ML/MIN/1.73M2
GLOBULIN SER CALC-MCNC: 2.4 G/DL (CALC) (ref 1.9–3.7)
GLUCOSE SERPL-MCNC: 98 MG/DL (ref 65–99)
HCT VFR BLD AUTO: 42.9 % (ref 38.5–50)
HDLC SERPL-MCNC: 59 MG/DL
HGB BLD-MCNC: 14.3 G/DL (ref 13.2–17.1)
LDLC SERPL CALC-MCNC: 150 MG/DL (CALC)
LYMPHOCYTES # BLD AUTO: 2550 CELLS/UL (ref 850–3900)
LYMPHOCYTES NFR BLD AUTO: 42.5 %
MCH RBC QN AUTO: 30.1 PG (ref 27–33)
MCHC RBC AUTO-ENTMCNC: 33.3 G/DL (ref 32–36)
MCV RBC AUTO: 90.3 FL (ref 80–100)
MONOCYTES # BLD AUTO: 426 CELLS/UL (ref 200–950)
MONOCYTES NFR BLD AUTO: 7.1 %
NEUTROPHILS # BLD AUTO: 2934 CELLS/UL (ref 1500–7800)
NEUTROPHILS NFR BLD AUTO: 48.9 %
NONHDLC SERPL-MCNC: 170 MG/DL (CALC)
PLATELET # BLD AUTO: 213 THOUSAND/UL (ref 140–400)
PMV BLD REES-ECKER: 11 FL (ref 7.5–12.5)
POTASSIUM SERPL-SCNC: 4.1 MMOL/L (ref 3.5–5.3)
PROT SERPL-MCNC: 6.8 G/DL (ref 6.1–8.1)
PSA SERPL-MCNC: 0.81 NG/ML
RBC # BLD AUTO: 4.75 MILLION/UL (ref 4.2–5.8)
SODIUM SERPL-SCNC: 140 MMOL/L (ref 135–146)
TESTOST FREE SERPL-MCNC: 90.2 PG/ML (ref 35–155)
TESTOST SERPL-MCNC: 710 NG/DL (ref 250–1100)
TRIGL SERPL-MCNC: 92 MG/DL
WBC # BLD AUTO: 6 THOUSAND/UL (ref 3.8–10.8)

## 2024-07-08 ENCOUNTER — TELEPHONE (OUTPATIENT)
Dept: FAMILY MEDICINE CLINIC | Facility: CLINIC | Age: 44
End: 2024-07-08

## 2024-07-08 NOTE — TELEPHONE ENCOUNTER
----- Message from Dane Lopez MD sent at 7/8/2024 11:21 AM EDT -----  Testosterone level is normal recommend to discuss with endo.

## 2024-08-07 DIAGNOSIS — F51.01 PRIMARY INSOMNIA: ICD-10-CM

## 2024-08-08 ENCOUNTER — TELEPHONE (OUTPATIENT)
Age: 44
End: 2024-08-08

## 2024-08-08 DIAGNOSIS — E29.1 TESTICULAR HYPOGONADISM: ICD-10-CM

## 2024-08-08 RX ORDER — TRAZODONE HYDROCHLORIDE 100 MG/1
TABLET ORAL
Qty: 135 TABLET | Refills: 1 | Status: SHIPPED | OUTPATIENT
Start: 2024-08-08

## 2024-08-08 RX ORDER — TESTOSTERONE 20.25 MG/1.25G
GEL TOPICAL
Qty: 75 G | Refills: 1 | Status: SHIPPED | OUTPATIENT
Start: 2024-08-08 | End: 2024-08-12 | Stop reason: SDUPTHER

## 2024-08-08 NOTE — TELEPHONE ENCOUNTER
Reason for call:   [x] Refill   [] Prior Auth  [x] Other: Pt has been without his meds and pcp declined his meds    Office:   [] PCP/Provider -   [x] Specialty/Provider - [facility]    Medication: Testosterone 1.62 % GEL     Pharmacy: [facility]/pharmacy #[id number] - [place] - [address]     Does the patient have enough for 3 days?   [] Yes   [x] No - Send as HP to POD

## 2024-08-08 NOTE — TELEPHONE ENCOUNTER
Requested medication(s) are due for refill today: Yes  Patient has already received a courtesy refill: No  Other reason request has been forwarded to provider: Guidelines failed. Provider must fill this. Patient saw you last.

## 2024-08-08 NOTE — TELEPHONE ENCOUNTER
Pt is upset that he cannot place an refill order for   Testosterone 1.62 % GEL anymore on my chart. It has a block   He was advised by someone on the refill line that Dr. Macdonald placed a stop on it?    I told him to call [facility]!  stated no someone here needs to find out why?

## 2024-08-08 NOTE — TELEPHONE ENCOUNTER
8/8/2024 per pt he said that you took the ability for him to request in my chart for a refill on his Testosterone 1.62% gel that is ordered by his endo Dr De Guzman in [place] he called there office and that is what he was told by them. They did refill it for him but his point is that now he can not request a refill from there office next month because of what you did.

## 2024-08-12 DIAGNOSIS — E29.1 TESTICULAR HYPOGONADISM: ICD-10-CM

## 2024-08-14 RX ORDER — TESTOSTERONE 20.25 MG/1.25G
GEL TOPICAL
Qty: 75 G | Refills: 0 | Status: SHIPPED | OUTPATIENT
Start: 2024-08-14

## 2024-08-14 NOTE — TELEPHONE ENCOUNTER
Requested medication(s) are due for refill today: No  Patient has already received a courtesy refill: No  Other reason request has been forwarded to provider: please refuse, refill too soon, order was placed on 8/8/24

## 2024-09-09 ENCOUNTER — OFFICE VISIT (OUTPATIENT)
Dept: ENDOCRINOLOGY | Facility: CLINIC | Age: 44
End: 2024-09-09
Payer: COMMERCIAL

## 2024-09-09 VITALS
TEMPERATURE: 98.1 F | SYSTOLIC BLOOD PRESSURE: 104 MMHG | HEIGHT: 68 IN | OXYGEN SATURATION: 99 % | DIASTOLIC BLOOD PRESSURE: 70 MMHG | BODY MASS INDEX: 29.19 KG/M2 | HEART RATE: 48 BPM | WEIGHT: 192.6 LBS

## 2024-09-09 DIAGNOSIS — E55.9 VITAMIN D DEFICIENCY: ICD-10-CM

## 2024-09-09 DIAGNOSIS — E29.1 TESTICULAR HYPOGONADISM: ICD-10-CM

## 2024-09-09 DIAGNOSIS — Z79.890 LONG-TERM CURRENT USE OF TESTOSTERONE REPLACEMENT THERAPY: Primary | ICD-10-CM

## 2024-09-09 DIAGNOSIS — E78.2 MIXED HYPERLIPIDEMIA: ICD-10-CM

## 2024-09-09 PROCEDURE — 99214 OFFICE O/P EST MOD 30 MIN: CPT | Performed by: STUDENT IN AN ORGANIZED HEALTH CARE EDUCATION/TRAINING PROGRAM

## 2024-09-09 RX ORDER — TESTOSTERONE 20.25 MG/1.25G
GEL TOPICAL
Qty: 75 G | Refills: 0 | Status: SHIPPED | OUTPATIENT
Start: 2024-09-09

## 2024-09-09 NOTE — ASSESSMENT & PLAN NOTE
Testicular hypogonadism on long-term current use of testosterone replacement therapy, with AndroGel 1.62%, 2 actuations every morning, most recent labs with total testosterone of 710 ng/d, PSA of 0.81 and H&H of 14.3/42.9.  He feels well, we discussed testosterone goal between 500-700 in order to avoid adverse reactions, including polycythemia, dyslipidemia, increased risk of BPH, prostate cancer, mood changes and aggression on higher dose.  As his level was within goal on the same dose, we will continue testosterone at current dose.  We will repeat testosterone free and total as well as CBC in 3 to 6 months.  Return back in 6 months.

## 2024-09-09 NOTE — PROGRESS NOTES
Oj Ashley 43 y.o. male MRN: [id number]    Encounter: [id number]      Assessment & Plan     Problem List Items Addressed This Visit          Endocrine    Testicular hypogonadism     Testicular hypogonadism on long-term current use of testosterone replacement therapy, with AndroGel 1.62%, 2 actuations every morning, most recent labs with total testosterone of 710 ng/d, PSA of 0.81 and H&H of 14.3/42.9.  He feels well, we discussed testosterone goal between 500-700 in order to avoid adverse reactions, including polycythemia, dyslipidemia, increased risk of BPH, prostate cancer, mood changes and aggression on higher dose.  As his level was within goal on the same dose, we will continue testosterone at current dose.  We will repeat testosterone free and total as well as CBC in 3 to 6 months.  Return back in 6 months.           Relevant Medications    Testosterone 1.62 % GEL    Other Relevant Orders    CBC and Platelet    Testosterone, Free+Total LC/MS       Other    Vitamin D deficiency     Continue vitamin D supplementation 5000 IU daily.         Mixed hyperlipidemia     Continue fish oil.         Long-term current use of testosterone replacement therapy - Primary     See plan for hypogonadism.         Relevant Orders    CBC and Platelet    Testosterone, Free+Total LC/MS         CC:   Hypogonadism    History of Present Illness     HPI:  Oj Ashley is a 43 year old male with history of testicular hypogonadism, on long-term current use of testosterone replacement therapy who presented to establish care with us.  He was following with [place] office,  He is currently on AndroGel 1.62% 2 actuations topically every morning.  His most recent labs showed testosterone of 710 ng/dl, PSA of 0.81 ng/ml, and H&H of 14.3/42.9.  He feels well.        Review of Systems   Constitutional:  Negative for appetite change, fatigue and unexpected weight change.   HENT:  Negative for trouble swallowing.    Eyes:  Negative for visual  disturbance.   Cardiovascular:  Negative for chest pain and palpitations.   Gastrointestinal:  Negative for abdominal pain, constipation, diarrhea, nausea and vomiting.   Endocrine: Negative for polydipsia, polyphagia and polyuria.       Historical Information   Past Medical History:   Diagnosis Date    Hypogonadism in male      Past Surgical History:   Procedure Laterality Date    TOOTH EXTRACTION      WISDOM TOOTH EXTRACTION       Social History   Social History     Substance and Sexual Activity   Alcohol Use Yes    Alcohol/week: 2.0 standard drinks of alcohol    Types: 2 Cans of beer per week    Comment: social     Social History     Substance and Sexual Activity   Drug Use Never     Social History     Tobacco Use   Smoking Status Never   Smokeless Tobacco Never     Family History:   Family History   Problem Relation Age of Onset    Multiple sclerosis Mother        Meds/Allergies   Current Outpatient Medications   Medication Sig Dispense Refill    Ascorbic Acid (vitamin C) 100 MG tablet Take 1 tablet (100 mg total) by mouth daily 30 tablet 0    bisacodyl (DULCOLAX) 5 mg EC tablet Take 1 tablet (5 mg total) by mouth daily as needed for constipation 4 tablet 0    Cholecalciferol (Vitamin D3) 125 MCG (5000 UT) capsule Take 1 capsule (5,000 Units total) by mouth daily 30 capsule 0    omega-3-acid ethyl esters (LOVAZA) 1 g capsule Take 2 capsules (2 g total) by mouth 2 (two) times a day 360 capsule 1    polyethylene glycol (MIRALAX) 17 g packet Take 238 g by mouth once for 1 dose 238 g 0    Testosterone 1.62 % GEL Apply 2 actuations topically every morning 75 g 0    traZODone (DESYREL) 100 mg tablet Take 1 and 1/2 tablets by mouth daily at bedtime 135 tablet 1    vitamin B-12 (VITAMIN B-12) 500 mcg tablet Take 1 tablet (500 mcg total) by mouth daily 30 tablet 0     No current facility-administered medications for this visit.     No Known Allergies    Objective   Vitals: There were no vitals taken for this  "visit.    Physical Exam  Constitutional:       General: He is not in acute distress.     Appearance: Normal appearance. He is not ill-appearing, toxic-appearing or diaphoretic.   HENT:      Head: Normocephalic and atraumatic.   Eyes:      Extraocular Movements: Extraocular movements intact.   Cardiovascular:      Rate and Rhythm: Normal rate.   Pulmonary:      Effort: Pulmonary effort is normal. No respiratory distress.   Skin:     General: Skin is warm and dry.   Neurological:      Mental Status: He is alert and oriented to person, place, and time.       The history was obtained from the review of the chart, patient.    Lab Results:   Lab Results   Component Value Date/Time    Potassium 4.1 07/02/2024 12:19 PM    Potassium 4.4 09/22/2023 07:11 AM    Chloride 104 07/02/2024 12:19 PM    Chloride 101 09/22/2023 07:11 AM    CO2 29 07/02/2024 12:19 PM    CO2 30 09/22/2023 07:11 AM    BUN 14 07/02/2024 12:19 PM    BUN 23 09/22/2023 07:11 AM    Creatinine 1.00 07/02/2024 12:19 PM    Creatinine 1.09 09/22/2023 07:11 AM    Glucose, Fasting 95 09/22/2023 07:11 AM    Calcium 9.6 07/02/2024 12:19 PM    Calcium 9.8 09/22/2023 07:11 AM    eGFR 95 07/02/2024 12:19 PM    eGFR 82 09/22/2023 07:11 AM    Testosterone, Free 90.2 07/02/2024 12:19 PM    Testosterone, Free 4.5 (L) 09/22/2023 07:14 AM             Imaging Studies:         I have personally reviewed pertinent reports.      Portions of the record may have been created with voice recognition software. Occasional wrong word or \"sound a like\" substitutions may have occurred due to the inherent limitations of voice recognition software. Read the chart carefully and recognize, using context, where substitutions have occurred.    "

## 2024-09-23 DIAGNOSIS — E29.1 TESTICULAR HYPOGONADISM: ICD-10-CM

## 2024-09-24 RX ORDER — TESTOSTERONE 20.25 MG/1.25G
GEL TOPICAL
Qty: 75 G | Refills: 0 | Status: SHIPPED | OUTPATIENT
Start: 2024-09-24

## 2024-10-29 DIAGNOSIS — E78.2 MIXED HYPERLIPIDEMIA: ICD-10-CM

## 2024-10-29 DIAGNOSIS — F51.01 PRIMARY INSOMNIA: ICD-10-CM

## 2024-10-30 RX ORDER — TRAZODONE HYDROCHLORIDE 100 MG/1
TABLET ORAL
Qty: 135 TABLET | Refills: 1 | Status: SHIPPED | OUTPATIENT
Start: 2024-10-30

## 2024-10-30 RX ORDER — OMEGA-3-ACID ETHYL ESTERS 1 G/1
2 CAPSULE, LIQUID FILLED ORAL 2 TIMES DAILY
Qty: 360 CAPSULE | Refills: 1 | Status: SHIPPED | OUTPATIENT
Start: 2024-10-30

## 2024-11-01 DIAGNOSIS — E29.1 TESTICULAR HYPOGONADISM: ICD-10-CM

## 2024-11-01 RX ORDER — TESTOSTERONE 20.25 MG/1.25G
GEL TOPICAL
Qty: 75 G | Refills: 0 | Status: SHIPPED | OUTPATIENT
Start: 2024-11-01

## 2024-11-11 ENCOUNTER — TELEMEDICINE (OUTPATIENT)
Dept: OTHER | Facility: HOSPITAL | Age: 44
End: 2024-11-11
Payer: COMMERCIAL

## 2024-11-11 DIAGNOSIS — F41.9 ANXIETY: Primary | ICD-10-CM

## 2024-11-11 PROCEDURE — 99213 OFFICE O/P EST LOW 20 MIN: CPT | Performed by: NURSE PRACTITIONER

## 2024-11-11 NOTE — PROGRESS NOTES
Virtual Regular Visit  Name: Oj Ashley      : 1980      MRN: [id number]  Encounter Provider: RL Higgins  Encounter Date: 2024   Encounter department: VIRTUAL CARE     Verification of patient location:    Patient is located at Home in the following state in which I hold an active license [place]    Assessment & Plan         Encounter provider RL Higgins    The patient was identified by name and date of birth. Oj Ashley was informed that this is a telemedicine visit and that the visit is being conducted through the [employer] platform. He agrees to proceed..  My office door was closed. No one else was in the room.  He acknowledged consent and understanding of privacy and security of the video platform. The patient has agreed to participate and understands they can discontinue the visit at any time.    Patient is aware this is a billable service.     History of Present Illness     This a 44 year old male here today for video visit.  He states for the last 2 weeks he been feeling as though he has a hard to yarn and take deep breath.  He states he thinks he is having some anxiety.  He has been having increased stress at home.  He states he feels slightly depressed and states it comes and goes.  He denies any chest pain or sob.  He denies any palpitation.  He states he does feel anxious.  NO weight gain or weight loss, no change in his sleep pattern.  No thoughts of self harm.  He states he feels like he has excessive worry and excessive thoughts.  He states he has had these in the past.  He was on medication in the past for anxiety and depression.   He states he was on anxiety medication for a stutter.  He states he stopped that about 2 years ago.         History obtained from : patient  Review of Systems   Constitutional:  Negative for activity change, chills, fatigue and fever.   Respiratory: Negative.     Cardiovascular: Negative.    Neurological: Negative.     Psychiatric/Behavioral:  Negative for suicidal ideas. The patient is nervous/anxious.            Objective     There were no vitals taken for this visit.  Physical Exam  Constitutional:       General: He is not in acute distress.     Appearance: Normal appearance. He is not ill-appearing or toxic-appearing.   HENT:      Nose: Congestion and rhinorrhea present.   Pulmonary:      Effort: Pulmonary effort is normal. No respiratory distress.   Neurological:      Mental Status: He is alert and oriented to person, place, and time.   Psychiatric:         Mood and Affect: Mood normal.         Behavior: Behavior normal.         Thought Content: Thought content normal.         Judgment: Judgment normal.         Visit Time  Total Visit Duration: 8

## 2024-11-13 NOTE — PROGRESS NOTES
Adult Annual Physical  Name: Oj Ashley      : 1980      MRN: [id number]  Encounter Provider: Bulmaro May PA-C  Encounter Date: 2024   Encounter department: [facility]    Assessment & Plan  Annual physical exam  Routine labs recently resulted        Anxiety with depression  Pt states generalized anxiety for the past year   Financial difficulties, car difficulties, lifestyle and overactive negative thoughts  He feels this is affecting his health, has not had time to workout  Physical Sx of feeling SOB d/t anxiety  Denies SI/HI   Depression Screening Follow-up Plan: Patient's depression screening was positive with a PHQ-2 score of 6. Their PHQ-9 score was 16. Patient with underlying depression and was advised to continue current medications as prescribed. Patient assessed for underlying major depression. They have no active suicidal ideations. Brief counseling provided and recommend additional follow-up/re-evaluation next office visit.  After discussing risks, benefits, & AE of medications and using shared decision making, will start: Zoloft and hydroxyzine PRN, cautioned of Sx of serotonin syndrome d/t current trazodone use  Follow up:1 month(s), call/return to office if symptoms worsen or fail to improve, if new onset increased depression, suicidal/homicidal thoughts and/or actions report to the ED, and pt is in agreement.  Orders:    sertraline (ZOLOFT) 25 mg tablet; Take 1 tablet (25 mg total) by mouth daily    hydrOXYzine HCL (ATARAX) 25 mg tablet; Take 1 tablet (25 mg total) by mouth every 6 (six) hours as needed for anxiety    Immunizations and preventive care screenings were discussed with patient today. Appropriate education was printed on patient's after visit summary.        Counseling:  Dental Health: discussed importance of regular tooth brushing, flossing, and dental visits.  Exercise: the importance of regular exercise/physical activity was discussed.  Recommend exercise 3-5 times per week for at least 30 minutes.       Depression Screening and Follow-up Plan: Patient's depression screening was positive with a PHQ-2 score of 6. Their PHQ-9 score was 16. Patient with underlying depression and was advised to continue current medications as prescribed.         History of Present Illness     Adult Annual Physical:  Patient presents for annual physical. Oj Ashley is a 44 y.o. male  presenting with a complaint of anxiety with depression and is also due for annual. He has a positive PHQ and also notes trouble sleeping    vaccines, screenings, routine labs, reviewed at this visit    .     Diet and Physical Activity:  - Diet/Nutrition: well balanced diet.  - Exercise: no formal exercise, walking and moderate cardiovascular exercise.    Depression Screening:  - PHQ-2 Score: 6  - PHQ-9 Score: 16    General Health:  - Sleep: sleeps poorly. anxiety and depression  - Hearing: normal hearing right ear and normal hearing left ear.  - Vision: no vision problems and wears glasses.  - Dental: regular dental visits.    Review of Systems   Constitutional:  Negative for chills and fever.   HENT:  Negative for ear pain and sore throat.    Eyes:  Negative for pain and visual disturbance.   Respiratory:  Positive for shortness of breath (secondary to anxiety). Negative for cough.    Cardiovascular:  Negative for chest pain and palpitations.   Gastrointestinal:  Negative for abdominal pain and vomiting.   Genitourinary:  Negative for dysuria and hematuria.   Musculoskeletal:  Negative for arthralgias and back pain.   Skin:  Negative for color change and rash.   Neurological:  Negative for seizures and syncope.   Psychiatric/Behavioral:  Positive for dysphoric mood and sleep disturbance. The patient is nervous/anxious.    All other systems reviewed and are negative.    Pertinent Medical History     Medical History Reviewed by provider this encounter:  Tobacco  Allergies  Meds  Problems   Med Hx  Surg Hx  Fam Hx     .  Past Medical History   Past Medical History:   Diagnosis Date    Hypogonadism in male      Past Surgical History:   Procedure Laterality Date    TOOTH EXTRACTION      WISDOM TOOTH EXTRACTION       Family History   Problem Relation Age of Onset    Multiple sclerosis Mother       reports that he has never smoked. He has never used smokeless tobacco. He reports current alcohol use of about 2.0 standard drinks of alcohol per week. He reports that he does not use drugs.  Current Outpatient Medications on File Prior to Visit   Medication Sig Dispense Refill    Ascorbic Acid (vitamin C) 100 MG tablet Take 1 tablet (100 mg total) by mouth daily 30 tablet 0    Cholecalciferol (Vitamin D3) 125 MCG (5000 UT) capsule Take 1 capsule (5,000 Units total) by mouth daily 30 capsule 0    omega-3-acid ethyl esters (LOVAZA) 1 g capsule Take 2 capsules (2 g total) by mouth 2 (two) times a day 360 capsule 1    Testosterone 1.62 % GEL Apply 2 actuations topically every morning 75 g 0    traZODone (DESYREL) 100 mg tablet Take 1 and 1/2 tablets by mouth daily at bedtime 135 tablet 1    vitamin B-12 (VITAMIN B-12) 500 mcg tablet Take 1 tablet (500 mcg total) by mouth daily 30 tablet 0    [DISCONTINUED] bisacodyl (DULCOLAX) 5 mg EC tablet Take 1 tablet (5 mg total) by mouth daily as needed for constipation (Patient not taking: Reported on 9/9/2024) 4 tablet 0    [DISCONTINUED] polyethylene glycol (MIRALAX) 17 g packet Take 238 g by mouth once for 1 dose (Patient not taking: Reported on 9/9/2024) 238 g 0     No current facility-administered medications on file prior to visit.   No Known Allergies   Current Outpatient Medications on File Prior to Visit   Medication Sig Dispense Refill    Ascorbic Acid (vitamin C) 100 MG tablet Take 1 tablet (100 mg total) by mouth daily 30 tablet 0    Cholecalciferol (Vitamin D3) 125 MCG (5000 UT) capsule Take 1 capsule (5,000 Units total) by mouth daily 30 capsule 0     omega-3-acid ethyl esters (LOVAZA) 1 g capsule Take 2 capsules (2 g total) by mouth 2 (two) times a day 360 capsule 1    Testosterone 1.62 % GEL Apply 2 actuations topically every morning 75 g 0    traZODone (DESYREL) 100 mg tablet Take 1 and 1/2 tablets by mouth daily at bedtime 135 tablet 1    vitamin B-12 (VITAMIN B-12) 500 mcg tablet Take 1 tablet (500 mcg total) by mouth daily 30 tablet 0    [DISCONTINUED] bisacodyl (DULCOLAX) 5 mg EC tablet Take 1 tablet (5 mg total) by mouth daily as needed for constipation (Patient not taking: Reported on 9/9/2024) 4 tablet 0    [DISCONTINUED] polyethylene glycol (MIRALAX) 17 g packet Take 238 g by mouth once for 1 dose (Patient not taking: Reported on 9/9/2024) 238 g 0     No current facility-administered medications on file prior to visit.      Social History     Tobacco Use    Smoking status: Never    Smokeless tobacco: Never   Vaping Use    Vaping status: Never Used   Substance and Sexual Activity    Alcohol use: Yes     Alcohol/week: 2.0 standard drinks of alcohol     Types: 2 Cans of beer per week     Comment: social    Drug use: Never    Sexual activity: Yes     Partners: Female     Birth control/protection: Male Sterilization       Objective     There were no vitals taken for this visit.    Physical Exam  Vitals and nursing note reviewed.   Constitutional:       General: He is not in acute distress.     Appearance: He is well-developed.   HENT:      Head: Normocephalic and atraumatic.   Eyes:      Conjunctiva/sclera: Conjunctivae normal.   Cardiovascular:      Rate and Rhythm: Normal rate and regular rhythm.      Heart sounds: No murmur heard.  Pulmonary:      Effort: Pulmonary effort is normal. No respiratory distress.      Breath sounds: Normal breath sounds.   Abdominal:      Palpations: Abdomen is soft.      Tenderness: There is no abdominal tenderness.   Musculoskeletal:         General: No swelling.      Cervical back: Neck supple.   Skin:     General: Skin is  warm and dry.      Capillary Refill: Capillary refill takes less than 2 seconds.   Neurological:      Mental Status: He is alert and oriented to person, place, and time.   Psychiatric:         Mood and Affect: Mood normal.

## 2024-11-14 ENCOUNTER — OFFICE VISIT (OUTPATIENT)
Dept: FAMILY MEDICINE CLINIC | Facility: CLINIC | Age: 44
End: 2024-11-14
Payer: COMMERCIAL

## 2024-11-14 VITALS
HEIGHT: 68 IN | BODY MASS INDEX: 28.34 KG/M2 | OXYGEN SATURATION: 98 % | WEIGHT: 187 LBS | HEART RATE: 48 BPM | SYSTOLIC BLOOD PRESSURE: 110 MMHG | DIASTOLIC BLOOD PRESSURE: 70 MMHG | RESPIRATION RATE: 18 BRPM | TEMPERATURE: 97.8 F

## 2024-11-14 DIAGNOSIS — F41.8 ANXIETY WITH DEPRESSION: ICD-10-CM

## 2024-11-14 DIAGNOSIS — Z00.00 ANNUAL PHYSICAL EXAM: Primary | ICD-10-CM

## 2024-11-14 PROCEDURE — 99396 PREV VISIT EST AGE 40-64: CPT

## 2024-11-14 PROCEDURE — 99213 OFFICE O/P EST LOW 20 MIN: CPT

## 2024-11-14 RX ORDER — SERTRALINE HYDROCHLORIDE 25 MG/1
25 TABLET, FILM COATED ORAL DAILY
Qty: 30 TABLET | Refills: 2 | Status: SHIPPED | OUTPATIENT
Start: 2024-11-14 | End: 2025-05-13

## 2024-11-14 RX ORDER — HYDROXYZINE HYDROCHLORIDE 25 MG/1
25 TABLET, FILM COATED ORAL EVERY 6 HOURS PRN
Qty: 120 TABLET | Refills: 0 | Status: SHIPPED | OUTPATIENT
Start: 2024-11-14 | End: 2024-12-14

## 2024-11-14 NOTE — ASSESSMENT & PLAN NOTE
Pt states generalized anxiety for the past year   Financial difficulties, car difficulties, lifestyle and overactive negative thoughts  He feels this is affecting his health, has not had time to workout  Physical Sx of feeling SOB d/t anxiety  Denies SI/HI   Depression Screening Follow-up Plan: Patient's depression screening was positive with a PHQ-2 score of 6. Their PHQ-9 score was 16. Patient with underlying depression and was advised to continue current medications as prescribed. Patient assessed for underlying major depression. They have no active suicidal ideations. Brief counseling provided and recommend additional follow-up/re-evaluation next office visit.  After discussing risks, benefits, & AE of medications and using shared decision making, will start: Zoloft and hydroxyzine PRN, cautioned of Sx of serotonin syndrome d/t current trazodone use  Follow up:1 month(s), call/return to office if symptoms worsen or fail to improve, if new onset increased depression, suicidal/homicidal thoughts and/or actions report to the ED, and pt is in agreement.  Orders:    sertraline (ZOLOFT) 25 mg tablet; Take 1 tablet (25 mg total) by mouth daily    hydrOXYzine HCL (ATARAX) 25 mg tablet; Take 1 tablet (25 mg total) by mouth every 6 (six) hours as needed for anxiety

## 2024-11-18 ENCOUNTER — TELEMEDICINE (OUTPATIENT)
Dept: OTHER | Facility: HOSPITAL | Age: 44
End: 2024-11-18
Payer: COMMERCIAL

## 2024-11-18 DIAGNOSIS — J01.90 ACUTE SINUSITIS, RECURRENCE NOT SPECIFIED, UNSPECIFIED LOCATION: Primary | ICD-10-CM

## 2024-11-18 PROCEDURE — 99213 OFFICE O/P EST LOW 20 MIN: CPT | Performed by: NURSE PRACTITIONER

## 2024-11-18 RX ORDER — AMOXICILLIN 875 MG/1
875 TABLET, COATED ORAL 2 TIMES DAILY
Qty: 20 TABLET | Refills: 0 | Status: SHIPPED | OUTPATIENT
Start: 2024-11-18 | End: 2024-11-28

## 2024-11-18 NOTE — PATIENT INSTRUCTIONS
"Rest and drink extra fluids.  Start antibiotic.  Take probiotic.  OTC cough and cold as needed.  Flonase can also be helpful.  Nasal saline flushes or mehnaz pot can help flush the sinuses.  Follow up with PCP if no improvement.  Go to ER with any worsening symptoms.     Patient Education     Sinusitis in adults   The Basics   Written by the doctors and editors at [facility]   What is sinusitis? -- Sinusitis is a condition that can cause a stuffy nose, pain in the face, and discharge or \"mucus\" from the nose.  The sinuses are hollow areas in the bones of the face (figure 1). They have a thin lining that normally makes a small amount of mucus. When this lining gets irritated or infected, it swells and makes extra mucus. This causes symptoms.  Sinusitis usually happens after a person gets sick with a cold. The germs causing the cold can infect the sinuses, too. Sometimes, other germs can be the cause of the infection. Often, a person feels like their cold is getting better. But then, they get sinusitis and begin to feel sick again.  What are the symptoms of sinusitis? -- Common symptoms of sinusitis include:   Stuffy or blocked nose   Thick white, yellow, or green discharge from the nose   Pain in the teeth   Pain or pressure in the face - This often feels worse when a person bends forward.  People with sinusitis can also have other symptoms, such as:   Fever   Cough   Trouble smelling   Ear pressure or fullness   Headache   Bad breath   Feeling tired  Most of the time, symptoms start to improve in 7 to 10 days.  Should I see a doctor or nurse? -- See your doctor or nurse if your symptoms last more than 10 days, or if your symptoms first get better but then get worse.  Rarely, sinusitis can lead to serious problems. See your doctor or nurse right away (do not wait 10 days) if you have:   Fever higher than 102°F (38.9°C)   Sudden and severe pain in the face and head   Trouble seeing, or seeing double   Trouble thinking " clearly   Swelling or redness around 1 or both eyes   Stiff neck  Is there anything I can do on my own to feel better? -- Yes. To help with your symptoms, you can:   Take an over-the-counter pain reliever to reduce the pain.   Rinse your nose and sinuses with salt water a few times a day - Ask your doctor or nurse about the best way to do this.   Drink plenty of fluids - Staying hydrated might help to thin the mucus and make it drain more easily.  Your doctor might also recommend a steroid nose spray to reduce the swelling in your nose, especially if you have allergies. Talk to your doctor if you are thinking of using a steroid spray.  How is sinusitis treated? -- Most of the time, sinusitis does not need to be treated with antibiotic medicines. This is because most sinusitis is caused by viruses, not bacteria, and antibiotics do not kill viruses. In fact, even sinusitis caused by bacteria will usually get better on its own without antibiotics.  Some people with sinusitis do need treatment with antibiotics. If your symptoms have not improved after 10 days, ask your doctor if you should take antibiotics. They might recommend that you wait 1 more week to see if your symptoms improve. But if you have symptoms such as a fever or a lot of pain, they might prescribe antibiotics. If you do get antibiotics, follow all of your doctor's instructions about taking them.  What if my symptoms do not get better? -- If your symptoms do not get better, talk with your doctor or nurse. They might order tests to figure out why you still have symptoms. These can include:   CT scan or other imaging tests - Imaging tests create pictures of the inside of the body.   A test to look inside the sinuses - For this test, a doctor puts a thin tube with a camera on the end into the nose and up into the sinuses.  Some people get a lot of sinus infections or have symptoms that last at least 3 months. These people can have a different type of  "sinusitis called \"chronic sinusitis.\" Chronic sinusitis can be caused by different things. For example, some people have growths inside their nose or sinuses that are called \"polyps.\" Other people have allergies that cause their symptoms.  Chronic sinusitis can be treated in different ways. If you have chronic sinusitis, talk with your doctor about which treatments are right for you.  All topics are updated as new evidence becomes available and our peer review process is complete.  This topic retrieved from [employer] on: Feb 28, 2024.  Topic 04696 Version 21.0  Release: 32.2.4 - C32.58  © 2024 UpToDate, Inc. and/or its affiliates. All rights reserved.  figure 1: Sinuses of the face     The sinuses are hollow areas in the bones of the face. This drawing shows where the sinuses are, from the side and front views. There are 4 pairs of sinuses, named for the bones around them: sphenoid, frontal, ethmoid, and maxillary.  Graphic 360785 Version 3.0  Consumer Information Use and Disclaimer   Disclaimer: This generalized information is a limited summary of diagnosis, treatment, and/or medication information. It is not meant to be comprehensive and should be used as a tool to help the user understand and/or assess potential diagnostic and treatment options. It does NOT include all information about conditions, treatments, medications, side effects, or risks that may apply to a specific patient. It is not intended to be medical advice or a substitute for the medical advice, diagnosis, or treatment of a health care provider based on the health care provider's examination and assessment of a patient's specific and unique circumstances. Patients must speak with a health care provider for complete information about their health, medical questions, and treatment options, including any risks or benefits regarding use of medications. This information does not endorse any treatments or medications as safe, effective, or approved for " treating a specific patient. UpToDate, Inc. and its affiliates disclaim any warranty or liability relating to this information or the use thereof.The use of this information is governed by the Terms of Use, available at https://www.wolIsoterauwer.com/en/know/clinical-effectiveness-terms. 2024© UpToDate, Inc. and its affiliates and/or licensors. All rights reserved.  Copyright   © 2024 UpToDate, Inc. and/or its affiliates. All rights reserved.

## 2024-11-18 NOTE — PROGRESS NOTES
Virtual Regular Visit  Name: Oj Ashley      : 1980      MRN: [id number]  Encounter Provider: Your Video Visit Provider  Encounter Date: 2024   Encounter department: VIRTUAL CARE     Verification of patient location: Patient is located at Home in the following state in which I hold an active license [place]    VIRTUAL CARE DOCUMENTATION:     1. This service was provided via Telemedicine using [employer] Emb[employer] platform    2. Parties in the room with patient during televisit: No one else    3. Confidentiality My office door was closed     4. Participants No one else was in the room    5. Patient acknowledged consent and understanding of privacy and security of the  Telemedicine platform. I informed the patient that I have reviewed their record in Epic and presented the opportunity for them to ask any questions regarding the visit today. The patient has agreed to participate and understands they can discontinue the visit at any time.        :  Assessment & Plan  Acute sinusitis, recurrence not specified, unspecified location    Orders:    amoxicillin (AMOXIL) 875 mg tablet; Take 1 tablet (875 mg total) by mouth 2 (two) times a day for 10 days        Encounter provider Your Video Visit Provider    Patient is aware this is a billable service.     History of Present Illness History of Present Illness     This is a 44 year old female here today for video visit.  He states he has had a runny nose for about 2 weeks.  He states he now has developed a sore throat.  He states the gland in his neck are swollen.  He has had headache.  He has been using mucinex.  He states he is having post nasal drip.   He denies any fever.  He states he mucous is clear.  No fever, body aches or chills.  He states it feels like a sinus infection as he has had in the past.        Review of Systems   Constitutional:  Positive for fatigue. Negative for activity change, chills and fever.   HENT:  Positive for congestion, postnasal drip,  rhinorrhea, sinus pressure and sinus pain.    Respiratory:  Positive for cough. Negative for shortness of breath.    Cardiovascular: Negative.    Neurological: Negative.    Psychiatric/Behavioral: Negative.       Objective     There were no vitals taken for this visit.  Physical Exam  Constitutional:       General: He is not in acute distress.     Appearance: Normal appearance. He is not ill-appearing or toxic-appearing.   HENT:      Head: Normocephalic and atraumatic.      Nose: Congestion present.   Eyes:      Conjunctiva/sclera: Conjunctivae normal.   Pulmonary:      Effort: Pulmonary effort is normal. No respiratory distress.   Neurological:      Mental Status: He is alert and oriented to person, place, and time.   Psychiatric:         Mood and Affect: Mood normal.         Behavior: Behavior normal.         Thought Content: Thought content normal.         Judgment: Judgment normal.         Visit Time   I have spent a total time of 5 minutes in caring for this patient on the day of the visit/encounter including Instructions for management, not including the time spent for establishing the audio/video connection.

## 2024-12-05 DIAGNOSIS — E29.1 TESTICULAR HYPOGONADISM: ICD-10-CM

## 2024-12-05 RX ORDER — TESTOSTERONE 20.25 MG/1.25G
GEL TOPICAL
Qty: 75 G | Refills: 0 | Status: SHIPPED | OUTPATIENT
Start: 2024-12-05

## 2024-12-06 DIAGNOSIS — F41.8 ANXIETY WITH DEPRESSION: ICD-10-CM

## 2024-12-06 LAB
BASOPHILS # BLD AUTO: 38 CELLS/UL (ref 0–200)
BASOPHILS NFR BLD AUTO: 0.5 %
CHOLEST SERPL-MCNC: 258 MG/DL
CHOLEST/HDLC SERPL: 4.2 (CALC)
EOSINOPHIL # BLD AUTO: 90 CELLS/UL (ref 15–500)
EOSINOPHIL NFR BLD AUTO: 1.2 %
ERYTHROCYTE [DISTWIDTH] IN BLOOD BY AUTOMATED COUNT: 12.4 % (ref 11–15)
HCT VFR BLD AUTO: 44.4 % (ref 38.5–50)
HDLC SERPL-MCNC: 61 MG/DL
HGB BLD-MCNC: 14.7 G/DL (ref 13.2–17.1)
LDLC SERPL CALC-MCNC: 180 MG/DL (CALC)
LYMPHOCYTES # BLD AUTO: 3203 CELLS/UL (ref 850–3900)
LYMPHOCYTES NFR BLD AUTO: 42.7 %
MCH RBC QN AUTO: 30.1 PG (ref 27–33)
MCHC RBC AUTO-ENTMCNC: 33.1 G/DL (ref 32–36)
MCV RBC AUTO: 90.8 FL (ref 80–100)
MONOCYTES # BLD AUTO: 548 CELLS/UL (ref 200–950)
MONOCYTES NFR BLD AUTO: 7.3 %
NEUTROPHILS # BLD AUTO: 3623 CELLS/UL (ref 1500–7800)
NEUTROPHILS NFR BLD AUTO: 48.3 %
NONHDLC SERPL-MCNC: 197 MG/DL (CALC)
PLATELET # BLD AUTO: 232 THOUSAND/UL (ref 140–400)
PMV BLD REES-ECKER: 10.5 FL (ref 7.5–12.5)
RBC # BLD AUTO: 4.89 MILLION/UL (ref 4.2–5.8)
TESTOST FREE SERPL-MCNC: 121.1 PG/ML (ref 35–155)
TESTOST SERPL-MCNC: 757 NG/DL (ref 250–1100)
TRIGL SERPL-MCNC: 71 MG/DL
WBC # BLD AUTO: 7.5 THOUSAND/UL (ref 3.8–10.8)

## 2024-12-07 DIAGNOSIS — F41.8 ANXIETY WITH DEPRESSION: ICD-10-CM

## 2024-12-08 RX ORDER — SERTRALINE HYDROCHLORIDE 25 MG/1
25 TABLET, FILM COATED ORAL DAILY
Qty: 90 TABLET | Refills: 1 | Status: SHIPPED | OUTPATIENT
Start: 2024-12-08 | End: 2025-06-06

## 2024-12-08 RX ORDER — HYDROXYZINE HYDROCHLORIDE 25 MG/1
25 TABLET, FILM COATED ORAL EVERY 6 HOURS PRN
Qty: 360 TABLET | Refills: 1 | Status: SHIPPED | OUTPATIENT
Start: 2024-12-08

## 2024-12-09 ENCOUNTER — RESULTS FOLLOW-UP (OUTPATIENT)
Dept: ENDOCRINOLOGY | Facility: CLINIC | Age: 44
End: 2024-12-09

## 2024-12-09 NOTE — RESULT ENCOUNTER NOTE
Cholesterol level is elevated.  The increase may be due to the testosterone.  Your testosterone level was slightly above target.  Would recommend alternating 2 pumps with 1 pump every other day.  Repeat total and free testosterone levels in 6 weeks.  We can consider starting you on a statin to improve the cholesterol.

## 2025-01-21 DIAGNOSIS — F41.8 ANXIETY WITH DEPRESSION: ICD-10-CM

## 2025-01-21 DIAGNOSIS — E29.1 TESTICULAR HYPOGONADISM: ICD-10-CM

## 2025-01-21 DIAGNOSIS — F51.01 PRIMARY INSOMNIA: ICD-10-CM

## 2025-01-21 RX ORDER — SERTRALINE HYDROCHLORIDE 25 MG/1
25 TABLET, FILM COATED ORAL DAILY
Qty: 90 TABLET | Refills: 1 | Status: SHIPPED | OUTPATIENT
Start: 2025-01-21 | End: 2025-07-20

## 2025-01-21 RX ORDER — HYDROXYZINE HYDROCHLORIDE 25 MG/1
25 TABLET, FILM COATED ORAL EVERY 6 HOURS PRN
Qty: 360 TABLET | Refills: 1 | Status: SHIPPED | OUTPATIENT
Start: 2025-01-21

## 2025-01-21 RX ORDER — TRAZODONE HYDROCHLORIDE 100 MG/1
TABLET ORAL
Qty: 135 TABLET | Refills: 0 | Status: SHIPPED | OUTPATIENT
Start: 2025-01-21

## 2025-01-21 RX ORDER — TESTOSTERONE 20.25 MG/1.25G
GEL TOPICAL
Qty: 75 G | Refills: 0 | Status: SHIPPED | OUTPATIENT
Start: 2025-01-21

## 2025-01-21 NOTE — TELEPHONE ENCOUNTER
Patient Id Prescription # Filled Written Drug Label Qty Days Strength MME** Prescriber Pharmacy Payment REFILL #/Auth State Detail  1 [id number] 12/01/2024 11/01/2024 Testosterone (Gel/jelly) 75.0 30 1.62% NA [facility] [place] / 0 [place]   1 [id number] 10/22/2024 09/09/2024 Testosterone (Gel/jelly) 75.0 30 1.62% NA [facility] [place]   1 [id number] 09/24/2024 09/24/2024 Testosterone (Gel/jelly) 75.0 30 1.62% NA [facility] [place]   1 [id number] 08/08/2024 08/08/2024 Testosterone (Gel/jelly) 75.0 30 1.62% NA [facility] [place] / 1 [place]   1 [id number] 06/20/2024 06/20/2024 Testosterone (Gel/jelly) 75.0 30 1.62% NA [facility] [place] / 0 [place]   1 [id number] 05/20/2024 04/03/2024 Testosterone (Gel/jelly) 75.0 30 1.62% NA [facility] [place]   1 [id number] 04/03/2024 04/03/2024 Testosterone (Gel/jelly) 75.0 30 1.62% NA [facility] [place] / 1 [place]

## 2025-01-22 ENCOUNTER — TELEPHONE (OUTPATIENT)
Age: 45
End: 2025-01-22

## 2025-01-23 NOTE — TELEPHONE ENCOUNTER
PA for Testosterone 1.62 % GEL SUBMITTED to [employer]    via    [x][employer]-Case ID # [id number]      [x]PA sent as URGENT    All office notes, labs and other pertaining documents and studies sent. Clinical questions answered. Awaiting determination from insurance company.     Turnaround time for your insurance to make a decision on your Prior Authorization can take 7-21 business days.

## 2025-01-23 NOTE — TELEPHONE ENCOUNTER
PA for Testosterone 1.62 % GEL  APPROVED     Date(s) approved 1/23/25-1/23/26    Case #[id number]     Patient advised by          []Zafinhart Message  [x]Phone call   []LMOM  []L/M to call office as no active Communication consent on file  []Unable to leave detailed message as VM not approved on Communication consent       Pharmacy advised by    [x]Fax  []Phone call    Approval letter scanned into Media Yes

## 2025-02-13 ENCOUNTER — TELEMEDICINE (OUTPATIENT)
Dept: OTHER | Facility: HOSPITAL | Age: 45
End: 2025-02-13
Payer: COMMERCIAL

## 2025-02-13 DIAGNOSIS — G89.29 CHRONIC RIGHT SHOULDER PAIN: Primary | ICD-10-CM

## 2025-02-13 DIAGNOSIS — M25.521 CHRONIC ELBOW PAIN, RIGHT: ICD-10-CM

## 2025-02-13 DIAGNOSIS — M25.511 CHRONIC RIGHT SHOULDER PAIN: Primary | ICD-10-CM

## 2025-02-13 DIAGNOSIS — G89.29 CHRONIC ELBOW PAIN, RIGHT: ICD-10-CM

## 2025-02-13 PROCEDURE — 99213 OFFICE O/P EST LOW 20 MIN: CPT | Performed by: PHYSICIAN ASSISTANT

## 2025-02-13 NOTE — PROGRESS NOTES
Virtual Regular Visit  Name: Oj Ashley      : 1980      MRN: [id number]  Encounter Provider: Shannon D Severino, PA-C  Encounter Date: 2025   Encounter department: VIRTUAL CARE       Verification of patient location:  Patient is located at Other in the following state in which I hold an active license [place] :  Assessment & Plan  Chronic right shoulder pain    Orders:    Ambulatory Referral to Orthopedic Surgery; Future    XR shoulder 2+ vw right; Future    Chronic elbow pain, right    Orders:    Ambulatory Referral to Orthopedic Surgery; Future    XR elbow 3+ vw right; Future        Encounter provider Shannon D Severino, PA-C    The patient was identified by name and date of birth. Oj Ashley was informed that this is a telemedicine visit and that the visit is being conducted through the Epic Embedded platform. He agrees to proceed..  My office door was closed. No one else was in the room.  He acknowledged consent and understanding of privacy and security of the video platform. The patient has agreed to participate and understands they can discontinue the visit at any time.    Patient is aware this is a billable service.     History obtained from: patient  History of Present Illness     Pt reports he has been exercising his entire life. He is concerned for R rotator cuff issues and R elbow tendonitis. Sx are impacting his ability to exercise. Hasn't seen anyone for this before. Has tried modifying movements, heat.       Review of Systems   Constitutional:  Negative for fever.   HENT:  Negative for nosebleeds.    Eyes:  Negative for redness.   Respiratory:  Negative for shortness of breath.    Cardiovascular:  Negative for chest pain.   Gastrointestinal:  Negative for blood in stool.   Genitourinary:  Negative for hematuria.   Musculoskeletal:  Positive for arthralgias. Negative for gait problem.   Skin:  Negative for rash.   Neurological:  Negative for seizures.   Psychiatric/Behavioral:  Negative for  behavioral problems.        Objective   There were no vitals taken for this visit.    Physical Exam  Constitutional:       General: He is not in acute distress.     Appearance: Normal appearance. He is not toxic-appearing.   HENT:      Head: Normocephalic and atraumatic.      Nose: No rhinorrhea.      Mouth/Throat:      Mouth: Mucous membranes are moist.   Eyes:      Conjunctiva/sclera: Conjunctivae normal.   Pulmonary:      Effort: Pulmonary effort is normal. No respiratory distress.      Breath sounds: No wheezing (no gross audible wheeze through computer).   Musculoskeletal:      Cervical back: Normal range of motion.      Comments: Moderately limited external rotation and mildly limited adduction of R shoulder, but otherwise intact ROM. Notes R shoulder pain when pushing off chair or floor, or chest presses. ROM at elbow intact. Notes shocking pain to R elbow which radiates to dorsal forearm with doing curls.    Skin:     Findings: No rash (on face or neck).   Neurological:      Mental Status: He is alert.      Cranial Nerves: No dysarthria or facial asymmetry.   Psychiatric:         Mood and Affect: Mood normal.         Behavior: Behavior normal.         Visit Time  Total Visit Duration: 16 minutes not including the time spent for establishing the audio/video connection.

## 2025-02-13 NOTE — PATIENT INSTRUCTIONS
"Rest your injury as much as possible.  Ice the injury 20 minutes on 20 minutes off as needed for pain.  Compress the area using an ace wrap or compression bandage.  Elevate the injury above the level of the heart as much as possible to help with swelling.  Take aleve every 12 hours as needed.  You can also take Tylenol in between there for continued pain relief.  Please follow-up with an orthopedic group within 1-2 weeks if you continue to have pain. Call [phone] to schedule. Go to the emergency department sooner if you have any new, worsening, or concerning symptoms.    You can go to any outpatient [facility] Radiology site during walk-in hours to get an X-ray  Just provide your name and date of birth at registration and they will be able to pull up the orders.  You can search for a site using [employer] \"Find Care\" and change location type to \"Imaging\" or click the link below:  [email or web address]    It may take a few days for the radiologist to read the films.  The results will go directly to your [employer] zafar under \"Test Results\"  You should be able to screenshot the results, or you can print if opened from a web browser  "

## 2025-02-20 ENCOUNTER — APPOINTMENT (OUTPATIENT)
Dept: RADIOLOGY | Facility: CLINIC | Age: 45
End: 2025-02-20
Payer: COMMERCIAL

## 2025-02-20 ENCOUNTER — OFFICE VISIT (OUTPATIENT)
Dept: OBGYN CLINIC | Facility: CLINIC | Age: 45
End: 2025-02-20
Payer: COMMERCIAL

## 2025-02-20 ENCOUNTER — RESULTS FOLLOW-UP (OUTPATIENT)
Dept: OTHER | Facility: HOSPITAL | Age: 45
End: 2025-02-20

## 2025-02-20 VITALS — BODY MASS INDEX: 27.43 KG/M2 | HEIGHT: 68 IN | WEIGHT: 181 LBS

## 2025-02-20 DIAGNOSIS — M24.811 INTERNAL DERANGEMENT OF RIGHT SHOULDER: Primary | ICD-10-CM

## 2025-02-20 DIAGNOSIS — M25.521 CHRONIC ELBOW PAIN, RIGHT: ICD-10-CM

## 2025-02-20 DIAGNOSIS — G89.29 CHRONIC ELBOW PAIN, RIGHT: ICD-10-CM

## 2025-02-20 DIAGNOSIS — M25.511 CHRONIC RIGHT SHOULDER PAIN: ICD-10-CM

## 2025-02-20 DIAGNOSIS — M25.511 RIGHT SHOULDER PAIN, UNSPECIFIED CHRONICITY: ICD-10-CM

## 2025-02-20 DIAGNOSIS — G89.29 CHRONIC RIGHT SHOULDER PAIN: ICD-10-CM

## 2025-02-20 PROCEDURE — 99204 OFFICE O/P NEW MOD 45 MIN: CPT | Performed by: STUDENT IN AN ORGANIZED HEALTH CARE EDUCATION/TRAINING PROGRAM

## 2025-02-20 PROCEDURE — 73030 X-RAY EXAM OF SHOULDER: CPT

## 2025-02-20 PROCEDURE — 73080 X-RAY EXAM OF ELBOW: CPT

## 2025-02-20 NOTE — PROGRESS NOTES
.Ortho Sports Medicine Shoulder New Patient Visit     Assesment:   44 y.o. male with right shoulder pain for 1.5 years that started after bench pressing with magnesium, symptoms, exam concerning for pathology    Plan:  Reviewed history, physical exam, and imaging with the patient at time of visit.  I did review the patient's radiographs show no evidence of fracture or degenerative disease.  On exam, patient does have good shoulder range of motion and strength.  Does have pain with biceps maneuvers as well as [place]'s.  Discussed with patient that symptoms could be due to labral pathology.  I discussed potential treatment options with the patient focusing on conservative management at this point.  He is only taking anti-inflammatories and activity modification to deal with the symptoms.  They have persisted for the past 1.5 years and do interfere with his ability to lift weights.  Patient does state that lifting is important for his mental health when he would like to be able to continue without dealing with pain in the shoulder.  I discussed potential treatment options with the patient including physical therapy, continue meds, injections, and advanced imaging.  Given that the symptoms have persisted for the past 1.5 years despite medications and activity modification, patient was interested in getting an MRI at this point.  I did recommend getting an MR arthrogram to evaluate for labral pathology.  I did place the order.  I discussed that I would recommend avoiding activities that cause pain until after the MR arthrogram is complete.  I will see the patient back in clinic after the MRI is complete to discuss results and further treatment options. The patient demonstrated understanding of the discussion and was in agreement with the plan.  All of the questions were answered.  Patient can reach out to clinic with any questions or concerns at any time.       Follow up:  Return for MR arthrogram review.      Chief  Complaint   Patient presents with    Right Shoulder - Pain         History of Present Illness:  The patient is a 44 y.o. male seen in clinic for right shoulder pain.  Patient states that this him started approxione 0.5 years ago.  He states that started when he was doing flat bench press started feel pain in the right shoulder.  He denies a specific pop or tearing sensation.  He localizes the pain over the anterior aspect of the shoulder.  He states that it is intermittent but is exacerbated by bench press in particular.  He denies any feelings of weakness, stiffness, or instability.  He does state that in addition to flat bench aggravating symptoms they are also aggravated by pushing up from a chair from a seated position and reaching to his side.  He does take Aleve which does help with some of his symptoms.  He does feel that overall symptoms have been getting worse over the past year and a half.  He denies any prior injuries or surgeries on the shoulder.  The patient does enjoy working out but does state that the shoulder pain forces him to stop for now intermittently to allow for the pain to resolve.      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  Sports/Activities: weight lifting    The patient has the following co-morbidities: none      Shoulder Surgical History:  None    Past Medical, Social and Family History:  Past Medical History:   Diagnosis Date    Hypogonadism in male      Past Surgical History:   Procedure Laterality Date    TOOTH EXTRACTION      WISDOM TOOTH EXTRACTION       No Known Allergies  Current Outpatient Medications on File Prior to Visit   Medication Sig Dispense Refill    Ascorbic Acid (vitamin C) 100 MG tablet Take 1 tablet (100 mg total) by mouth daily 30 tablet 0    Cholecalciferol (Vitamin D3) 125 MCG (5000 UT) capsule Take 1 capsule (5,000 Units total) by mouth daily 30 capsule 0    omega-3-acid ethyl esters (LOVAZA) 1 g capsule Take 2 capsules (2 g total) by mouth 2  (two) times a day 360 capsule 1    Testosterone 1.62 % GEL Apply 2 actuations topically every morning 75 g 0    traZODone (DESYREL) 100 mg tablet Take 1 and 1/2 tablets by mouth daily at bedtime 135 tablet 0    vitamin B-12 (VITAMIN B-12) 500 mcg tablet Take 1 tablet (500 mcg total) by mouth daily 30 tablet 0    hydrOXYzine HCL (ATARAX) 25 mg tablet Take 1 tablet (25 mg total) by mouth every 6 (six) hours as needed for anxiety 360 tablet 1    sertraline (ZOLOFT) 25 mg tablet Take 1 tablet (25 mg total) by mouth daily 90 tablet 1     No current facility-administered medications on file prior to visit.     Social History     Socioeconomic History    Marital status: /Civil Union     Spouse name: Not on file    Number of children: Not on file    Years of education: Not on file    Highest education level: Not on file   Occupational History    Occupation: laboror   Tobacco Use    Smoking status: Never    Smokeless tobacco: Never   Vaping Use    Vaping status: Never Used   Substance and Sexual Activity    Alcohol use: Yes     Alcohol/week: 2.0 standard drinks of alcohol     Types: 2 Cans of beer per week     Comment: social    Drug use: Never    Sexual activity: Yes     Partners: Female     Birth control/protection: Male Sterilization   Other Topics Concern    Not on file   Social History Narrative        Regular dental care    Good dental hygiene    Caffeine use     Social Drivers of Health     Financial Resource Strain: Not on file   Food Insecurity: Not on file   Transportation Needs: Not on file   Physical Activity: Not on file   Stress: Not on file   Social Connections: Unknown (6/18/2024)    Received from [employer]    Social Connections     How often do you feel lonely or isolated from those around you? (Adult - for ages 18 years and over): Not on file   Intimate Partner Violence: Not on file   Housing Stability: Not on file       I have reviewed the past medical, surgical, social and family history,  "medications and allergies as documented in the EMR.    Review of systems: ROS is negative other than that noted in the HPI.  Constitutional: Negative for fatigue and fever.      Physical Exam:    Height 5' 8\" (1.727 m), weight 82.1 kg (181 lb).    General/Constitutional: NAD, well developed, well nourished  HENT: Normocephalic, atraumatic  CV: Intact distal pulses, regular rate  Resp: No respiratory distress or labored breathing  Neuro: Alert and Oriented x 3  Psych: Normal mood, normal affect, normal judgement, normal behavior  Skin: Warm, dry, no rashes, no erythema      Focused right shoulder exam:  No paracervical tenderness.   No cervical tenderness.   No pain with neck flexion, extension, side-to-side bending, or rotation.   Negative [place]ur[place]'s bilaterally.    The skin is intact without evidence of erythema or ecchymosis. Symmetric shoulders with no evidence of supraspinatus or infraspinatus muscle atrophy. There is no evidence neurologic medial or lateral scapular winging. Normal scapular positioning.    Palpation demonstrates no tenderness over the SC joint, bilateral clavicle, lateral aspect of the acromion or posterior joint line, AC joint.  Tenderness over the bicipital groove.    Shoulder ROM demonstrates 170 degrees of active and 170 degrees of passive forward elevation. External rotation with the arms at the side demonstrates 80 degree of active and 80 degrees of passive motion.  Internal rotation is to T12.        Strength testing demonstrates 5/5 strength in empty can position, 5/5 with resisted external rotation with the arm at the side.  5/5 strength of the subscapularis and a negative belly press.  Hornblower sign is negative, external rotation lag sign is negative    Provocative testing for the following demonstrate-  Impingement- negative Hawkin's impingement test, negative Neer impingement sign.  Biceps- positive [place], positive Speeds test.  Stability- negative apprehension sign and a " negative relocation test, negative anterior load and shift, negative posterior load and shift testing, negative Jess test and a negative Jerk test.  Labrum- positive [place] test, negative sulcus sign      UE NV Exam: +2 Radial pulses bilaterally. Fingers are warm and well-perfused.  Sensation intact to light touch C5-T1 bilaterally, Radial/median/ulnar nerve motor intact      Shoulder Imaging    Radiographs of the right shoulder and elbow were obtained on 2/20/2025 and reviewed with the patient.  Based on my independent evaluation, the imaging shows no evidence of fracture, dislocation, or significant generative disease.        Scribe Attestation      I,:   am acting as a scribe while in the presence of the attending physician.:       I,:   personally performed the services described in this documentation    as scribed in my presence.:

## 2025-03-06 DIAGNOSIS — E29.1 TESTICULAR HYPOGONADISM: ICD-10-CM

## 2025-03-06 RX ORDER — TESTOSTERONE 20.25 MG/1.25G
GEL TOPICAL
Qty: 75 G | Refills: 0 | Status: SHIPPED | OUTPATIENT
Start: 2025-03-06 | End: 2025-03-12

## 2025-03-07 ENCOUNTER — RESULTS FOLLOW-UP (OUTPATIENT)
Dept: ENDOCRINOLOGY | Facility: CLINIC | Age: 45
End: 2025-03-07

## 2025-03-07 LAB
ALBUMIN SERPL-MCNC: 4.5 G/DL (ref 3.6–5.1)
ERYTHROCYTE [DISTWIDTH] IN BLOOD BY AUTOMATED COUNT: 12 % (ref 11–15)
HCT VFR BLD AUTO: 46.6 % (ref 38.5–50)
HGB BLD-MCNC: 15.2 G/DL (ref 13.2–17.1)
MCH RBC QN AUTO: 29.8 PG (ref 27–33)
MCHC RBC AUTO-ENTMCNC: 32.6 G/DL (ref 32–36)
MCV RBC AUTO: 91.4 FL (ref 80–100)
PLATELET # BLD AUTO: 244 THOUSAND/UL (ref 140–400)
PMV BLD REES-ECKER: 10.7 FL (ref 7.5–12.5)
RBC # BLD AUTO: 5.1 MILLION/UL (ref 4.2–5.8)
SHBG SERPL-SCNC: 68 NMOL/L (ref 10–50)
TESTOST FREE SERPL-MCNC: 39 PG/ML (ref 46–224)
TESTOST SERPL-MCNC: 547 NG/DL (ref 250–827)
TESTOSTERONE.FREE+WB SERPL-MCNC: 80.1 NG/DL (ref 110–575)
WBC # BLD AUTO: 7 THOUSAND/UL (ref 3.8–10.8)

## 2025-03-12 ENCOUNTER — OFFICE VISIT (OUTPATIENT)
Dept: ENDOCRINOLOGY | Facility: CLINIC | Age: 45
End: 2025-03-12
Payer: COMMERCIAL

## 2025-03-12 VITALS
RESPIRATION RATE: 18 BRPM | OXYGEN SATURATION: 99 % | SYSTOLIC BLOOD PRESSURE: 124 MMHG | HEART RATE: 54 BPM | DIASTOLIC BLOOD PRESSURE: 66 MMHG | WEIGHT: 186.6 LBS | TEMPERATURE: 97.6 F | HEIGHT: 68 IN | BODY MASS INDEX: 28.28 KG/M2

## 2025-03-12 DIAGNOSIS — Z79.890 LONG-TERM CURRENT USE OF TESTOSTERONE REPLACEMENT THERAPY: ICD-10-CM

## 2025-03-12 DIAGNOSIS — E29.1 TESTICULAR HYPOGONADISM: Primary | ICD-10-CM

## 2025-03-12 DIAGNOSIS — E55.9 VITAMIN D DEFICIENCY: ICD-10-CM

## 2025-03-12 DIAGNOSIS — R53.83 OTHER FATIGUE: ICD-10-CM

## 2025-03-12 PROCEDURE — 99214 OFFICE O/P EST MOD 30 MIN: CPT | Performed by: STUDENT IN AN ORGANIZED HEALTH CARE EDUCATION/TRAINING PROGRAM

## 2025-03-12 RX ORDER — TESTOSTERONE 20.25 MG/1.25G
GEL TOPICAL
Qty: 75 G | Refills: 1 | Status: SHIPPED | OUTPATIENT
Start: 2025-03-12

## 2025-03-12 NOTE — ASSESSMENT & PLAN NOTE
His total testosterone levels are within the normal range, between 500 and 700, and his CBC results are also satisfactory. The free testosterone level appears inaccurately low. His hematocrit is currently at 46 percent, which is acceptable. His vitamin D levels were last checked in 2022 and were within normal limits. His thyroid function was also assessed previously. He will maintain his current dosage of testosterone gel, 1.62%, applying 2 pumps every other day and 1 pump on alternate days. A refill for his testosterone gel has been provided. He will continue to monitor his CBC to ensure it does not cause polycythemia, which would necessitate a reduction in his testosterone dosage. He will have his free and total testosterone levels rechecked in 6 months, along with his CBC, TSH, and vitamin D levels.    Follow-up  The patient will follow up in 6 months.    Orders:    Testosterone, Free+Total LC/MS; Future    Testosterone 1.62 % GEL; Apply 2 actuations topically every morning

## 2025-03-12 NOTE — PROGRESS NOTES
Name: Oj Ashley      : 1980      MRN: [id number]  Encounter Provider: Anselmo Tom MD  Encounter Date: 3/12/2025   Encounter department: [facility] [place]    Chief Complaint   Patient presents with    Follow-up   :  Assessment & Plan  Long-term current use of testosterone replacement therapy    Orders:    CBC and Platelet; Future    Testicular hypogonadism    His total testosterone levels are within the normal range, between 500 and 700, and his CBC results are also satisfactory. The free testosterone level appears inaccurately low. His hematocrit is currently at 46 percent, which is acceptable. His vitamin D levels were last checked in  and were within normal limits. His thyroid function was also assessed previously. He will maintain his current dosage of testosterone gel, 1.62%, applying 2 pumps every other day and 1 pump on alternate days. A refill for his testosterone gel has been provided. He will continue to monitor his CBC to ensure it does not cause polycythemia, which would necessitate a reduction in his testosterone dosage. He will have his free and total testosterone levels rechecked in 6 months, along with his CBC, TSH, and vitamin D levels.    Follow-up  The patient will follow up in 6 months.    Orders:    Testosterone, Free+Total LC/MS; Future    Testosterone 1.62 % GEL; Apply 2 actuations topically every morning    Vitamin D deficiency    Orders:    Vitamin D 25 hydroxy; Future    Other fatigue    Orders:    TSH, 3rd generation; Future        History of Present Illness   History of Present Illness  Oj Ashley is a 44-year-old male who presents for testicular hypogonadism and long term testosterone replacement therapy.      He has been on a long-term regimen of testosterone therapy, spanning approximately 10 years. His current dosage involves the application of 2 pumps of 1.62% testosterone gel every other day, alternating with 1 pump on the  "subsequent days. This adjustment was made following an increase in his testosterone levels, as advised by Dr. De Guzman.   He reports experiencing frequent fatigue and mood swings, which he believes are well-managed at present.     He has uploaded his test results to  and the analysis was different than what he was told here. He was told that he could be experiencing frequent lack of energy and mood swings.     He has recently relocated, which has increased the distance between him and [facility].          Pertinent Medical History           Review of Systems as per [facility]       Medical History Reviewed by provider this encounter:     .    Objective   /66 (BP Location: Right arm, Patient Position: Sitting, Cuff Size: Adult)   Pulse (!) 54   Temp 97.6 °F (36.4 °C) (Temporal)   Resp 18   Ht 5' 8\" (1.727 m)   Wt 84.6 kg (186 lb 9.6 oz)   SpO2 99%   BMI 28.37 kg/m²      Body mass index is 28.37 kg/m².  Wt Readings from Last 3 Encounters:   03/12/25 84.6 kg (186 lb 9.6 oz)   02/20/25 82.1 kg (181 lb)   11/14/24 84.8 kg (187 lb)     Physical Exam  Constitutional:       General: He is not in acute distress.     Appearance: He is not ill-appearing.   Pulmonary:      Effort: Pulmonary effort is normal. No respiratory distress.   Neurological:      Mental Status: He is oriented to person, place, and time.       Physical Exam      Results  Laboratory Studies  Total testosterone level is between 500 and 700. Free testosterone level is 39. Hematocrit is below 48. Vitamin D level was normal in 2022.  Labs:   Lab Results   Component Value Date    HGBA1C 5.4 08/20/2021    HGBA1C 5.4 11/30/2020    HGBA1C 5.4 08/05/2019     Lab Results   Component Value Date    CREATININE 1.00 07/02/2024    CREATININE 1.09 09/22/2023    CREATININE 1.08 02/20/2023    BUN 14 07/02/2024    K 4.1 07/02/2024     07/02/2024    CO2 29 07/02/2024     eGFRcr   Date Value Ref Range Status   02/20/2023 87 >59 Final     eGFR   Date Value " "Ref Range Status   07/02/2024 95 > OR = 60 mL/min/1.73m2 Final   09/22/2023 82 ml/min/1.73sq m Final     Lab Results   Component Value Date    HDL 61 12/03/2024    TRIG 71 12/03/2024     Lab Results   Component Value Date    ALT 16 07/02/2024    AST 24 07/02/2024    ALKPHOS 42 07/02/2024     Lab Results   Component Value Date    AKA1CZQXMMQL 2.730 08/20/2021    NDM6RSJYHTVT 1.890 11/30/2020    FPQ9YXZIBWVI 2.284 08/15/2018     Component      Latest Ref Rng 3/6/2025   Testosterone, Total, LC/MS      250 - 827 ng/dL 547    Albumin      3.6 - 5.1 g/dL 4.5    SEX HORMONE BINDING GLOBULIN      10 - 50 nmol/L 68 (H)    TESTOSTERONE FREE      46.0 - 224.0 pg/mL 39.0 (L)    Testosterone, Bioavailable      110.0 - 575.0 ng/dL 80.1 (L)       Legend:  (H) High  (L) Low  No results found for: \"FREET4\", \"TSI\"    There are no Patient Instructions on file for this visit.    Discussed with the patient and all questioned fully answered. He will call me if any problems arise.      "

## 2025-03-19 NOTE — PRE-PROCEDURE INSTRUCTIONS
Call placed to patient to discuss upcoming Right shoulder arthrogram at [facility]. Allergies reviewed and verified patient does not currently take any anticoagulant medications. Pre-procedure instructions discussed with patient. Patient instructed that he may eat normally and take medications as usual before the procedure. Procedure and post procedure expectations and instructions reviewed with the patient. Reminder given to patient of appointment date and time of 3/31/25 at 12:45pm. Patient verbalizes understanding and denies any questions at this time.

## 2025-03-31 ENCOUNTER — HOSPITAL ENCOUNTER (OUTPATIENT)
Dept: MRI IMAGING | Facility: HOSPITAL | Age: 45
Discharge: HOME | End: 2025-03-31
Payer: COMMERCIAL

## 2025-03-31 ENCOUNTER — HOSPITAL ENCOUNTER (OUTPATIENT)
Dept: RADIOLOGY | Facility: HOSPITAL | Age: 45
Discharge: HOME | End: 2025-03-31
Payer: COMMERCIAL

## 2025-03-31 DIAGNOSIS — M24.811 INTERNAL DERANGEMENT OF RIGHT SHOULDER: ICD-10-CM

## 2025-03-31 PROCEDURE — 73222 MRI JOINT UPR EXTREM W/DYE: CPT

## 2025-03-31 PROCEDURE — A9585 GADOBUTROL INJECTION: HCPCS

## 2025-03-31 PROCEDURE — 23350 INJECTION FOR SHOULDER X-RAY: CPT

## 2025-03-31 PROCEDURE — 77002 NEEDLE LOCALIZATION BY XRAY: CPT

## 2025-03-31 RX ORDER — ROPIVACAINE HYDROCHLORIDE 2 MG/ML
20 INJECTION, SOLUTION EPIDURAL; INFILTRATION; PERINEURAL ONCE
Status: COMPLETED | OUTPATIENT
Start: 2025-03-31 | End: 2025-03-31

## 2025-03-31 RX ORDER — LIDOCAINE HYDROCHLORIDE 10 MG/ML
4 INJECTION, SOLUTION EPIDURAL; INFILTRATION; INTRACAUDAL; PERINEURAL
Status: COMPLETED | OUTPATIENT
Start: 2025-03-31 | End: 2025-03-31

## 2025-03-31 RX ORDER — GADOBUTROL 604.72 MG/ML
2 INJECTION INTRAVENOUS
Status: COMPLETED | OUTPATIENT
Start: 2025-03-31 | End: 2025-03-31

## 2025-03-31 RX ADMIN — LIDOCAINE HYDROCHLORIDE 4 ML: 10 INJECTION, SOLUTION EPIDURAL; INFILTRATION; INTRACAUDAL; PERINEURAL at 13:10

## 2025-03-31 RX ADMIN — GADOBUTROL 0.2 ML: 604.72 INJECTION INTRAVENOUS at 13:13

## 2025-03-31 RX ADMIN — ROPIVACAINE HYDROCHLORIDE 2 ML: 2 INJECTION, SOLUTION EPIDURAL; INFILTRATION at 13:13

## 2025-03-31 RX ADMIN — IOHEXOL 1 ML: 300 INJECTION, SOLUTION INTRAVENOUS at 13:11

## 2025-04-04 VITALS — BODY MASS INDEX: 29.1 KG/M2 | HEIGHT: 68 IN | WEIGHT: 192 LBS

## 2025-04-04 DIAGNOSIS — M75.101 TEAR OF RIGHT SUPRASPINATUS TENDON: Primary | ICD-10-CM

## 2025-04-04 PROCEDURE — 99214 OFFICE O/P EST MOD 30 MIN: CPT | Performed by: STUDENT IN AN ORGANIZED HEALTH CARE EDUCATION/TRAINING PROGRAM

## 2025-04-04 RX ORDER — MELOXICAM 15 MG/1
15 TABLET ORAL DAILY
Qty: 30 TABLET | Refills: 0 | Status: SHIPPED | OUTPATIENT
Start: 2025-04-04

## 2025-04-04 NOTE — ASSESSMENT & PLAN NOTE
Assesment:   44 y.o. male with right shoulder partial thickness (50%) articular sided supraspinatus tear in the critical zone following bench press approximately 1.5 years ago.    Plan: Reviewed history, physical exam, and imaging with the patient at time of visit.  MRI arthrogram from 03/31/2025 was reviewed with the patient which demonstrates a right shoulder partial thickness 50% articular sided supraspinatus tear in the critical zone. We discussed surgical vs non-surgical treatment options. Patient states he is hoping to avoid surgery. Given patient has not initiated non-surgical treatment options, physical therapy was recommended for shoulder, rotator cuff, and scapular stabilizers strengthening and ROM. A CSI of the right shoulder was offered but declined during today's visit.  Prescription meloxicam was sent to the patient's pharmacy.  Explained surgical intervention would include a right shoulder arthroscopy with rotator cuff repair.  Explained give any he has a partial-thickness articular sided tear this procedure would include completing the tear and tacking down.  Explained recovery would take approximately 6 months for return to weightlifting.  He was instructed to follow-up in approximately 6 weeks for repeat evaluation after conservative treatment measures.  The patient demonstrated understanding of the discussion and was in agreement with the plan.  All of the questions were answered.  Patient can reach out to clinic with any questions or concerns at any time.

## 2025-04-04 NOTE — PROGRESS NOTES
.Ortho Sports Medicine Shoulder Follow-Up Patient Visit     1. Tear of right supraspinatus tendon  Assessment & Plan:  Assesment:   44 y.o. male with right shoulder partial thickness (50%) articular sided supraspinatus tear in the critical zone following bench press approximately 1.5 years ago.    Plan: Reviewed history, physical exam, and imaging with the patient at time of visit.  MRI arthrogram from 03/31/2025 was reviewed with the patient which demonstrates a right shoulder partial thickness 50% articular sided supraspinatus tear in the critical zone. We discussed surgical vs non-surgical treatment options. Patient states he is hoping to avoid surgery. Given patient has not initiated non-surgical treatment options, physical therapy was recommended for shoulder, rotator cuff, and scapular stabilizers strengthening and ROM. A CSI of the right shoulder was offered but declined during today's visit.  Prescription meloxicam was sent to the patient's pharmacy.  Explained surgical intervention would include a right shoulder arthroscopy with rotator cuff repair.  Explained give any he has a partial-thickness articular sided tear this procedure would include completing the tear and tacking down.  Explained recovery would take approximately 6 months for return to weightlifting.  He was instructed to follow-up in approximately 6 weeks for repeat evaluation after conservative treatment measures.  The patient demonstrated understanding of the discussion and was in agreement with the plan.  All of the questions were answered.  Patient can reach out to clinic with any questions or concerns at any time.   Orders:  -     Ambulatory Referral to Physical Therapy; Future  -     meloxicam (Mobic) 15 mg tablet; Take 1 tablet (15 mg total) by mouth daily      Follow up:  Return in about 6 weeks (around 5/16/2025) for Recheck.      Chief Complaint   Patient presents with    Right Shoulder - Follow-up         History of Present  Illness:  The patient is a 44 y.o. right-hand dominant male seen in clinic for for follow-up and MRI patient reports injury approximately 1.5 years ago while bench pressing.  No specific tearing sensation or pop.  Patient reports since his injury he has had anterior shoulder pain that comes and goes made worse with physical sports today flat bench press using a barbell and not dumbbells, and pushing himself up off a chair. Today, patient reports 0/10 shoulder pain at rest. With mild anterior shoulder pain. Pain does radiate to the elbow. Denies distal numbness or tingling. He has tried Aleve for pain. He has not initiated formal physical therapy nor CSI. He is interested in non-surgical treatment options.    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    The patient has the following co-morbidities: none      Shoulder Surgical History:  None    Past Medical, Social and Family History:  Past Medical History:   Diagnosis Date    Hypogonadism in male      Past Surgical History:   Procedure Laterality Date    FL INJECTION RIGHT SHOULDER (ARTHROGRAM)  3/31/2025    TOOTH EXTRACTION      WISDOM TOOTH EXTRACTION       No Known Allergies  Current Outpatient Medications on File Prior to Visit   Medication Sig Dispense Refill    Ascorbic Acid (vitamin C) 100 MG tablet Take 1 tablet (100 mg total) by mouth daily 30 tablet 0    Cholecalciferol (Vitamin D3) 125 MCG (5000 UT) capsule Take 1 capsule (5,000 Units total) by mouth daily 30 capsule 0    omega-3-acid ethyl esters (LOVAZA) 1 g capsule Take 2 capsules (2 g total) by mouth 2 (two) times a day 360 capsule 1    Testosterone 1.62 % GEL Apply 2 actuations topically every morning 75 g 1    traZODone (DESYREL) 100 mg tablet Take 1 and 1/2 tablets by mouth daily at bedtime 135 tablet 0    vitamin B-12 (VITAMIN B-12) 500 mcg tablet Take 1 tablet  "(500 mcg total) by mouth daily 30 tablet 0    hydrOXYzine HCL (ATARAX) 25 mg tablet Take 1 tablet (25 mg total) by mouth every 6 (six) hours as needed for anxiety 360 tablet 1    sertraline (ZOLOFT) 25 mg tablet Take 1 tablet (25 mg total) by mouth daily 90 tablet 1     No current facility-administered medications on file prior to visit.     Social History     Socioeconomic History    Marital status: /Civil Union     Spouse name: Not on file    Number of children: Not on file    Years of education: Not on file    Highest education level: Not on file   Occupational History    Occupation: laboror   Tobacco Use    Smoking status: Never    Smokeless tobacco: Never   Vaping Use    Vaping status: Never Used   Substance and Sexual Activity    Alcohol use: Yes     Alcohol/week: 2.0 standard drinks of alcohol     Types: 2 Cans of beer per week     Comment: social    Drug use: Never    Sexual activity: Yes     Partners: Female     Birth control/protection: Male Sterilization   Other Topics Concern    Not on file   Social History Narrative        Regular dental care    Good dental hygiene    Caffeine use     Social Drivers of Health     Financial Resource Strain: Not on file   Food Insecurity: Not on file   Transportation Needs: Not on file   Physical Activity: Not on file   Stress: Not on file   Social Connections: Unknown (6/18/2024)    Received from [facility]     How often do you feel lonely or isolated from those around you? (Adult - for ages 18 years and over): Not on file   Intimate Partner Violence: Not on file   Housing Stability: Not on file       I have reviewed the past medical, surgical, social and family history, medications and allergies as documented in the EMR.    Review of systems: ROS is negative other than that noted in the HPI.  Constitutional: Negative for fatigue and fever.      Physical Exam:    Height 5' 8\" (1.727 m), weight 87.1 kg (192 " lb).    General/Constitutional: NAD, well developed, well nourished  HENT: Normocephalic, atraumatic  CV: Intact distal pulses, regular rate  Resp: No respiratory distress or labored breathing  Neuro: Alert and Oriented x 3  Psych: Normal mood, normal affect, normal judgement, normal behavior  Skin: Warm, dry, no rashes, no erythema      Focused right shoulder exam:  No paracervical tenderness.   No cervical tenderness.   No pain with neck flexion, extension, side-to-side bending, or rotation.   Negative Spurling's bilaterally.    The skin is intact without evidence of erythema or ecchymosis. Symmetric shoulders with no evidence of supraspinatus or infraspinatus muscle atrophy. There is no evidence neurologic medial or lateral scapular winging. Normal scapular positioning.    Palpation demonstrates no tenderness over the SC joint, bilateral clavicle, lateral aspect of the acromion or posterior joint line, AC joint.  No tenderness over the bicipital groove today. Tenderness at coracoid.    Shoulder ROM demonstrates 170 degrees of active and 170 degrees of passive forward elevation. External rotation with the arms at the side demonstrates 80 degree of active and 80 degrees of passive motion.  Internal rotation is to T12.        Strength testing demonstrates 5/5 strength in empty can position, 5/5 with resisted external rotation with the arm at the side.  5/5 strength of the subscapularis and a negative belly press.  Hornblower sign is negative, external rotation lag sign is negative    Provocative testing for the following demonstrate-  Impingement- negative Hawkin's impingement test, negative Neer impingement sign.  Biceps- negative Yeagerson, negative Speeds test.  Stability- negative apprehension sign and a negative relocation test, negative anterior load and shift, negative posterior load and shift testing, negative Jess test and a negative Jerk test.  Labrum- positive Drew test, negative sulcus sign      UE NV  Exam: +2 Radial pulses bilaterally. Fingers are warm and well-perfused.  Sensation intact to light touch C5-T1 bilaterally, Radial/median/ulnar nerve motor intact      Shoulder Imaging    Radiographs of the right shoulder and elbow were obtained on 2/20/2025 and reviewed with the patient.  Based on my independent evaluation, the imaging shows no evidence of fracture, dislocation, or significant generative disease.    MRI  arthrogram of the right shoulder from 3/31/2025 demonstrates mild supraspinatus tendinosis with partial thickness (50%) articular sided critical zone tear measuring 0.9 x 0.8 cm.  No muscle atrophy.  Glenoid labrum is intact.  I have reviewed the radiology report and agree with their impression.        Scribe Attestation      I,:  Gabby Esquivel am acting as a scribe while in the presence of the attending physician.:       I,:  Oj Delarosa MD personally performed the services described in this documentation    as scribed in my presence.:

## 2025-04-21 DIAGNOSIS — F51.01 PRIMARY INSOMNIA: ICD-10-CM

## 2025-04-21 RX ORDER — TRAZODONE HYDROCHLORIDE 100 MG/1
TABLET ORAL
Qty: 135 TABLET | Refills: 0 | Status: SHIPPED | OUTPATIENT
Start: 2025-04-21

## 2025-04-26 DIAGNOSIS — E29.1 TESTICULAR HYPOGONADISM: ICD-10-CM

## 2025-04-26 DIAGNOSIS — M75.101 TEAR OF RIGHT SUPRASPINATUS TENDON: ICD-10-CM

## 2025-04-28 ENCOUNTER — TELEPHONE (OUTPATIENT)
Dept: ENDOCRINOLOGY | Facility: CLINIC | Age: 45
End: 2025-04-28

## 2025-04-28 RX ORDER — TESTOSTERONE 20.25 MG/1.25G
GEL TOPICAL
Qty: 75 G | Refills: 0 | Status: SHIPPED | OUTPATIENT
Start: 2025-04-28

## 2025-04-28 RX ORDER — MELOXICAM 15 MG/1
15 TABLET ORAL DAILY
Qty: 30 TABLET | Refills: 1 | Status: SHIPPED | OUTPATIENT
Start: 2025-04-28

## 2025-04-28 NOTE — TELEPHONE ENCOUNTER
Reason for call:   [x] Prior Auth  [] Other:     Caller:  [x] Patient  [] Pharmacy [id number] - [place] - [address] [phone]     Medication: Testosterone 1.62% gel    Dose/Frequency: apply 2 pump am    Quantity: 75g    Ordering Provider:   [] PCP/Provider -   [x] Speciality/Provider - jazz/Vaishali    Has the patient tried other medications and failed? If failed, which medications did they fail?    [] No   [x] Yes - patient has been on this med for years    Is the patient's insurance updated in EPIC?   [x] Yes   [] No     Is a copy of the patient's insurance scanned in EPIC?   [x] Yes   [] No

## 2025-04-28 NOTE — TELEPHONE ENCOUNTER
PA for Testosterone 1.62 % GEL  NOT REQUIRED     Reason (screenshot if applicable)          Patient advised by          [x] MyChart Message  [] Phone call   []LMOM  []L/M to call office as no active Communication consent on file  []Unable to leave detailed message as VM not approved on Communication consent       Pharmacy advised by    [x]Fax  []Phone call

## 2025-04-28 NOTE — TELEPHONE ENCOUNTER
Medication:  PDMP 03/06/2025 03/06/2025 Testosterone (Gel/jelly) 75.0 30 1.62% NA [facility] Commercial Insurance 0 / 0 PA   1 [id number] 01/21/2025 01/21/2025 Testosterone (Gel/jelly) 75.0 30 1.62% NA [facility] Commercial Insurance 0 / 0 PA   1 [id number] 12/01/2024 11/01/2024 Testosterone (Gel/jelly) 75.0 30 1.62% NA [facility] Commercial Insurance 0 / 0 PA   1 [id number] 10/22/2024 09/09/2024 Testosterone (Gel/jelly) 75.0 30 1.62% BRYAN [facility] Commercial Insurance 0 / 0 PA   1 [id number] 09/24/2024 09/24/2024 Testosterone (Gel/jelly) 75.0 30 1.62% BRYAN [facility] Commercial Insurance 0 / 0 PA

## 2025-05-24 DIAGNOSIS — E78.2 MIXED HYPERLIPIDEMIA: ICD-10-CM

## 2025-05-27 RX ORDER — OMEGA-3-ACID ETHYL ESTERS 1 G/1
2 CAPSULE, LIQUID FILLED ORAL 2 TIMES DAILY
Qty: 360 CAPSULE | Refills: 1 | Status: SHIPPED | OUTPATIENT
Start: 2025-05-27

## 2025-06-06 DIAGNOSIS — E29.1 TESTICULAR HYPOGONADISM: ICD-10-CM

## 2025-06-06 RX ORDER — TESTOSTERONE 20.25 MG/1.25G
GEL TOPICAL
Qty: 75 G | Refills: 0 | Status: SHIPPED | OUTPATIENT
Start: 2025-06-06

## 2025-06-09 DIAGNOSIS — F51.01 PRIMARY INSOMNIA: ICD-10-CM

## 2025-06-09 RX ORDER — TRAZODONE HYDROCHLORIDE 100 MG/1
TABLET ORAL
Qty: 135 TABLET | Refills: 3 | Status: SHIPPED | OUTPATIENT
Start: 2025-06-09

## 2025-07-16 DIAGNOSIS — E29.1 TESTICULAR HYPOGONADISM: ICD-10-CM

## 2025-07-16 RX ORDER — TESTOSTERONE 1.62 MG/G
GEL TRANSDERMAL
Qty: 75 G | Refills: 0 | OUTPATIENT
Start: 2025-07-16

## 2025-07-16 RX ORDER — TESTOSTERONE 20.25 MG/1.25G
GEL TOPICAL
Qty: 75 G | Refills: 0 | Status: SHIPPED | OUTPATIENT
Start: 2025-07-16

## 2025-08-19 DIAGNOSIS — E29.1 TESTICULAR HYPOGONADISM: ICD-10-CM

## 2025-08-20 RX ORDER — TESTOSTERONE 1.62 MG/G
GEL TRANSDERMAL
Qty: 75 G | Refills: 0 | Status: SHIPPED | OUTPATIENT
Start: 2025-08-20